# Patient Record
Sex: FEMALE | Race: WHITE | NOT HISPANIC OR LATINO | Employment: FULL TIME | URBAN - METROPOLITAN AREA
[De-identification: names, ages, dates, MRNs, and addresses within clinical notes are randomized per-mention and may not be internally consistent; named-entity substitution may affect disease eponyms.]

---

## 2017-02-15 DIAGNOSIS — K21.9 GASTROESOPHAGEAL REFLUX DISEASE WITHOUT ESOPHAGITIS: ICD-10-CM

## 2017-02-15 RX ORDER — PANTOPRAZOLE SODIUM 20 MG/1
20 TABLET, DELAYED RELEASE ORAL DAILY
Qty: 30 TABLET | Refills: 0 | Status: SHIPPED | OUTPATIENT
Start: 2017-02-15 | End: 2017-03-08 | Stop reason: SDUPTHER

## 2017-03-08 ENCOUNTER — OFFICE VISIT (OUTPATIENT)
Dept: FAMILY MEDICINE CLINIC | Facility: CLINIC | Age: 37
End: 2017-03-08

## 2017-03-08 VITALS
RESPIRATION RATE: 16 BRPM | SYSTOLIC BLOOD PRESSURE: 102 MMHG | HEIGHT: 66 IN | HEART RATE: 84 BPM | BODY MASS INDEX: 28.28 KG/M2 | TEMPERATURE: 98.1 F | DIASTOLIC BLOOD PRESSURE: 66 MMHG | WEIGHT: 176 LBS | OXYGEN SATURATION: 98 %

## 2017-03-08 DIAGNOSIS — F98.8 ADD (ATTENTION DEFICIT DISORDER) WITHOUT HYPERACTIVITY: ICD-10-CM

## 2017-03-08 DIAGNOSIS — K21.9 GASTROESOPHAGEAL REFLUX DISEASE WITHOUT ESOPHAGITIS: ICD-10-CM

## 2017-03-08 DIAGNOSIS — F41.9 ANXIETY: ICD-10-CM

## 2017-03-08 DIAGNOSIS — F51.01 PRIMARY INSOMNIA: Primary | ICD-10-CM

## 2017-03-08 PROCEDURE — 99213 OFFICE O/P EST LOW 20 MIN: CPT | Performed by: FAMILY MEDICINE

## 2017-03-08 RX ORDER — FLUTICASONE PROPIONATE 50 MCG
SPRAY, SUSPENSION (ML) NASAL
Refills: 0 | COMMUNITY
Start: 2017-02-07 | End: 2021-03-29 | Stop reason: SDUPTHER

## 2017-03-08 RX ORDER — PANTOPRAZOLE SODIUM 20 MG/1
20 TABLET, DELAYED RELEASE ORAL DAILY
Qty: 30 TABLET | Refills: 0 | Status: SHIPPED | OUTPATIENT
Start: 2017-03-08 | End: 2017-04-13 | Stop reason: SDUPTHER

## 2017-03-08 RX ORDER — ALPRAZOLAM 2 MG/1
2 TABLET ORAL 2 TIMES DAILY PRN
Qty: 60 TABLET | Refills: 5 | Status: SHIPPED | OUTPATIENT
Start: 2017-03-08 | End: 2017-08-07 | Stop reason: SDUPTHER

## 2017-03-08 RX ORDER — ZOLPIDEM TARTRATE 10 MG/1
10 TABLET ORAL NIGHTLY PRN
Qty: 30 TABLET | Refills: 5 | Status: SHIPPED | OUTPATIENT
Start: 2017-03-08 | End: 2017-08-07 | Stop reason: SDUPTHER

## 2017-03-08 RX ORDER — ETHYNODIOL DIACETATE AND ETHINYL ESTRADIOL 1 MG-35MCG
KIT ORAL
Refills: 11 | COMMUNITY
Start: 2017-02-25 | End: 2020-01-16

## 2017-03-08 NOTE — PATIENT INSTRUCTIONS
This is a very nice 37-year-old who is here for follow-up.  She has always had a focus problem and so I will make a request for consultation for testing for ADD.

## 2017-03-08 NOTE — PROGRESS NOTES
Subjective   Dianna Clay is a 37 y.o. female presenting with   Chief Complaint   Patient presents with   • Medciation Check Up   • Med Refill     all meds (except her birth control pills)  send to [facility]     • Testing     for ADD   • Earache     right ear pain  x 1-2 wks    • Labs Only     drug screen         HPI Comments: 37-year-old  white female nonsmoker comes in today for routine follow-up for insomnia and anxiety.  She tells me she has always had problems with focus and her boss has recently asked her if she's ever been tested for ADD, since her boss's son has it.  We have talked about this in the past a couple years ago and a year before that her therapist suggested she get tested as well.  However she has always had coping mechanisms that allowed her to function without medication.  However now that her boss is bringing is up, she is ready to get tested.    He also mentions that her right earlobe is a little sensitive and she wanted me to look at that.  However she thinks she might be a little too rough when she puts her ear rings in her pierced ear    Earache           The following portions of the patient's history were reviewed and updated as appropriate: current medications, past family history, past medical history, past social history, past surgical history and problem list.    Review of Systems   HENT: Positive for ear pain.    Psychiatric/Behavioral: Positive for decreased concentration and sleep disturbance. The patient is nervous/anxious.    All other systems reviewed and are negative.      Objective   Physical Exam   Constitutional: She is oriented to person, place, and time. She appears well-developed and well-nourished. No distress.   HENT:   Head: Normocephalic and atraumatic.   Right Ear: External ear normal.   Left Ear: External ear normal.   Mouth/Throat: Oropharynx is clear and moist. No oropharyngeal exudate.   Eyes: EOM are normal. Pupils are equal, round, and reactive to  light.   Neck: Normal range of motion. Neck supple. No thyromegaly present.   Cardiovascular: Normal rate and regular rhythm.    Pulmonary/Chest: Effort normal and breath sounds normal.   Musculoskeletal: Normal range of motion. She exhibits no edema or tenderness.   Lymphadenopathy:     She has no cervical adenopathy.   Neurological: She is alert and oriented to person, place, and time. No cranial nerve deficit. Coordination normal.   Skin: Skin is warm. She is not diaphoretic.   Psychiatric: She has a normal mood and affect. Her speech is normal and behavior is normal. Judgment and thought content normal. She is not actively hallucinating. Cognition and memory are normal. She is attentive.   Nursing note and vitals reviewed.      Assessment/Plan   Dianna was seen today for medciation check up, med refill, testing, earache and labs only.    Diagnoses and all orders for this visit:    Primary insomnia  -     zolpidem (AMBIEN) 10 MG tablet; Take 1 tablet by mouth At Night As Needed for sleep.  -     201438 9+Oxycodone+Crt-Unbund    Anxiety  -     ALPRAZolam (XANAX) 2 MG tablet; Take 1 tablet by mouth 2 (Two) Times a Day As Needed for Anxiety.  -     148208 9+Oxycodone+Crt-Unbund    Gastroesophageal reflux disease without esophagitis  -     pantoprazole (PROTONIX) 20 MG EC tablet; Take 1 tablet by mouth Daily.    ADD (attention deficit disorder) without hyperactivity  -     Ambulatory Referral to Psychology    Other orders  -     topiramate (TOPAMAX) 200 MG tablet; Take 1 tablet by mouth Daily.                   I would like him to return for another visit in 6 month(s)

## 2017-03-22 LAB
ALPRAZ UR QL: NEGATIVE
AMPHETAMINES UR QL SCN: NEGATIVE NG/ML
BARBITURATES UR QL SCN: NEGATIVE NG/ML
BENZODIAZ UR QL SCN: NORMAL NG/ML
BENZODIAZ UR QL: NEGATIVE NG/ML
BZE UR QL SCN: NEGATIVE NG/ML
CANNABINOIDS UR QL SCN: NEGATIVE NG/ML
CLONAZEPAM UR QL: NEGATIVE
CREAT UR-MCNC: 33 MG/DL (ref 20–300)
FLURAZEPAM UR QL: NEGATIVE
LORAZEPAM UR QL: NEGATIVE
Lab: NORMAL
METHADONE UR QL SCN: NEGATIVE NG/ML
MIDAZOLAM UR QL CFM: NEGATIVE
NORDIAZEPAM UR QL: NEGATIVE
OPIATES UR QL SCN: NEGATIVE NG/ML
OXAZEPAM UR QL: NEGATIVE
OXYCODONE+OXYMORPHONE UR QL SCN: NEGATIVE NG/ML
PCP UR QL: NEGATIVE NG/ML
PH UR: 6 [PH] (ref 4.5–8.9)
PROPOXYPH UR QL SCN: NEGATIVE NG/ML
TEMAZEPAM UR QL CFM: NEGATIVE
TRIAZOLAM UR QL: NEGATIVE

## 2017-04-13 DIAGNOSIS — K21.9 GASTROESOPHAGEAL REFLUX DISEASE WITHOUT ESOPHAGITIS: ICD-10-CM

## 2017-04-14 RX ORDER — PANTOPRAZOLE SODIUM 20 MG/1
TABLET, DELAYED RELEASE ORAL
Qty: 30 TABLET | Refills: 0 | Status: SHIPPED | OUTPATIENT
Start: 2017-04-14 | End: 2017-06-13 | Stop reason: SDUPTHER

## 2017-06-13 DIAGNOSIS — K21.9 GASTROESOPHAGEAL REFLUX DISEASE WITHOUT ESOPHAGITIS: ICD-10-CM

## 2017-06-14 RX ORDER — PANTOPRAZOLE SODIUM 20 MG/1
TABLET, DELAYED RELEASE ORAL
Qty: 30 TABLET | Refills: 2 | Status: SHIPPED | OUTPATIENT
Start: 2017-06-14 | End: 2017-08-25 | Stop reason: SDUPTHER

## 2017-08-07 DIAGNOSIS — F51.01 PRIMARY INSOMNIA: ICD-10-CM

## 2017-08-07 DIAGNOSIS — F41.9 ANXIETY: ICD-10-CM

## 2017-08-07 RX ORDER — ALPRAZOLAM 2 MG/1
TABLET ORAL
Qty: 60 TABLET | Refills: 0 | OUTPATIENT
Start: 2017-08-07 | End: 2017-10-25 | Stop reason: SDUPTHER

## 2017-08-07 RX ORDER — ZOLPIDEM TARTRATE 10 MG/1
TABLET ORAL
Qty: 30 TABLET | Refills: 0 | OUTPATIENT
Start: 2017-08-07 | End: 2017-10-25 | Stop reason: SDUPTHER

## 2017-08-25 DIAGNOSIS — K21.9 GASTROESOPHAGEAL REFLUX DISEASE WITHOUT ESOPHAGITIS: ICD-10-CM

## 2017-08-25 RX ORDER — PANTOPRAZOLE SODIUM 20 MG/1
TABLET, DELAYED RELEASE ORAL
Qty: 30 TABLET | Refills: 1 | Status: SHIPPED | OUTPATIENT
Start: 2017-08-25 | End: 2017-11-27 | Stop reason: SDUPTHER

## 2017-10-25 ENCOUNTER — TELEPHONE (OUTPATIENT)
Dept: FAMILY MEDICINE CLINIC | Facility: CLINIC | Age: 37
End: 2017-10-25

## 2017-10-25 ENCOUNTER — OFFICE VISIT (OUTPATIENT)
Dept: FAMILY MEDICINE CLINIC | Facility: CLINIC | Age: 37
End: 2017-10-25

## 2017-10-25 VITALS
SYSTOLIC BLOOD PRESSURE: 100 MMHG | OXYGEN SATURATION: 95 % | BODY MASS INDEX: 27.32 KG/M2 | WEIGHT: 170 LBS | TEMPERATURE: 98.3 F | HEIGHT: 66 IN | HEART RATE: 90 BPM | DIASTOLIC BLOOD PRESSURE: 70 MMHG

## 2017-10-25 DIAGNOSIS — F41.9 ANXIETY: ICD-10-CM

## 2017-10-25 DIAGNOSIS — G43.009 MIGRAINE WITHOUT AURA AND WITHOUT STATUS MIGRAINOSUS, NOT INTRACTABLE: ICD-10-CM

## 2017-10-25 DIAGNOSIS — F41.8 MIXED ANXIETY DEPRESSIVE DISORDER: Primary | ICD-10-CM

## 2017-10-25 DIAGNOSIS — F51.01 PRIMARY INSOMNIA: ICD-10-CM

## 2017-10-25 PROCEDURE — 99213 OFFICE O/P EST LOW 20 MIN: CPT | Performed by: FAMILY MEDICINE

## 2017-10-25 RX ORDER — BUPROPION HYDROCHLORIDE 150 MG/1
150 TABLET ORAL DAILY
Qty: 30 TABLET | Refills: 5 | Status: SHIPPED | OUTPATIENT
Start: 2017-10-25 | End: 2018-03-28 | Stop reason: SDUPTHER

## 2017-10-25 RX ORDER — ZOLPIDEM TARTRATE 10 MG/1
10 TABLET ORAL NIGHTLY PRN
Qty: 30 TABLET | Refills: 5 | OUTPATIENT
Start: 2017-10-25 | End: 2018-03-28 | Stop reason: SDUPTHER

## 2017-10-25 RX ORDER — ALPRAZOLAM 2 MG/1
2 TABLET ORAL 2 TIMES DAILY PRN
Qty: 60 TABLET | Refills: 5 | OUTPATIENT
Start: 2017-10-25 | End: 2018-03-28 | Stop reason: SDUPTHER

## 2017-10-25 NOTE — PATIENT INSTRUCTIONS
This is a very nice 37-year-old who is here for follow-up for migraine headaches and anxiety.  I will will try Wellbutrin and see if that helps.  I would like her to call if there is any problem.

## 2017-10-25 NOTE — PROGRESS NOTES
Subjective   Dianna Clay is a 37 y.o. female presenting with   Chief Complaint   Patient presents with   • Anxiety        HPI Comments: This is a 37-year-old  white female nonsmoker who is  to a 45-year-old gentleman and has a 15-year-old and 11-year-old and 10-year-old at home.  She just started a job 2 months ago which she says she really likes but is very high stress.  She says she is seeing a counselor at least once monthly who suggested to her that she has anxiety and OCD and really does need to be on some kind of medication.  She tells me since she was 17 years old she has always been on something and had good results with Wellbutrin and fair results with Prozac but did feel it did inhibit her sexually.  She does not want to be on anything that is addictive.    She also says that she is on 200 mg Topamax daily but occasionally feels like she is about to have another migraine.  She asks if we should up the dose of her Topamax, but I told her that 200 mg was about the highest I was comfortable having her own without consultation with a neurologist.  She said she would just continue the 200 and see if reducing her anxiety level with the Wellbutrin would help.    Anxiety   Symptoms include nervous/anxious behavior.            The following portions of the patient's history were reviewed and updated as appropriate: current medications, past family history, past medical history, past social history, past surgical history and problem list.    Review of Systems   Neurological: Positive for headaches.   Psychiatric/Behavioral: The patient is nervous/anxious.    All other systems reviewed and are negative.      Objective   Physical Exam   Constitutional: She is oriented to person, place, and time. She appears well-developed and well-nourished. No distress.   HENT:   Head: Normocephalic and atraumatic.   Eyes: EOM are normal. Pupils are equal, round, and reactive to light.   Neck: Normal range of  motion. Neck supple. No thyromegaly present.   Cardiovascular: Normal rate, regular rhythm, normal heart sounds and intact distal pulses.  Exam reveals no friction rub.    No murmur heard.  Pulmonary/Chest: Effort normal and breath sounds normal.   Musculoskeletal: Normal range of motion. She exhibits no edema.   Lymphadenopathy:     She has no cervical adenopathy.   Neurological: She is alert and oriented to person, place, and time.   Skin: Skin is warm and dry. She is not diaphoretic.   Psychiatric: She has a normal mood and affect. Her speech is normal and behavior is normal. Judgment and thought content normal. She is not actively hallucinating. Cognition and memory are normal.   Well-dressed and good eye contact.  Linear thought 10 normal recent and remote memory.  No sign of any suicidal or homicidal ideation or of any delusions. She is attentive.   Nursing note and vitals reviewed.      Assessment/Plan   Dianna was seen today for anxiety.    Diagnoses and all orders for this visit:    Mixed anxiety depressive disorder    Anxiety    Migraine without aura and without status migrainosus, not intractable    Other orders  -     buPROPion XL (WELLBUTRIN XL) 150 MG 24 hr tablet; Take 1 tablet by mouth Daily.                   I would like him to return for another visit in 6 month(s)

## 2017-11-27 DIAGNOSIS — K21.9 GASTROESOPHAGEAL REFLUX DISEASE WITHOUT ESOPHAGITIS: ICD-10-CM

## 2017-11-27 RX ORDER — PANTOPRAZOLE SODIUM 20 MG/1
TABLET, DELAYED RELEASE ORAL
Qty: 30 TABLET | Refills: 2 | Status: SHIPPED | OUTPATIENT
Start: 2017-11-27 | End: 2018-02-28 | Stop reason: SDUPTHER

## 2018-02-28 DIAGNOSIS — K21.9 GASTROESOPHAGEAL REFLUX DISEASE WITHOUT ESOPHAGITIS: ICD-10-CM

## 2018-02-28 RX ORDER — PANTOPRAZOLE SODIUM 20 MG/1
TABLET, DELAYED RELEASE ORAL
Qty: 30 TABLET | Refills: 2 | Status: SHIPPED | OUTPATIENT
Start: 2018-02-28 | End: 2018-06-18 | Stop reason: SDUPTHER

## 2018-03-06 ENCOUNTER — OFFICE VISIT (OUTPATIENT)
Dept: FAMILY MEDICINE CLINIC | Facility: CLINIC | Age: 38
End: 2018-03-06

## 2018-03-06 VITALS
BODY MASS INDEX: 26.97 KG/M2 | HEART RATE: 78 BPM | OXYGEN SATURATION: 98 % | DIASTOLIC BLOOD PRESSURE: 60 MMHG | SYSTOLIC BLOOD PRESSURE: 105 MMHG | HEIGHT: 66 IN | TEMPERATURE: 98.4 F | WEIGHT: 167.8 LBS

## 2018-03-06 DIAGNOSIS — J01.00 ACUTE NON-RECURRENT MAXILLARY SINUSITIS: Primary | ICD-10-CM

## 2018-03-06 DIAGNOSIS — J02.9 SORE THROAT: ICD-10-CM

## 2018-03-06 LAB
EXPIRATION DATE: NORMAL
INTERNAL CONTROL: NORMAL
Lab: NORMAL
S PYO AG THROAT QL: NEGATIVE

## 2018-03-06 PROCEDURE — 87880 STREP A ASSAY W/OPTIC: CPT | Performed by: NURSE PRACTITIONER

## 2018-03-06 PROCEDURE — 99213 OFFICE O/P EST LOW 20 MIN: CPT | Performed by: NURSE PRACTITIONER

## 2018-03-06 RX ORDER — AMOXICILLIN AND CLAVULANATE POTASSIUM 875; 125 MG/1; MG/1
1 TABLET, FILM COATED ORAL EVERY 12 HOURS SCHEDULED
Qty: 20 TABLET | Refills: 0 | Status: SHIPPED | OUTPATIENT
Start: 2018-03-06 | End: 2018-04-30

## 2018-03-06 RX ORDER — FLUCONAZOLE 150 MG/1
TABLET ORAL
Qty: 3 TABLET | Refills: 0 | Status: SHIPPED | OUTPATIENT
Start: 2018-03-06 | End: 2019-05-24

## 2018-03-06 NOTE — PROGRESS NOTES
Subjective   Dianna Clay is a 38 y.o. female increased ear popping, swollen lymph nodes, sore throat, painful to swallow, increased post nasal drainage. Denies sinus pain or pressure. Possible fever 2 nights ago.    Sore Throat    This is a new problem. The current episode started in the past 7 days. The problem has been unchanged. Maximum temperature: low grade. The pain is moderate. Associated symptoms include congestion, coughing (mild, dry ), a plugged ear sensation and swollen glands. Pertinent negatives include no abdominal pain, diarrhea, drooling, ear discharge, ear pain (ear popping), headaches, hoarse voice, neck pain, shortness of breath, stridor, trouble swallowing or vomiting. She has had no exposure to strep or mono. She has tried NSAIDs for the symptoms. The treatment provided mild relief.   URI    This is a new problem. The current episode started in the past 7 days. The problem has been unchanged. There has been no fever. Associated symptoms include congestion, coughing (mild, dry ), a plugged ear sensation, rhinorrhea, a sore throat and swollen glands. Pertinent negatives include no abdominal pain, chest pain, diarrhea, dysuria, ear pain (ear popping), headaches, joint pain, joint swelling, nausea, neck pain, rash, sinus pain, sneezing, vomiting or wheezing. She has tried NSAIDs for the symptoms. The treatment provided no relief.        The following portions of the patient's history were reviewed and updated as appropriate: allergies, current medications, past family history, past medical history, past social history, past surgical history and problem list.    Review of Systems   Constitutional: Positive for fatigue.   HENT: Positive for congestion, postnasal drip, rhinorrhea and sore throat. Negative for drooling, ear discharge, ear pain (ear popping), hoarse voice, sinus pain, sinus pressure, sneezing and trouble swallowing.    Respiratory: Positive for cough (mild, dry ). Negative for  shortness of breath, wheezing and stridor.    Cardiovascular: Negative for chest pain.   Gastrointestinal: Negative for abdominal pain, diarrhea, nausea and vomiting.   Genitourinary: Negative for dysuria.   Musculoskeletal: Negative for joint pain and neck pain.   Skin: Negative for rash.   Neurological: Negative for headaches.       Objective   Physical Exam   Constitutional: She is oriented to person, place, and time. She appears well-developed and well-nourished.   HENT:   Head: Normocephalic and atraumatic.   Right Ear: External ear and ear canal normal. A middle ear effusion is present.   Left Ear: External ear and ear canal normal. A middle ear effusion is present.   Nose: Mucosal edema present. No rhinorrhea. Right sinus exhibits maxillary sinus tenderness. Right sinus exhibits no frontal sinus tenderness. Left sinus exhibits maxillary sinus tenderness. Left sinus exhibits no frontal sinus tenderness.   Mouth/Throat: Uvula is midline, oropharynx is clear and moist and mucous membranes are normal. No tonsillar exudate.   Eyes: Pupils are equal, round, and reactive to light.   Neck: Neck supple.   Cardiovascular: Normal rate, regular rhythm and normal heart sounds.  Exam reveals no gallop and no friction rub.    No murmur heard.  Pulmonary/Chest: Effort normal and breath sounds normal. No respiratory distress. She has no wheezes. She has no rales.   Lymphadenopathy:     She has cervical adenopathy.   Neurological: She is alert and oriented to person, place, and time.   Skin: Skin is warm and dry.   Psychiatric: She has a normal mood and affect.   Vitals reviewed.      Assessment/Plan   Dianna was seen today for sore throat.    Diagnoses and all orders for this visit:    Sore throat  -     POCT rapid strep A    Other orders  -     amoxicillin-clavulanate (AUGMENTIN) 875-125 MG per tablet; Take 1 tablet by mouth Every 12 (Twelve) Hours.  -     fluconazole (DIFLUCAN) 150 MG tablet; Take 1 at onset of symptoms,  repeat in 3 days x 2 as needed

## 2018-03-28 DIAGNOSIS — F51.01 PRIMARY INSOMNIA: ICD-10-CM

## 2018-03-28 DIAGNOSIS — F41.9 ANXIETY: ICD-10-CM

## 2018-03-28 RX ORDER — ALPRAZOLAM 2 MG/1
TABLET ORAL
Qty: 60 TABLET | Refills: 1 | OUTPATIENT
Start: 2018-03-28 | End: 2018-04-30 | Stop reason: SINTOL

## 2018-03-28 RX ORDER — ZOLPIDEM TARTRATE 10 MG/1
TABLET ORAL
Qty: 30 TABLET | Refills: 1 | OUTPATIENT
Start: 2018-03-28 | End: 2018-07-19 | Stop reason: SDUPTHER

## 2018-03-28 RX ORDER — BUPROPION HYDROCHLORIDE 150 MG/1
TABLET ORAL
Qty: 30 TABLET | Refills: 1 | Status: SHIPPED | OUTPATIENT
Start: 2018-03-28 | End: 2018-06-18 | Stop reason: SDUPTHER

## 2018-04-27 ENCOUNTER — TELEPHONE (OUTPATIENT)
Dept: FAMILY MEDICINE CLINIC | Facility: CLINIC | Age: 38
End: 2018-04-27

## 2018-04-27 ENCOUNTER — CLINICAL SUPPORT (OUTPATIENT)
Dept: FAMILY MEDICINE CLINIC | Facility: CLINIC | Age: 38
End: 2018-04-27

## 2018-04-27 DIAGNOSIS — Z23 IMMUNIZATION DUE: Primary | ICD-10-CM

## 2018-04-27 PROCEDURE — 90632 HEPA VACCINE ADULT IM: CPT | Performed by: FAMILY MEDICINE

## 2018-04-27 PROCEDURE — 90471 IMMUNIZATION ADMIN: CPT | Performed by: FAMILY MEDICINE

## 2018-04-27 NOTE — TELEPHONE ENCOUNTER
Pt had questions about her welbutrin and it's side effects, was wondering if she should still feel really emotional after being on it for 6 mo.    Pt needs refills on xanax and ambien      Pt did not cancel appt someone up front did. She came in at 4pm and you had just left.

## 2018-04-30 DIAGNOSIS — F41.8 ANXIETY ASSOCIATED WITH DEPRESSION: Primary | ICD-10-CM

## 2018-04-30 RX ORDER — HYDROXYZINE HYDROCHLORIDE 25 MG/1
25 TABLET, FILM COATED ORAL 3 TIMES DAILY PRN
Qty: 30 TABLET | Refills: 2 | Status: SHIPPED | OUTPATIENT
Start: 2018-04-30 | End: 2018-11-29

## 2018-04-30 NOTE — TELEPHONE ENCOUNTER
Called pt and told her about her choices and she said she would give hydroxyzine a try and if you could call in a script

## 2018-04-30 NOTE — TELEPHONE ENCOUNTER
Called pt back and she was wondering if you were taking her off of xanax all together and not weaning her off with a smaller dose? Also she was asking if she would be hearing from a psychiatric office to set up an appt?

## 2018-04-30 NOTE — TELEPHONE ENCOUNTER
Our group of doctors has determined that xanax is a high risk medication for long term use.  Since she continues to have a lot of symptoms despite the welbutrin, I will request a psychiatry consult to see what is the best next step.

## 2018-04-30 NOTE — TELEPHONE ENCOUNTER
She should be hearing from [facility] about the referral, and she is on a low enough dose that she shouldn't have to be weaned.

## 2018-06-18 DIAGNOSIS — K21.9 GASTROESOPHAGEAL REFLUX DISEASE WITHOUT ESOPHAGITIS: ICD-10-CM

## 2018-06-18 DIAGNOSIS — F51.01 PRIMARY INSOMNIA: ICD-10-CM

## 2018-06-18 RX ORDER — BUPROPION HYDROCHLORIDE 150 MG/1
TABLET ORAL
Qty: 30 TABLET | Status: CANCELLED | OUTPATIENT
Start: 2018-06-18

## 2018-06-18 RX ORDER — PANTOPRAZOLE SODIUM 20 MG/1
20 TABLET, DELAYED RELEASE ORAL DAILY
Qty: 30 TABLET | Refills: 0 | Status: SHIPPED | OUTPATIENT
Start: 2018-06-18 | End: 2018-12-18 | Stop reason: SDUPTHER

## 2018-06-18 RX ORDER — BUPROPION HYDROCHLORIDE 150 MG/1
150 TABLET ORAL DAILY
Qty: 30 TABLET | Refills: 0 | Status: SHIPPED | OUTPATIENT
Start: 2018-06-18 | End: 2018-07-20 | Stop reason: SDUPTHER

## 2018-06-18 RX ORDER — ALPRAZOLAM 2 MG/1
TABLET ORAL
Qty: 60 TABLET | OUTPATIENT
Start: 2018-06-18

## 2018-06-18 RX ORDER — ZOLPIDEM TARTRATE 10 MG/1
TABLET ORAL
Qty: 30 TABLET | OUTPATIENT
Start: 2018-06-18

## 2018-06-18 RX ORDER — PANTOPRAZOLE SODIUM 20 MG/1
TABLET, DELAYED RELEASE ORAL
Qty: 30 TABLET | Status: CANCELLED | OUTPATIENT
Start: 2018-06-18

## 2018-06-18 NOTE — TELEPHONE ENCOUNTER
Pt said we canceled her appt on her 4/27/18 and she has been busy and can't get in to see the doctor and wants a refill in these meds. Also said she is leaving today at 3pm.

## 2018-07-19 DIAGNOSIS — F51.01 PRIMARY INSOMNIA: ICD-10-CM

## 2018-07-20 RX ORDER — ALPRAZOLAM 2 MG/1
TABLET ORAL
Qty: 60 TABLET | Refills: 0 | OUTPATIENT
Start: 2018-07-20 | End: 2018-09-08 | Stop reason: SDUPTHER

## 2018-07-20 RX ORDER — BUPROPION HYDROCHLORIDE 150 MG/1
TABLET ORAL
Qty: 30 TABLET | Refills: 2 | Status: SHIPPED | OUTPATIENT
Start: 2018-07-20 | End: 2018-11-29 | Stop reason: SDUPTHER

## 2018-07-20 RX ORDER — ZOLPIDEM TARTRATE 10 MG/1
TABLET ORAL
Qty: 30 TABLET | Refills: 0 | OUTPATIENT
Start: 2018-07-20 | End: 2018-09-08 | Stop reason: SDUPTHER

## 2018-09-08 DIAGNOSIS — F51.01 PRIMARY INSOMNIA: ICD-10-CM

## 2018-09-10 RX ORDER — ZOLPIDEM TARTRATE 10 MG/1
TABLET ORAL
Qty: 30 TABLET | Refills: 0 | OUTPATIENT
Start: 2018-09-10 | End: 2018-11-29 | Stop reason: SDUPTHER

## 2018-09-10 RX ORDER — ALPRAZOLAM 2 MG/1
TABLET ORAL
Qty: 60 TABLET | Refills: 0 | OUTPATIENT
Start: 2018-09-10 | End: 2018-11-29 | Stop reason: SDUPTHER

## 2018-11-29 ENCOUNTER — OFFICE VISIT (OUTPATIENT)
Dept: FAMILY MEDICINE CLINIC | Facility: CLINIC | Age: 38
End: 2018-11-29

## 2018-11-29 VITALS
TEMPERATURE: 98.1 F | DIASTOLIC BLOOD PRESSURE: 70 MMHG | HEART RATE: 96 BPM | WEIGHT: 183 LBS | OXYGEN SATURATION: 99 % | SYSTOLIC BLOOD PRESSURE: 98 MMHG | BODY MASS INDEX: 29.55 KG/M2

## 2018-11-29 DIAGNOSIS — F51.01 PRIMARY INSOMNIA: Primary | ICD-10-CM

## 2018-11-29 DIAGNOSIS — Z13.29 SCREENING FOR THYROID DISORDER: ICD-10-CM

## 2018-11-29 DIAGNOSIS — F33.1 MODERATE EPISODE OF RECURRENT MAJOR DEPRESSIVE DISORDER (HCC): ICD-10-CM

## 2018-11-29 DIAGNOSIS — R53.83 FATIGUE, UNSPECIFIED TYPE: ICD-10-CM

## 2018-11-29 PROCEDURE — 99214 OFFICE O/P EST MOD 30 MIN: CPT | Performed by: NURSE PRACTITIONER

## 2018-11-29 RX ORDER — ALPRAZOLAM 2 MG/1
2 TABLET ORAL 2 TIMES DAILY PRN
Qty: 60 TABLET | Refills: 0 | Status: SHIPPED | OUTPATIENT
Start: 2018-11-29 | End: 2019-01-28 | Stop reason: SDUPTHER

## 2018-11-29 RX ORDER — BUPROPION HYDROCHLORIDE 150 MG/1
150 TABLET ORAL DAILY
Qty: 30 TABLET | Refills: 5 | Status: SHIPPED | OUTPATIENT
Start: 2018-11-29 | End: 2019-05-24 | Stop reason: SDUPTHER

## 2018-11-29 RX ORDER — ALPRAZOLAM 2 MG/1
2 TABLET ORAL 2 TIMES DAILY PRN
Qty: 60 TABLET | Refills: 0 | OUTPATIENT
Start: 2018-11-29 | End: 2018-11-29 | Stop reason: SDUPTHER

## 2018-11-29 RX ORDER — ZOLPIDEM TARTRATE 10 MG/1
10 TABLET ORAL NIGHTLY PRN
Qty: 30 TABLET | Refills: 0 | OUTPATIENT
Start: 2018-11-29 | End: 2018-11-29 | Stop reason: SDUPTHER

## 2018-11-29 RX ORDER — ZOLPIDEM TARTRATE 10 MG/1
10 TABLET ORAL NIGHTLY PRN
Qty: 30 TABLET | Refills: 0 | Status: SHIPPED | OUTPATIENT
Start: 2018-11-29 | End: 2019-01-28 | Stop reason: SDUPTHER

## 2018-11-29 NOTE — PROGRESS NOTES
Subjective   Dianna Clay is a 38 y.o. female presents with increased fatigue and insomnia, feeling depressed, not wanting to leave her house. States has not left her house for two weeks and she has been working from home. She usually works out 4-5 x weekly and she has not worked out. She is falling asleep at her computer. Typically she will sleep 7-8 hours but her sleep has been interrupted and she has been out of ambien. She has  recently and bought a new house and sharing custody with her ex-. She has a history of depression and feels that she is going back into that cycle. She takes wellbutrin but doesn't remember to take it everyday. She also reports increased alcohol intake, 1-2 glasses of wine here and there which is unusual for her.     Fatigue   Associated symptoms include fatigue. Pertinent negatives include no abdominal pain, anorexia, arthralgias, change in bowel habit, chest pain, chills, congestion, coughing, diaphoresis, fever, headaches, joint swelling, myalgias, nausea, neck pain, numbness, rash, sore throat, swollen glands, urinary symptoms, vertigo, visual change, vomiting or weakness. Nothing aggravates the symptoms. She has tried nothing for the symptoms. The treatment provided no relief.   Depression   Visit Type: follow-up  Patient presents with the following symptoms: depressed mood, excessive worry, fatigue, feelings of hopelessness, insomnia and malaise.  Patient is not experiencing: suicidal ideas, suicidal planning, thoughts of death, weight gain and weight loss.  Frequency of symptoms: most days   Severity: interfering with daily activities   Sleep quality: fair  Nighttime awakenings: occasional  Compliance with medications:  51-75%             The following portions of the patient's history were reviewed and updated as appropriate: allergies, current medications, past family history, past medical history, past social history, past surgical history and problem  list.    Review of Systems   Constitutional: Positive for fatigue. Negative for chills, diaphoresis, fever, weight gain and weight loss.   HENT: Negative for congestion and sore throat.    Respiratory: Negative for cough.    Cardiovascular: Negative for chest pain.   Gastrointestinal: Negative for abdominal pain, anorexia, change in bowel habit, nausea and vomiting.   Musculoskeletal: Negative for arthralgias, joint swelling, myalgias and neck pain.   Skin: Negative for rash.   Neurological: Negative for vertigo, weakness, numbness and headaches.   Psychiatric/Behavioral: Negative for suicidal ideas. The patient has insomnia.        Objective   Physical Exam   Constitutional: She is oriented to person, place, and time. She appears well-developed and well-nourished.   Eyes: Conjunctivae are normal. Pupils are equal, round, and reactive to light.   Neck: Normal range of motion. Neck supple.   Cardiovascular: Normal rate, regular rhythm and normal heart sounds. Exam reveals no gallop and no friction rub.   No murmur heard.  Pulmonary/Chest: Effort normal and breath sounds normal. No stridor. No respiratory distress. She has no wheezes.   Abdominal: Soft. Bowel sounds are normal. She exhibits no distension. There is no tenderness.   Neurological: She is alert and oriented to person, place, and time.   Skin: Skin is warm and dry.   Psychiatric: She has a normal mood and affect.   Vitals reviewed.      Assessment/Plan   Dianna was seen today for follow-up, depression and med refill.    Diagnoses and all orders for this visit:    Primary insomnia  -     Discontinue: zolpidem (AMBIEN) 10 MG tablet; Take 1 tablet by mouth At Night As Needed for Sleep. for sleep  -     zolpidem (AMBIEN) 10 MG tablet; Take 1 tablet by mouth At Night As Needed for Sleep. for sleep    Screening for thyroid disorder  -     TSH Rfx On Abnormal To Free T4    Fatigue, unspecified type  -     Comprehensive metabolic panel  -     Vitamin D 25  hydroxy    Moderate episode of recurrent major depressive disorder (CMS/HCC)    Other orders  -     buPROPion XL (WELLBUTRIN XL) 150 MG 24 hr tablet; Take 1 tablet by mouth Daily.  -     Discontinue: ALPRAZolam (XANAX) 2 MG tablet; Take 1 tablet by mouth 2 (Two) Times a Day As Needed for Anxiety. for anxiety  -     ALPRAZolam (XANAX) 2 MG tablet; Take 1 tablet by mouth 2 (Two) Times a Day As Needed for Anxiety. for anxiety  -     topiramate (TOPAMAX) 200 MG tablet; Take 1 tablet by mouth Daily.      Will refill xanax and ambien for Dr. Monet until he returns  Discussed taking wellbutrin on daily basis  Continue topamax  Resume physical activity  Believe symptoms are likely situational with multiple life events recently and insomnia  Check labs to rule out underlying concern  Follow up in 3 months

## 2018-11-30 LAB
25(OH)D3+25(OH)D2 SERPL-MCNC: 66 NG/ML (ref 30–100)
ALBUMIN SERPL-MCNC: 4.6 G/DL (ref 3.5–5.2)
ALBUMIN/GLOB SERPL: 1.8 G/DL
ALP SERPL-CCNC: 45 U/L (ref 39–117)
ALT SERPL-CCNC: 22 U/L (ref 1–33)
AST SERPL-CCNC: 19 U/L (ref 1–32)
BILIRUB SERPL-MCNC: 0.4 MG/DL (ref 0.1–1.2)
BUN SERPL-MCNC: 16 MG/DL (ref 6–20)
BUN/CREAT SERPL: 19.5 (ref 7–25)
CALCIUM SERPL-MCNC: 9.2 MG/DL (ref 8.6–10.5)
CHLORIDE SERPL-SCNC: 99 MMOL/L (ref 98–107)
CO2 SERPL-SCNC: 25.4 MMOL/L (ref 22–29)
CREAT SERPL-MCNC: 0.82 MG/DL (ref 0.57–1)
GLOBULIN SER CALC-MCNC: 2.5 GM/DL
GLUCOSE SERPL-MCNC: 85 MG/DL (ref 65–99)
POTASSIUM SERPL-SCNC: 4.5 MMOL/L (ref 3.5–5.2)
PROT SERPL-MCNC: 7.1 G/DL (ref 6–8.5)
SODIUM SERPL-SCNC: 136 MMOL/L (ref 136–145)
TSH SERPL DL<=0.005 MIU/L-ACNC: 1.3 MIU/ML (ref 0.27–4.2)

## 2018-12-18 DIAGNOSIS — K21.9 GASTROESOPHAGEAL REFLUX DISEASE WITHOUT ESOPHAGITIS: ICD-10-CM

## 2018-12-18 RX ORDER — PANTOPRAZOLE SODIUM 20 MG/1
20 TABLET, DELAYED RELEASE ORAL DAILY
Qty: 30 TABLET | Refills: 4 | Status: SHIPPED | OUTPATIENT
Start: 2018-12-18 | End: 2019-05-01 | Stop reason: SDUPTHER

## 2019-01-09 ENCOUNTER — TELEPHONE (OUTPATIENT)
Dept: FAMILY MEDICINE CLINIC | Facility: CLINIC | Age: 39
End: 2019-01-09

## 2019-01-09 NOTE — TELEPHONE ENCOUNTER
This patient seen an [facility] doctor on Monday this week and was diagnosed with strep and given antibiotics. Her work will not accept the doctors note from the [facility] doctor since it isn't her PCP. She is calling to see if we would be willing to write a note for her for work or if she would need to come in and have another appointment with someone in our office. She is still off work as off today, Wednesday Jan. 9th.

## 2019-01-10 ENCOUNTER — OFFICE VISIT (OUTPATIENT)
Dept: FAMILY MEDICINE CLINIC | Facility: CLINIC | Age: 39
End: 2019-01-10

## 2019-01-10 VITALS
TEMPERATURE: 99 F | HEART RATE: 84 BPM | BODY MASS INDEX: 27.8 KG/M2 | SYSTOLIC BLOOD PRESSURE: 110 MMHG | HEIGHT: 66 IN | OXYGEN SATURATION: 97 % | DIASTOLIC BLOOD PRESSURE: 70 MMHG | WEIGHT: 173 LBS

## 2019-01-10 DIAGNOSIS — J02.9 SORE THROAT: ICD-10-CM

## 2019-01-10 DIAGNOSIS — J11.1 FLU SYNDROME: Primary | ICD-10-CM

## 2019-01-10 DIAGNOSIS — R50.9 FEVER, UNSPECIFIED FEVER CAUSE: ICD-10-CM

## 2019-01-10 LAB
EXPIRATION DATE: NORMAL
EXPIRATION DATE: NORMAL
FLUAV AG NPH QL: NEGATIVE
FLUBV AG NPH QL: NEGATIVE
INTERNAL CONTROL: NORMAL
INTERNAL CONTROL: NORMAL
Lab: NORMAL
Lab: NORMAL
S PYO AG THROAT QL: NEGATIVE

## 2019-01-10 PROCEDURE — 99214 OFFICE O/P EST MOD 30 MIN: CPT | Performed by: NURSE PRACTITIONER

## 2019-01-10 PROCEDURE — 87804 INFLUENZA ASSAY W/OPTIC: CPT | Performed by: NURSE PRACTITIONER

## 2019-01-10 PROCEDURE — 87880 STREP A ASSAY W/OPTIC: CPT | Performed by: NURSE PRACTITIONER

## 2019-01-10 RX ORDER — AMOXICILLIN AND CLAVULANATE POTASSIUM 875; 125 MG/1; MG/1
1 TABLET, FILM COATED ORAL 2 TIMES DAILY WITH MEALS
Refills: 0 | COMMUNITY
Start: 2019-01-08 | End: 2019-05-24

## 2019-01-10 RX ORDER — ONDANSETRON 4 MG/1
4 TABLET, FILM COATED ORAL EVERY 6 HOURS PRN
Qty: 15 TABLET | Refills: 0 | Status: SHIPPED | OUTPATIENT
Start: 2019-01-10 | End: 2022-08-17 | Stop reason: SDUPTHER

## 2019-01-10 NOTE — PATIENT INSTRUCTIONS
Push fluids  Frequent sips electrolytes  600 or 800 mg ibuprofen 3 times a day as needed  Generic Zofran as needed  If chest pain shortness of breath high fever persistent vomiting lethargy  Urgent care ER

## 2019-01-14 NOTE — PROGRESS NOTES
Subjective   Dianna Clay is a 39 y.o. female.     Fever bodyaches  Myalgias  nvd  Worse lastt night  Headaches  Postnasal drip  Mild cough  Started several days ago approximate 3 or 4 symptoms greater than 48 hours  No chest pain shortness of breath  Cough nonproductive  Mostly upper respiratory symptoms and body aches        Sore Throat    Associated symptoms include coughing and headaches. Pertinent negatives include no shortness of breath.   Headache    Associated symptoms include coughing and a sore throat.        The following portions of the patient's history were reviewed and updated as appropriate: allergies, current medications, past family history, past medical history, past social history, past surgical history and problem list.    Review of Systems   Constitutional: Positive for chills. Negative for diaphoresis and fatigue.   HENT: Positive for sore throat.    Respiratory: Positive for cough. Negative for shortness of breath.    Neurological: Positive for headache.        No severe headache   All other systems reviewed and are negative.      Objective   Physical Exam   Constitutional: She is oriented to person, place, and time. She appears well-developed and well-nourished.   Nontoxic   HENT:   Head: Normocephalic.   Mouth/Throat: Oropharynx is clear and moist.   Eyes: Conjunctivae are normal. Pupils are equal, round, and reactive to light.   Neck: Normal range of motion. Neck supple.   Cardiovascular: Normal rate, regular rhythm and normal heart sounds. Exam reveals no friction rub.   No murmur heard.  Pulmonary/Chest: Effort normal and breath sounds normal. No respiratory distress. She has no wheezes.   Abdominal: Soft. She exhibits no distension. There is no tenderness.   Musculoskeletal: She exhibits no edema.   Neck supple no rigidity   Lymphadenopathy:     She has no cervical adenopathy.   Neurological: She is alert and oriented to person, place, and time.   Skin: Skin is warm and dry.    Psychiatric: She has a normal mood and affect. Her behavior is normal. Judgment and thought content normal.   Vitals reviewed.        Assessment/Plan   Dianna was seen today for sore throat and headache.    Diagnoses and all orders for this visit:    Flu syndrome    Sore throat  -     POCT rapid strep A    Fever, unspecified fever cause  -     POCT Influenza A/B    Other orders  -     ondansetron (ZOFRAN) 4 MG tablet; Take 1 tablet by mouth Every 6 (Six) Hours As Needed for Nausea or Vomiting.                Strep and flu test negative  Symptoms are suggestive of flu or flulike syndrome  Greater than 48 hour benefit  Without pneumonia symptoms  Symptomatic treatment  Push fluids  Ibuprofen 800  3 times a day for body aches or headache    Push fluids  Frequent sips electrolytes  600 or 800 mg ibuprofen 3 times a day as needed  Generic Zofran as needed  If chest pain shortness of breath high fever persistent vomiting lethargy  Urgent care ER

## 2019-01-28 DIAGNOSIS — F51.01 PRIMARY INSOMNIA: ICD-10-CM

## 2019-01-29 RX ORDER — ALPRAZOLAM 2 MG/1
TABLET ORAL
Qty: 60 TABLET | Refills: 2 | OUTPATIENT
Start: 2019-01-29 | End: 2019-05-24

## 2019-01-29 RX ORDER — ZOLPIDEM TARTRATE 10 MG/1
TABLET ORAL
Qty: 30 TABLET | Refills: 2 | OUTPATIENT
Start: 2019-01-29 | End: 2019-06-17 | Stop reason: SDUPTHER

## 2019-01-30 DIAGNOSIS — F51.01 PRIMARY INSOMNIA: ICD-10-CM

## 2019-01-31 RX ORDER — ZOLPIDEM TARTRATE 10 MG/1
TABLET ORAL
Qty: 30 TABLET | Refills: 1 | OUTPATIENT
Start: 2019-01-31 | End: 2019-05-24 | Stop reason: SDUPTHER

## 2019-03-21 RX ORDER — ZOLPIDEM TARTRATE 10 MG/1
TABLET ORAL
Qty: 30 TABLET | Refills: 2 | OUTPATIENT
Start: 2019-03-21 | End: 2019-05-24 | Stop reason: SDUPTHER

## 2019-04-30 RX ORDER — ZOLPIDEM TARTRATE 10 MG/1
TABLET ORAL
Qty: 30 TABLET | OUTPATIENT
Start: 2019-04-30

## 2019-05-01 DIAGNOSIS — K21.9 GASTROESOPHAGEAL REFLUX DISEASE WITHOUT ESOPHAGITIS: ICD-10-CM

## 2019-05-02 DIAGNOSIS — K21.9 GASTROESOPHAGEAL REFLUX DISEASE WITHOUT ESOPHAGITIS: ICD-10-CM

## 2019-05-02 RX ORDER — PANTOPRAZOLE SODIUM 20 MG/1
TABLET, DELAYED RELEASE ORAL
Qty: 90 TABLET | Refills: 1 | Status: SHIPPED | OUTPATIENT
Start: 2019-05-02 | End: 2019-10-03 | Stop reason: SDUPTHER

## 2019-05-02 RX ORDER — PANTOPRAZOLE SODIUM 20 MG/1
TABLET, DELAYED RELEASE ORAL
Qty: 30 TABLET | Refills: 4 | OUTPATIENT
Start: 2019-05-02

## 2019-05-24 ENCOUNTER — OFFICE VISIT (OUTPATIENT)
Dept: FAMILY MEDICINE CLINIC | Facility: CLINIC | Age: 39
End: 2019-05-24

## 2019-05-24 VITALS
HEIGHT: 66 IN | WEIGHT: 183 LBS | SYSTOLIC BLOOD PRESSURE: 100 MMHG | BODY MASS INDEX: 29.41 KG/M2 | DIASTOLIC BLOOD PRESSURE: 76 MMHG | HEART RATE: 83 BPM | OXYGEN SATURATION: 98 %

## 2019-05-24 DIAGNOSIS — G47.00 INSOMNIA, UNSPECIFIED TYPE: Primary | ICD-10-CM

## 2019-05-24 DIAGNOSIS — G43.009 MIGRAINE WITHOUT AURA AND WITHOUT STATUS MIGRAINOSUS, NOT INTRACTABLE: ICD-10-CM

## 2019-05-24 PROCEDURE — 99214 OFFICE O/P EST MOD 30 MIN: CPT | Performed by: NURSE PRACTITIONER

## 2019-05-24 RX ORDER — BUPROPION HYDROCHLORIDE 150 MG/1
150 TABLET ORAL DAILY
Qty: 30 TABLET | Refills: 5 | Status: SHIPPED | OUTPATIENT
Start: 2019-05-24 | End: 2020-01-16

## 2019-05-24 RX ORDER — ZOLPIDEM TARTRATE 5 MG/1
5 TABLET ORAL NIGHTLY PRN
Qty: 30 TABLET | Refills: 1 | Status: SHIPPED | OUTPATIENT
Start: 2019-05-24 | End: 2019-08-23 | Stop reason: SDUPTHER

## 2019-06-01 NOTE — PROGRESS NOTES
Subjective   Dianna Clay is a 39 y.o. female.     Needs new PCP Dr. Monet retired  Chronic GERD  Acid reflux  Takes omeprazole chronically discussed risk and benefit risk of long-term use may include osteoporosis kidney failure  Mineral deficiency  Strategy to wean off she has no history of Gann's  Does not abuse and    R/b  egd colonoscopy  ? Ulcer    ibs  Diet controlled    Chronic anxiety stable  Insomnia  Takes Ambien  Bupropion depression helps  Denies history drug or alcohol abuse  No strong family history  Discussed risk of long-term Ambien short-term Ambien I do not prescribe this for long-term insomnia discussed alternatives including being sleep specialist sleep counselor  Psychologist, weaning alternatives  Agreed to help wean patient    Topamax to control migraine helpful discussed risk benefit may decrease appetite  Low risk but potentially serious chronic paresthesias nervous disorders cognitive disorders may be permanent  Risk of uncontrolled migraine quite severe as well quality of life stroke  Discussed appropriate treatment length of treatment  Answer questions regarding phentermine I do not recommend I do not prescribe  Healthy diet  Healthy food choices decreasing processed food      Migraines controlled      Divorce  Last kid  12  13  Son   Daughter         Wt gain after divorce after divorce         The following portions of the patient's history were reviewed and updated as appropriate: allergies, current medications, past family history, past medical history, past social history, past surgical history and problem list.    Review of Systems   Psychiatric/Behavioral: Positive for sleep disturbance. The patient is nervous/anxious.    All other systems reviewed and are negative.      Objective   Physical Exam   Constitutional: She is oriented to person, place, and time. She appears well-developed and well-nourished. No distress.   HENT:   Head: Normocephalic.   Eyes: Conjunctivae are  normal. Pupils are equal, round, and reactive to light.   Neck: Neck supple.   Cardiovascular: Exam reveals no friction rub.   No murmur heard.  Pulmonary/Chest: Effort normal. No respiratory distress. She has no wheezes.   Musculoskeletal: She exhibits no edema.   Neurological: She is alert and oriented to person, place, and time.   Skin: Skin is warm and dry.   Psychiatric: She has a normal mood and affect. Her behavior is normal. Judgment and thought content normal.   Vitals reviewed.        Assessment/Plan   Dianna was seen today for establish care.    Diagnoses and all orders for this visit:    Insomnia, unspecified type    Migraine without aura and without status migrainosus, not intractable    Other orders  -     zolpidem (AMBIEN) 5 MG tablet; Take 1 tablet by mouth At Night As Needed for Sleep.  -     topiramate (TOPAMAX) 200 MG tablet; Take 1 tablet by mouth Daily.  -     buPROPion XL (WELLBUTRIN XL) 150 MG 24 hr tablet; Take 1 tablet by mouth Daily.      Reviewed [facility] controlled substance agreement urine drug screen  As part of this patient's treatment plan I am prescribing controlled substances. The patient has been made aware of appropriate use of such medications, including potential risk of somnolence, limited ability to drive and/or work safely, and potential for dependence or overdose. It has also been made clear that these medications are for use by this patient only, without concomitant use of alcohol or other substances unless prescribed.    Patient has completed prescribing agreement detailing terms of continued prescribing of controlled substances, including monitoring AWILDA reports, urine drug screening, and pill counts if necessary. The patient is aware that inappropriate use will result in cessation of prescribing such medications.            Patient Instructions   Discharge instructions    Generic Ambien 5 mg at bedtime  X30 days  Then decrease to 1/2 tablet  X30 days  Then  discontinue    Sleep hygiene    Follow-up in the office 6 months    1400 marixa a day  No less than 1200  Lots of vegetable  s decrease processed foods breads pastas  Etc.  Office visit 25 minutes greater than 50% counseling

## 2019-06-17 ENCOUNTER — OFFICE VISIT (OUTPATIENT)
Dept: FAMILY MEDICINE CLINIC | Facility: CLINIC | Age: 39
End: 2019-06-17

## 2019-06-17 VITALS
RESPIRATION RATE: 18 BRPM | SYSTOLIC BLOOD PRESSURE: 124 MMHG | BODY MASS INDEX: 29.41 KG/M2 | HEIGHT: 66 IN | OXYGEN SATURATION: 99 % | WEIGHT: 183 LBS | HEART RATE: 96 BPM | TEMPERATURE: 98.4 F | DIASTOLIC BLOOD PRESSURE: 68 MMHG

## 2019-06-17 DIAGNOSIS — L23.7 CONTACT DERMATITIS DUE TO POISON IVY: Primary | ICD-10-CM

## 2019-06-17 PROCEDURE — 99213 OFFICE O/P EST LOW 20 MIN: CPT | Performed by: NURSE PRACTITIONER

## 2019-06-17 PROCEDURE — 96372 THER/PROPH/DIAG INJ SC/IM: CPT | Performed by: NURSE PRACTITIONER

## 2019-06-17 RX ORDER — MINOCYCLINE HYDROCHLORIDE 100 MG/1
100 CAPSULE ORAL 2 TIMES DAILY
Qty: 60 CAPSULE | Refills: 0 | Status: SHIPPED | OUTPATIENT
Start: 2019-06-17 | End: 2019-10-31 | Stop reason: SDUPTHER

## 2019-06-17 RX ORDER — TRIAMCINOLONE ACETONIDE 40 MG/ML
40 INJECTION, SUSPENSION INTRA-ARTICULAR; INTRAMUSCULAR ONCE
Status: COMPLETED | OUTPATIENT
Start: 2019-06-17 | End: 2019-06-17

## 2019-06-17 RX ORDER — PREDNISONE 20 MG/1
40 TABLET ORAL DAILY
Qty: 10 TABLET | Refills: 0 | Status: SHIPPED | OUTPATIENT
Start: 2019-06-17 | End: 2019-06-22

## 2019-06-17 RX ADMIN — TRIAMCINOLONE ACETONIDE 40 MG: 40 INJECTION, SUSPENSION INTRA-ARTICULAR; INTRAMUSCULAR at 11:42

## 2019-06-17 NOTE — PROGRESS NOTES
Subjective   Dianna Clay is a 39 y.o. female.     Chief Complaint   Patient presents with   • Rash     poison ivy   • Rash     different rash on face/ seen derm prev. need refill on med      HPI patient is new to me.  She is here for poison ivy that began approximately a week ago and is getting worse.  She did notice what appears to be poison ivy on trash can where she has been taking out the trash.  She has a new house and was not familiar with all of the poison ivy around it.  Her daughter developed poison ivy about 2 weeks ago.  Patient reports that she herself is very allergic to poison ivy and tends to get very rashy and miserable when it begins.  She is going to [place] at the end of this week and wants to make sure she has adequate treatment prior to going.    She also has a history of some sort of bacterial infection that pops up on her face about twice a year.  It was diagnosed and has been treated by dermatology with metronidazole gel and minocycline.  She is on the minocycline and is asking for refill since she could not get into dermatology.    Social History     Tobacco Use   • Smoking status: Never Smoker   • Smokeless tobacco: Never Used   Substance Use Topics   • Alcohol use: Yes   • Drug use: Not on file       The following portions of the patient's history were reviewed and updated as appropriate: allergies, current medications, past family history, past medical history, past social history, past surgical history and problem list.    Review of Systems   Constitutional: Negative for chills and fever.   Eyes: Negative for itching.   Respiratory: Negative for cough and shortness of breath.    Cardiovascular: Negative for chest pain and palpitations.   Gastrointestinal: Negative for abdominal pain, diarrhea, nausea and vomiting.   Skin: Positive for rash.             Allergic/Immunologic: Positive for environmental allergies.       Objective   Blood pressure 124/68, pulse 96, temperature 98.4 °F  "(36.9 °C), resp. rate 18, height 167.6 cm (65.98\"), weight 83 kg (183 lb), SpO2 99 %, not currently breastfeeding.    Physical Exam   Constitutional: She is oriented to person, place, and time. She appears well-developed and well-nourished. No distress.   HENT:   Head: Normocephalic and atraumatic.   Mouth/Throat: Oropharynx is clear and moist.   Eyes: Conjunctivae are normal. Right eye exhibits no discharge. Left eye exhibits no discharge.   Cardiovascular: Normal rate, regular rhythm and normal heart sounds.   Pulmonary/Chest: Effort normal and breath sounds normal.   Abdominal: Soft. Bowel sounds are normal. There is no tenderness.   Musculoskeletal: She exhibits no deformity.   Gait smooth and steady   Neurological: She is alert and oriented to person, place, and time.   Skin: Skin is warm and dry. She is not diaphoretic.   Contact appearing dermatitis to left hand between 3rd and 4th fingers which is scabbed and excoriated    Scattered lesions noted along b/l UE and LE    No s/s secondary infection    Left face near nose with few erythematous papules with mild erythema   Psychiatric: She has a normal mood and affect.   Nursing note and vitals reviewed.      Assessment   Problem List Items Addressed This Visit     None      Visit Diagnoses     Contact dermatitis due to poison ivy    -  Primary    Relevant Medications    triamcinolone acetonide (KENALOG-40) injection 40 mg (Completed)    predniSONE (DELTASONE) 20 MG tablet           Procedures           Impression and Plan:  Poison ivy:  Kenalog today,  recommend ivyrest topical.  Have given short course of steroids due to limited dermatitis noted.  I have asked her to let me know if she needs an extension on duration.      Abx for facial skin infection:  Will refill her minocycline.  I cautioned her on avoiding sun jass in [facility] due to sun sensitivity on minocycline.       There are no preventive care reminders to display for this patient.           EMR " Dragon/Transcription disclaimer:   Much of this encounter note is an electronic transcription/translation of spoken language to printed text. The electronic translation of spoken language may permit erroneous, or at times, nonsensical words or phrases to be inadvertently transcribed; Although I have reviewed the note for such errors, some may still exist.      Poison Ivy Dermatitis  Poison ivy dermatitis is inflammation of the skin that is caused by the allergens on the leaves of the poison ivy plant. The skin reaction often involves redness, swelling, blisters, and extreme itching.  What are the causes?  This condition is caused by a specific chemical (urushiol) found in the sap of the poison ivy plant. This chemical is sticky and can be easily spread to people, animals, and objects. You can get poison ivy dermatitis by:  · Having direct contact with a poison ivy plant.  · Touching animals, other people, or objects that have come in contact with poison ivy and have the chemical on them.    What increases the risk?  This condition is more likely to develop in:  · People who are outdoors often.  · People who go outdoors without wearing protective clothing, such as closed shoes, long pants, and a long-sleeved shirt.    What are the signs or symptoms?  Symptoms of this condition include:  · Redness and itching.  · A rash that often includes bumps and blisters. The rash usually appears 48 hours after exposure.  · Swelling. This may occur if the reaction is more severe.    Symptoms usually last for 1-2 weeks. However, the first time you develop this condition, symptoms may last 3-4 weeks.  How is this diagnosed?  This condition may be diagnosed based on your symptoms and a physical exam. Your health care provider may also ask you about any recent outdoor activity.  How is this treated?  Treatment for this condition will vary depending on how severe it is. Treatment may include:  · Hydrocortisone creams or calamine  lotions to relieve itching.  · Oatmeal baths to soothe the skin.  · Over-the-counter antihistamine tablets.  · Oral steroid medicine for more severe outbreaks.    Follow these instructions at home:  · Take or apply over-the-counter and prescription medicines only as told by your health care provider.  · Wash exposed skin as soon as possible with soap and cold water.  · Use hydrocortisone creams or calamine lotion as needed to soothe the skin and relieve itching.  · Take oatmeal baths as needed. Use colloidal oatmeal. You can get this at your local pharmacy or grocery store. Follow the instructions on the packaging.  · Do not scratch or rub your skin.  · While you have the rash, wash clothes right after you wear them.  How is this prevented?  · Learn to identify the poison ivy plant and avoid contact with the plant. This plant can be recognized by the number of leaves. Generally, poison ivy has three leaves with flowering branches on a single stem. The leaves are typically glossy, and they have jagged edges that come to a point at the front.  · If you have been exposed to poison ivy, thoroughly wash with soap and water right away. You have about 30 minutes to remove the plant resin before it will cause the rash. Be sure to wash under your fingernails because any plant resin there will continue to spread the rash.  · When hiking or camping, wear clothes that will help you to avoid exposure on the skin. This includes long pants, a long-sleeved shirt, tall socks, and hiking boots. You can also apply preventive lotion to your skin to help limit exposure.  · If you suspect that your clothes or outdoor gear came in contact with poison ivy, rinse them off outside with a garden hose before you bring them inside your house.  Contact a health care provider if:  · You have open sores in the rash area.  · You have more redness, swelling, or pain in the affected area.  · You have redness that spreads beyond the rash area.  · You  have fluid, blood, or pus coming from the affected area.  · You have a fever.  · You have a rash over a large area of your body.  · You have a rash on your eyes, mouth, or genitals.  · Your rash does not improve after a few days.  Get help right away if:  · Your face swells or your eyes swell shut.  · You have trouble breathing.  · You have trouble swallowing.  This information is not intended to replace advice given to you by your health care provider. Make sure you discuss any questions you have with your health care provider.  Document Released: 12/15/2001 Document Revised: 05/31/2018 Document Reviewed: 05/25/2016  [employer] Interactive Patient Education © 2019 [employer]

## 2019-08-21 RX ORDER — ALPRAZOLAM 2 MG/1
TABLET ORAL
Qty: 60 TABLET | OUTPATIENT
Start: 2019-08-21

## 2019-08-21 NOTE — TELEPHONE ENCOUNTER
Please call patient she is calling for  Xanax 2 mg    I know we are weaning her off Ambien I do not recall Xanax  I reviewed her med list I do not see it on her med list   After reviewing her [place] I do see that previously that showed Dr. Monet was prescribing this for her      We do not prescribe benzodiazepines Xanax at this office generally  And Dr. Monet  was weaning much of his patients before leaving for the same reason    That said clarify this if she takes this daily she should not abruptly stop  As well as she may want to discuss other options    She should not abruptly stop this if she takes this medicine frequently and she should return to office promptly so we can discuss and have a plan to wean please advise

## 2019-08-23 RX ORDER — ZOLPIDEM TARTRATE 5 MG/1
TABLET ORAL
Qty: 15 TABLET | Refills: 0 | Status: SHIPPED | OUTPATIENT
Start: 2019-08-23 | End: 2019-10-31

## 2019-10-03 DIAGNOSIS — K21.9 GASTROESOPHAGEAL REFLUX DISEASE WITHOUT ESOPHAGITIS: ICD-10-CM

## 2019-10-03 RX ORDER — PANTOPRAZOLE SODIUM 20 MG/1
TABLET, DELAYED RELEASE ORAL
Qty: 90 TABLET | Refills: 1 | Status: SHIPPED | OUTPATIENT
Start: 2019-10-03 | End: 2020-01-16 | Stop reason: SDUPTHER

## 2019-10-31 RX ORDER — MINOCYCLINE HYDROCHLORIDE 100 MG/1
CAPSULE ORAL
Qty: 60 CAPSULE | Refills: 0 | Status: SHIPPED | OUTPATIENT
Start: 2019-10-31 | End: 2020-01-16 | Stop reason: SDUPTHER

## 2019-11-01 RX ORDER — ALPRAZOLAM 2 MG/1
TABLET ORAL
Qty: 60 TABLET | OUTPATIENT
Start: 2019-11-01

## 2019-11-25 ENCOUNTER — OFFICE VISIT (OUTPATIENT)
Dept: OBSTETRICS AND GYNECOLOGY | Facility: CLINIC | Age: 39
End: 2019-11-25

## 2019-11-25 VITALS
HEIGHT: 66 IN | BODY MASS INDEX: 31.21 KG/M2 | WEIGHT: 194.2 LBS | SYSTOLIC BLOOD PRESSURE: 122 MMHG | DIASTOLIC BLOOD PRESSURE: 80 MMHG

## 2019-11-25 DIAGNOSIS — R63.8 INCREASED BMI: Primary | ICD-10-CM

## 2019-11-25 PROBLEM — Z97.5 INTRAUTERINE CONTRACEPTIVE DEVICE: Status: ACTIVE | Noted: 2019-11-25

## 2019-11-25 PROCEDURE — WTLOSSINITIAL: Performed by: OBSTETRICS & GYNECOLOGY

## 2019-11-25 RX ORDER — PHENTERMINE HYDROCHLORIDE 37.5 MG/1
37.5 TABLET ORAL
Qty: 30 TABLET | Refills: 0 | Status: SHIPPED | OUTPATIENT
Start: 2019-11-25 | End: 2019-12-23 | Stop reason: SDUPTHER

## 2019-11-25 NOTE — PROGRESS NOTES
Patient Care Team:  Epley, James, APRN as PCP - General (Family Medicine)  Marshall Monet MD as PCP - Family Medicine    Patient new to practice? Yes  Patient new to me? Yes    Chief Complaint:   Chief Complaint   Patient presents with   • Weight Check       HPI: History taken from: patient            No LMP recorded (exact date). Patient has had an implant.           Pt denies any problems or side effects.    History reviewed. No pertinent past medical history.    Review of Systems   Constitutional: Negative.    HENT: Negative.    Eyes: Negative.    Respiratory: Negative.    Cardiovascular: Negative.    Gastrointestinal: Negative.    Endocrine: Negative.    Musculoskeletal: Negative.    Skin: Negative.    Allergic/Immunologic: Negative.    Neurological: Negative.    Hematological: Negative.    Psychiatric/Behavioral: Negative.          Current Outpatient Medications:   •  buPROPion XL (WELLBUTRIN XL) 150 MG 24 hr tablet, Take 1 tablet by mouth Daily., Disp: 30 tablet, Rfl: 5  •  fluticasone (FLONASE) 50 MCG/ACT nasal spray, INSTILL TWO SPRAYS INTO EACH NOSTRIL DAILY, Disp: , Rfl: 0  •  KELNOR 1/35 1-35 MG-MCG per tablet, TAKE ONE TABLET BY MOUTH DAILY, Disp: , Rfl: 11  •  levonorgestrel (MIRENA, 52 MG,) 20 MCG/24HR IUD, 1 each by Intrauterine route., Disp: , Rfl:   •  minocycline (MINOCIN,DYNACIN) 100 MG capsule, TAKE ONE CAPSULE BY MOUTH TWICE DAILY, Disp: 60 capsule, Rfl: 0  •  ondansetron (ZOFRAN) 4 MG tablet, Take 1 tablet by mouth Every 6 (Six) Hours As Needed for Nausea or Vomiting., Disp: 15 tablet, Rfl: 0  •  pantoprazole (PROTONIX) 20 MG EC tablet, TAKE ONE TABLET BY MOUTH EVERY DAY, Disp: 90 tablet, Rfl: 1  •  phentermine (ADIPEX-P) 37.5 MG tablet, Take 1 tablet by mouth Every Morning Before Breakfast., Disp: 30 tablet, Rfl: 0  •  topiramate (TOPAMAX) 200 MG tablet, Take 1 tablet by mouth Daily., Disp: 30 tablet, Rfl: 5    Social History:  Smoker: No                                Alcohol: Yes           "                     Recreational drugs: No    Objective    Vital Signs  BP: (122)/(80) 122/80    Flowsheet Rows      First Filed Value   Admission Height  167.6 cm (65.98\") Documented at 11/25/2019 1428   Admission Weight  88.1 kg (194 lb 3.2 oz) Documented at 11/25/2019 1428          Physical Exam: NOT DONE    Physical Exam   Constitutional: She is oriented to person, place, and time. She appears well-developed and well-nourished.   HENT:   Head: Normocephalic and atraumatic.   Eyes: EOM are normal.   Neck: Normal range of motion.   Pulmonary/Chest: Effort normal.   Abdominal: Soft.   Musculoskeletal: Normal range of motion.   Neurological: She is alert and oriented to person, place, and time.   Skin: Skin is warm and dry.   Psychiatric: She has a normal mood and affect. Her behavior is normal. Judgment and thought content normal.   Nursing note and vitals reviewed.          Lab Results (last 24 hours)     ** No results found for the last 24 hours. **          Imaging Results (Last 24 Hours)     ** No results found for the last 24 hours. **            ECG/EMG Results (most recent)     None          Medication Review:   I have reviewed the patient's current medication list    Current Outpatient Medications:   •  buPROPion XL (WELLBUTRIN XL) 150 MG 24 hr tablet, Take 1 tablet by mouth Daily., Disp: 30 tablet, Rfl: 5  •  fluticasone (FLONASE) 50 MCG/ACT nasal spray, INSTILL TWO SPRAYS INTO EACH NOSTRIL DAILY, Disp: , Rfl: 0  •  KELNOR 1/35 1-35 MG-MCG per tablet, TAKE ONE TABLET BY MOUTH DAILY, Disp: , Rfl: 11  •  levonorgestrel (MIRENA, 52 MG,) 20 MCG/24HR IUD, 1 each by Intrauterine route., Disp: , Rfl:   •  minocycline (MINOCIN,DYNACIN) 100 MG capsule, TAKE ONE CAPSULE BY MOUTH TWICE DAILY, Disp: 60 capsule, Rfl: 0  •  ondansetron (ZOFRAN) 4 MG tablet, Take 1 tablet by mouth Every 6 (Six) Hours As Needed for Nausea or Vomiting., Disp: 15 tablet, Rfl: 0  •  pantoprazole (PROTONIX) 20 MG EC tablet, TAKE ONE TABLET " BY MOUTH EVERY DAY, Disp: 90 tablet, Rfl: 1  •  phentermine (ADIPEX-P) 37.5 MG tablet, Take 1 tablet by mouth Every Morning Before Breakfast., Disp: 30 tablet, Rfl: 0  •  topiramate (TOPAMAX) 200 MG tablet, Take 1 tablet by mouth Daily., Disp: 30 tablet, Rfl: 5    IMPRESSION: INCREASED BMI ON WT LOSS PROGRAM INCLUDING PHENTERMINE    Plan for disposition:  REFILL PHENTERMINE, RTO 1 MONTH.  INSTRUCTIONS, PRECAUTIONS AND WARNINGS REVIEWED.    Possible interactions with other meds, especially wellbutrin reviewed with pt who verbalized her understanding.     Napoleon Oconnell MD    11/25/19  2:58 PM

## 2019-12-23 ENCOUNTER — OFFICE VISIT (OUTPATIENT)
Dept: OBSTETRICS AND GYNECOLOGY | Facility: CLINIC | Age: 39
End: 2019-12-23

## 2019-12-23 VITALS
HEIGHT: 66 IN | SYSTOLIC BLOOD PRESSURE: 120 MMHG | WEIGHT: 190 LBS | DIASTOLIC BLOOD PRESSURE: 80 MMHG | BODY MASS INDEX: 30.53 KG/M2

## 2019-12-23 DIAGNOSIS — R63.8 INCREASED BMI: Primary | ICD-10-CM

## 2019-12-23 PROCEDURE — WTLOSSFOLLUP: Performed by: OBSTETRICS & GYNECOLOGY

## 2019-12-23 RX ORDER — PHENTERMINE HYDROCHLORIDE 37.5 MG/1
37.5 TABLET ORAL
Qty: 30 TABLET | Refills: 0 | Status: SHIPPED | OUTPATIENT
Start: 2019-12-23 | End: 2020-01-20 | Stop reason: SDUPTHER

## 2019-12-23 NOTE — PROGRESS NOTES
Patient Care Team:  Epley, James, APRN as PCP - General (Family Medicine)  Marshall Monet MD as PCP - Family Medicine    Patient new to practice? No  Patient new to me? No    Chief Complaint:   Chief Complaint   Patient presents with   • Follow-up     weight check        HPI: History taken from: patient            No LMP recorded. Patient has had an implant.           Pt denies any problems or side effects.    History reviewed. No pertinent past medical history.    Review of Systems   Constitutional: Negative.    HENT: Negative.    Eyes: Negative.    Respiratory: Negative.    Cardiovascular: Negative.    Gastrointestinal: Negative.    Endocrine: Negative.    Musculoskeletal: Negative.    Skin: Negative.    Allergic/Immunologic: Negative.    Neurological: Negative.    Hematological: Negative.    Psychiatric/Behavioral: Negative.          Current Outpatient Medications:   •  buPROPion XL (WELLBUTRIN XL) 150 MG 24 hr tablet, Take 1 tablet by mouth Daily., Disp: 30 tablet, Rfl: 5  •  fluticasone (FLONASE) 50 MCG/ACT nasal spray, INSTILL TWO SPRAYS INTO EACH NOSTRIL DAILY, Disp: , Rfl: 0  •  KELNOR 1/35 1-35 MG-MCG per tablet, TAKE ONE TABLET BY MOUTH DAILY, Disp: , Rfl: 11  •  levonorgestrel (MIRENA, 52 MG,) 20 MCG/24HR IUD, 1 each by Intrauterine route., Disp: , Rfl:   •  minocycline (MINOCIN,DYNACIN) 100 MG capsule, TAKE ONE CAPSULE BY MOUTH TWICE DAILY, Disp: 60 capsule, Rfl: 0  •  ondansetron (ZOFRAN) 4 MG tablet, Take 1 tablet by mouth Every 6 (Six) Hours As Needed for Nausea or Vomiting., Disp: 15 tablet, Rfl: 0  •  pantoprazole (PROTONIX) 20 MG EC tablet, TAKE ONE TABLET BY MOUTH EVERY DAY, Disp: 90 tablet, Rfl: 1  •  phentermine (ADIPEX-P) 37.5 MG tablet, Take 1 tablet by mouth Every Morning Before Breakfast., Disp: 30 tablet, Rfl: 0  •  topiramate (TOPAMAX) 200 MG tablet, Take 1 tablet by mouth Daily., Disp: 30 tablet, Rfl: 5    Social History:  Smoker: No                                Alcohol: No            "                    Recreational drugs: No    Objective    Vital Signs  BP: (120)/(80) 120/80    Flowsheet Rows      First Filed Value   Admission Height  167.6 cm (65.98\") Documented at 12/23/2019 1527   Admission Weight  86.2 kg (190 lb) Documented at 12/23/2019 1527          Physical Exam: NOT DONE    Physical Exam   Constitutional: She is oriented to person, place, and time. She appears well-developed and well-nourished.   HENT:   Head: Normocephalic and atraumatic.   Eyes: EOM are normal.   Neck: Normal range of motion.   Pulmonary/Chest: Effort normal.   Abdominal: Soft.   Musculoskeletal: Normal range of motion.   Neurological: She is alert and oriented to person, place, and time.   Skin: Skin is warm and dry.   Psychiatric: She has a normal mood and affect. Her behavior is normal. Judgment and thought content normal.   Nursing note and vitals reviewed.          Lab Results (last 24 hours)     ** No results found for the last 24 hours. **          Imaging Results (Last 24 Hours)     ** No results found for the last 24 hours. **            ECG/EMG Results (most recent)     None          Medication Review:   I have reviewed the patient's current medication list    Current Outpatient Medications:   •  buPROPion XL (WELLBUTRIN XL) 150 MG 24 hr tablet, Take 1 tablet by mouth Daily., Disp: 30 tablet, Rfl: 5  •  fluticasone (FLONASE) 50 MCG/ACT nasal spray, INSTILL TWO SPRAYS INTO EACH NOSTRIL DAILY, Disp: , Rfl: 0  •  KELNOR 1/35 1-35 MG-MCG per tablet, TAKE ONE TABLET BY MOUTH DAILY, Disp: , Rfl: 11  •  levonorgestrel (MIRENA, 52 MG,) 20 MCG/24HR IUD, 1 each by Intrauterine route., Disp: , Rfl:   •  minocycline (MINOCIN,DYNACIN) 100 MG capsule, TAKE ONE CAPSULE BY MOUTH TWICE DAILY, Disp: 60 capsule, Rfl: 0  •  ondansetron (ZOFRAN) 4 MG tablet, Take 1 tablet by mouth Every 6 (Six) Hours As Needed for Nausea or Vomiting., Disp: 15 tablet, Rfl: 0  •  pantoprazole (PROTONIX) 20 MG EC tablet, TAKE ONE TABLET BY MOUTH " EVERY DAY, Disp: 90 tablet, Rfl: 1  •  phentermine (ADIPEX-P) 37.5 MG tablet, Take 1 tablet by mouth Every Morning Before Breakfast., Disp: 30 tablet, Rfl: 0  •  topiramate (TOPAMAX) 200 MG tablet, Take 1 tablet by mouth Daily., Disp: 30 tablet, Rfl: 5    IMPRESSION: INCREASED BMI ON WT LOSS PROGRAM INCLUDING PHENTERMINE    Plan for disposition:  REFILL PHENTERMINE, RTO 1 MONTH.  INSTRUCTIONS, PRECAUTIONS AND WARNINGS REVIEWED.        Napoleon Oconnell MD    12/23/19  3:42 PM

## 2020-01-16 ENCOUNTER — OFFICE VISIT (OUTPATIENT)
Dept: FAMILY MEDICINE CLINIC | Facility: CLINIC | Age: 40
End: 2020-01-16

## 2020-01-16 VITALS
BODY MASS INDEX: 30.05 KG/M2 | HEART RATE: 79 BPM | OXYGEN SATURATION: 96 % | HEIGHT: 66 IN | WEIGHT: 187 LBS | SYSTOLIC BLOOD PRESSURE: 129 MMHG | DIASTOLIC BLOOD PRESSURE: 78 MMHG

## 2020-01-16 DIAGNOSIS — F41.8 MIXED ANXIETY DEPRESSIVE DISORDER: Primary | ICD-10-CM

## 2020-01-16 DIAGNOSIS — L70.0 ACNE VULGARIS: ICD-10-CM

## 2020-01-16 DIAGNOSIS — K21.9 GASTROESOPHAGEAL REFLUX DISEASE WITHOUT ESOPHAGITIS: ICD-10-CM

## 2020-01-16 DIAGNOSIS — F51.01 PRIMARY INSOMNIA: ICD-10-CM

## 2020-01-16 PROCEDURE — 99214 OFFICE O/P EST MOD 30 MIN: CPT | Performed by: NURSE PRACTITIONER

## 2020-01-16 RX ORDER — MINOCYCLINE HYDROCHLORIDE 100 MG/1
100 CAPSULE ORAL DAILY
Qty: 60 CAPSULE | Refills: 0 | Status: SHIPPED | OUTPATIENT
Start: 2020-01-16 | End: 2020-07-23 | Stop reason: SDUPTHER

## 2020-01-16 RX ORDER — PANTOPRAZOLE SODIUM 20 MG/1
20 TABLET, DELAYED RELEASE ORAL DAILY
Qty: 90 TABLET | Refills: 1 | Status: SHIPPED | OUTPATIENT
Start: 2020-01-16 | End: 2020-07-02

## 2020-01-16 RX ORDER — FLUOXETINE HYDROCHLORIDE 20 MG/1
20 CAPSULE ORAL DAILY
Qty: 30 CAPSULE | Refills: 1 | Status: SHIPPED | OUTPATIENT
Start: 2020-01-16 | End: 2020-02-24

## 2020-01-16 RX ORDER — TRAZODONE HYDROCHLORIDE 50 MG/1
50 TABLET ORAL NIGHTLY
Qty: 30 TABLET | Refills: 3 | Status: SHIPPED | OUTPATIENT
Start: 2020-01-16 | End: 2020-04-15

## 2020-01-16 NOTE — PATIENT INSTRUCTIONS
Discharge instructions      Trazodone 50 mg at bedtime as needed for insomnia caution sedation falls  Okay to increase to 2 if needed    Starting tomorrow fluoxetine 20 mg every other day the first week then daily  In 4 to 6 weeks follow-up call or email    Severe depression severe anxiety suicidal ideation emergency room  Relaxation deep breathing    Decrease caffeinated beverages decrease sugar in the evenings    Continue healthy diet  Exercise  Girlfriends

## 2020-01-19 PROBLEM — L70.0 ACNE VULGARIS: Status: ACTIVE | Noted: 2020-01-19

## 2020-01-19 NOTE — PROGRESS NOTES
"Subjective   Dianna Clay is a 40 y.o. female.     Chronic depression anxiety   2 Years Ago Recent court case  Ex-husbands talking bad towards her to her daughter  Increased anxiety would like to start Prozac did well previously I believe no problems she has no history chasity no severe depression or suicide ideation or agitation  Insomnia previously took Ambien requesting another medication  Acne  Flare-ups at times has been taken Minocycline previously hundred milligram twice daily  We discussed risk and benefit Minocycline need to avoid excessive sunlight risk of serious skin reactions as well as  Blood dyscrasic is need for monitoring cmp cbc  Lowest effective dose shortest time possible  We discussed alternatives including logos benzal peroxide 2.5%  Cream or Clindamycin  She has a small patch around her left cheek chin presently no pain    History of chronic acid reflux we discussed risk and benefit PPIs  Alternative strategy avoid chronic use  Dietary changes    Topamax to control migraines presently controlledfor the occasional migraine once or twice a month  Risk-benefit Topamax discussed other alternatives and newer break the medications  She wishes to continue    Recently given phentermine for weight gain associated with anxiety  Dr. Fernandez  She's have a no increased anxiety or side effects phentermine    Social history denies alcohol drug abuse  ,  Dating someone presently  Careful to be sensitive to her kids        /78   Pulse 79   Ht 167.6 cm (65.98\")   Wt 84.8 kg (187 lb)   SpO2 96%   BMI 30.20 kg/m²       The following portions of the patient's history were reviewed and updated as appropriate: allergies, current medications, past family history, past medical history, past social history, past surgical history and problem list.    Review of Systems   Constitutional: Negative for chills, fatigue, fever and unexpected weight loss.   HENT: Negative.  Negative for trouble " swallowing.    Eyes: Negative.    Respiratory: Negative for cough and shortness of breath.    Cardiovascular: Negative for chest pain, palpitations and leg swelling.   Gastrointestinal: Negative for abdominal pain and blood in stool.   Genitourinary: Negative.  Negative for pelvic pain.   Musculoskeletal: Negative.    Skin: Negative.         acne   Neurological: Negative.  Negative for dizziness, speech difficulty, weakness and confusion.   Psychiatric/Behavioral: Negative.        Objective   Physical Exam   Constitutional: She is oriented to person, place, and time. She appears well-developed and well-nourished.   HENT:   Head: Normocephalic.   Mouth/Throat: Oropharynx is clear and moist. No oropharyngeal exudate.   Eyes: Pupils are equal, round, and reactive to light. Conjunctivae are normal.   Neck: Neck supple. No JVD present.   Cardiovascular: Normal rate, regular rhythm and normal heart sounds. Exam reveals no friction rub.   No murmur heard.  Pulmonary/Chest: Effort normal and breath sounds normal. No respiratory distress. She has no wheezes.   Abdominal: Soft. Bowel sounds are normal. She exhibits no distension and no mass. There is no tenderness. There is no guarding. No hernia.   Musculoskeletal: She exhibits no edema or tenderness.   Lymphadenopathy:     She has no cervical adenopathy.   Neurological: She is alert and oriented to person, place, and time.   Skin: Skin is warm and dry.   Six or seven small resolving condoms  Small resolving pustules   Psychiatric: She has a normal mood and affect. Her behavior is normal. Judgment and thought content normal.   Vitals reviewed.        Assessment/Plan   Dianna was seen today for discuss medication.    Diagnoses and all orders for this visit:    Primary insomnia    Gastroesophageal reflux disease without esophagitis  -     pantoprazole (PROTONIX) 20 MG EC tablet; Take 1 tablet by mouth Daily.    Mixed anxiety depressive disorder    Other orders  -      minocycline (MINOCIN,DYNACIN) 100 MG capsule; Take 1 capsule by mouth Daily. 10 to 14 days as needed for acne flare  -     topiramate (TOPAMAX) 200 MG tablet; Take 1 tablet by mouth Daily.  -     FLUoxetine (PROZAC) 20 MG capsule; Take 1 capsule by mouth Daily.  -     traZODone (DESYREL) 50 MG tablet; Take 1 tablet by mouth Every Night. As needed for insomnia    Anxiety depression  Follow instructions below  Healthy social activities continue  Discuss acting treatment  Decrease Minocycline and shorter intervals  One daily should suffice avoid direct sunlight  Gerd dietary changes  Insomnia sleep hygiene Trazodone risk-benefit  Avoid chronic use  Follow-up in six weeks  If she's doing well she can follow up by email or phone  Discuss expectations and appropriate use    Risk-benefit alternatives          Patient Instructions   Discharge instructions      Trazodone 50 mg at bedtime as needed for insomnia caution sedation falls  Okay to increase to 2 if needed    Starting tomorrow fluoxetine 20 mg every other day the first week then daily  In 4 to 6 weeks follow-up call or email    Severe depression severe anxiety suicidal ideation emergency room  Relaxation deep breathing    Decrease caffeinated beverages decrease sugar in the evenings    Continue healthy diet  Exercise  Girlfriends

## 2020-01-20 ENCOUNTER — OFFICE VISIT (OUTPATIENT)
Dept: OBSTETRICS AND GYNECOLOGY | Facility: CLINIC | Age: 40
End: 2020-01-20

## 2020-01-20 VITALS
HEIGHT: 66 IN | DIASTOLIC BLOOD PRESSURE: 80 MMHG | BODY MASS INDEX: 30.05 KG/M2 | SYSTOLIC BLOOD PRESSURE: 120 MMHG | WEIGHT: 187 LBS

## 2020-01-20 DIAGNOSIS — R63.8 INCREASED BMI: Primary | ICD-10-CM

## 2020-01-20 PROCEDURE — WTLOSSFOLLUP: Performed by: OBSTETRICS & GYNECOLOGY

## 2020-01-20 RX ORDER — PHENTERMINE HYDROCHLORIDE 37.5 MG/1
37.5 TABLET ORAL
Qty: 30 TABLET | Refills: 0 | Status: SHIPPED | OUTPATIENT
Start: 2020-01-20 | End: 2020-02-10 | Stop reason: SDUPTHER

## 2020-01-20 NOTE — PROGRESS NOTES
Patient Care Team:  Epley, James, APRN as PCP - General (Family Medicine)  Marshall Monet MD as PCP - Family Medicine    Patient new to practice? No  Patient new to me? No    Chief Complaint:   Chief Complaint   Patient presents with   • Follow-up     weight check        HPI: History taken from: patient            No LMP recorded. Patient has had an implant.           Pt denies any problems or side effects.    History reviewed. No pertinent past medical history.    Review of Systems   Constitutional: Negative.    HENT: Negative.    Eyes: Negative.    Respiratory: Negative.    Cardiovascular: Negative.    Gastrointestinal: Negative.    Endocrine: Negative.    Genitourinary: Negative.    Musculoskeletal: Negative.    Skin: Negative.    Allergic/Immunologic: Negative.    Neurological: Negative.    Hematological: Negative.    Psychiatric/Behavioral: Negative.          Current Outpatient Medications:   •  FLUoxetine (PROZAC) 20 MG capsule, Take 1 capsule by mouth Daily., Disp: 30 capsule, Rfl: 1  •  fluticasone (FLONASE) 50 MCG/ACT nasal spray, INSTILL TWO SPRAYS INTO EACH NOSTRIL DAILY, Disp: , Rfl: 0  •  levonorgestrel (MIRENA, 52 MG,) 20 MCG/24HR IUD, 1 each by Intrauterine route., Disp: , Rfl:   •  minocycline (MINOCIN,DYNACIN) 100 MG capsule, Take 1 capsule by mouth Daily. 10 to 14 days as needed for acne flare, Disp: 60 capsule, Rfl: 0  •  ondansetron (ZOFRAN) 4 MG tablet, Take 1 tablet by mouth Every 6 (Six) Hours As Needed for Nausea or Vomiting., Disp: 15 tablet, Rfl: 0  •  pantoprazole (PROTONIX) 20 MG EC tablet, Take 1 tablet by mouth Daily., Disp: 90 tablet, Rfl: 1  •  phentermine (ADIPEX-P) 37.5 MG tablet, Take 1 tablet by mouth Every Morning Before Breakfast., Disp: 30 tablet, Rfl: 0  •  topiramate (TOPAMAX) 200 MG tablet, Take 1 tablet by mouth Daily., Disp: 90 tablet, Rfl: 1  •  traZODone (DESYREL) 50 MG tablet, Take 1 tablet by mouth Every Night. As needed for insomnia, Disp: 30 tablet, Rfl:  3    Social History:  Smoker: No                                Alcohol: No                               Recreational drugs: No    Objective    Vital Signs  BP: (120)/(80) 120/80        Physical Exam: NOT DONE    Physical Exam   Constitutional: She is oriented to person, place, and time. She appears well-developed and well-nourished.   HENT:   Head: Normocephalic and atraumatic.   Eyes: EOM are normal.   Neck: Normal range of motion.   Pulmonary/Chest: Effort normal.   Abdominal: Soft.   Musculoskeletal: Normal range of motion.   Neurological: She is alert and oriented to person, place, and time.   Skin: Skin is warm and dry.   Psychiatric: She has a normal mood and affect. Her behavior is normal. Judgment and thought content normal.   Nursing note and vitals reviewed.          Lab Results (last 24 hours)     ** No results found for the last 24 hours. **          Imaging Results (Last 24 Hours)     ** No results found for the last 24 hours. **            ECG/EMG Results (most recent)     None          Medication Review:   I have reviewed the patient's current medication list    Current Outpatient Medications:   •  FLUoxetine (PROZAC) 20 MG capsule, Take 1 capsule by mouth Daily., Disp: 30 capsule, Rfl: 1  •  fluticasone (FLONASE) 50 MCG/ACT nasal spray, INSTILL TWO SPRAYS INTO EACH NOSTRIL DAILY, Disp: , Rfl: 0  •  levonorgestrel (MIRENA, 52 MG,) 20 MCG/24HR IUD, 1 each by Intrauterine route., Disp: , Rfl:   •  minocycline (MINOCIN,DYNACIN) 100 MG capsule, Take 1 capsule by mouth Daily. 10 to 14 days as needed for acne flare, Disp: 60 capsule, Rfl: 0  •  ondansetron (ZOFRAN) 4 MG tablet, Take 1 tablet by mouth Every 6 (Six) Hours As Needed for Nausea or Vomiting., Disp: 15 tablet, Rfl: 0  •  pantoprazole (PROTONIX) 20 MG EC tablet, Take 1 tablet by mouth Daily., Disp: 90 tablet, Rfl: 1  •  phentermine (ADIPEX-P) 37.5 MG tablet, Take 1 tablet by mouth Every Morning Before Breakfast., Disp: 30 tablet, Rfl: 0  •   topiramate (TOPAMAX) 200 MG tablet, Take 1 tablet by mouth Daily., Disp: 90 tablet, Rfl: 1  •  traZODone (DESYREL) 50 MG tablet, Take 1 tablet by mouth Every Night. As needed for insomnia, Disp: 30 tablet, Rfl: 3    IMPRESSION: INCREASED BMI ON WT LOSS PROGRAM INCLUDING PHENTERMINE    Plan for disposition:  REFILL PHENTERMINE, RTO 1 MONTH.  INSTRUCTIONS, PRECAUTIONS AND WARNINGS REVIEWED.        Napoleon Oconnell MD    01/20/20  9:55 AM

## 2020-01-28 ENCOUNTER — OFFICE VISIT (OUTPATIENT)
Dept: FAMILY MEDICINE CLINIC | Facility: CLINIC | Age: 40
End: 2020-01-28

## 2020-01-28 VITALS
BODY MASS INDEX: 29.09 KG/M2 | HEIGHT: 66 IN | HEART RATE: 77 BPM | DIASTOLIC BLOOD PRESSURE: 70 MMHG | OXYGEN SATURATION: 98 % | TEMPERATURE: 97.9 F | WEIGHT: 181 LBS | SYSTOLIC BLOOD PRESSURE: 110 MMHG

## 2020-01-28 DIAGNOSIS — J11.1 INFLUENZA: Primary | ICD-10-CM

## 2020-01-28 PROCEDURE — 99214 OFFICE O/P EST MOD 30 MIN: CPT | Performed by: NURSE PRACTITIONER

## 2020-01-28 RX ORDER — OSELTAMIVIR PHOSPHATE 75 MG/1
75 CAPSULE ORAL 2 TIMES DAILY
Qty: 10 CAPSULE | Refills: 0 | Status: SHIPPED | OUTPATIENT
Start: 2020-01-28 | End: 2020-02-02

## 2020-01-28 NOTE — PATIENT INSTRUCTIONS
Discharge instructions    Push fluids  64 ounces a day  Ibuprofen 800  3 times a day for body aches fever  If starting Tamiflu start within the next couple hours  Chest pain high fever lethargy weakness emergency room

## 2020-01-28 NOTE — PROGRESS NOTES
"Subjective   Dianna Clay is a 40 y.o. female.     Patient complains scratchy throat cough today fatigue myalgias  Cough and scratchy throat started yesterday  Started having some fatigue mostly last Thursday night Friday and a few body aches but respiratory symptoms started yesterday  Has felt feverish the last several days  No chest pain no shortness of breath no recent travel [place]  1 of her kids were diagnosed with influenza B last week  Taken some ibuprofen helps some  Severe symptoms yesterday fatigue could not get out of bed feel little better today    Past medical history no pulmonary disease    She did not get a flu shot this year but plans to do so next year    Influenza   Associated symptoms include coughing, fatigue and a sore throat. Pertinent negatives include no abdominal pain, chest pain, chills, fever or weakness.        /70   Pulse 77   Temp 97.9 °F (36.6 °C) (Oral)   Ht 167.6 cm (65.98\")   Wt 82.1 kg (181 lb)   SpO2 98%   BMI 29.23 kg/m²       The following portions of the patient's history were reviewed and updated as appropriate: allergies, current medications, past family history, past medical history, past social history, past surgical history and problem list.    Review of Systems   Constitutional: Positive for fatigue. Negative for chills, fever and unexpected weight loss.   HENT: Positive for postnasal drip and sore throat. Negative for trouble swallowing.    Eyes: Negative.    Respiratory: Positive for cough. Negative for shortness of breath.    Cardiovascular: Negative for chest pain, palpitations and leg swelling.   Gastrointestinal: Negative for abdominal pain and blood in stool.   Genitourinary: Negative.  Negative for pelvic pain.   Musculoskeletal: Negative.    Skin: Negative.    Neurological: Negative.  Negative for dizziness, speech difficulty, weakness and confusion.   Psychiatric/Behavioral: Negative.        Objective   Physical Exam   Constitutional: She is " oriented to person, place, and time. She appears well-developed and well-nourished. No distress.   Pleasant nontoxic   HENT:   Head: Normocephalic.   Mouth/Throat: Oropharynx is clear and moist.   Turbinates congested   Eyes: Pupils are equal, round, and reactive to light. Conjunctivae are normal.   Neck: Normal range of motion. Neck supple. No thyromegaly present.   Cardiovascular: Exam reveals no friction rub.   No murmur heard.  Pulmonary/Chest: Effort normal. No respiratory distress. She has no wheezes.   Chest is clear unlabored respirations less than 20  Able to speak full sentences without dyspnea   Musculoskeletal: She exhibits no edema.   Lymphadenopathy:     She has no cervical adenopathy.   Neurological: She is alert and oriented to person, place, and time.   Skin: Skin is warm and dry.   Psychiatric: She has a normal mood and affect. Her behavior is normal. Judgment and thought content normal.   Vitals reviewed.        Assessment/Plan   Dianna was seen today for influenza.    Diagnoses and all orders for this visit:    Influenza    Other orders  -     oseltamivir (TAMIFLU) 75 MG capsule; Take 1 capsule by mouth 2 (Two) Times a Day for 5 days.    A little grey  area where her symptoms actually start respiratory symptoms yesterday  She is feeling a little better today  We probably can skip the Tamiflu however starts feeling worse over the next several hours go ahead and start the Tamiflu  Push fluids hydration treat fever and body aches any worsening chest pain shortness of breath urgent recheck               Patient Instructions   Discharge instructions    Push fluids  64 ounces a day  Ibuprofen 800  3 times a day for body aches fever  If starting Tamiflu start within the next couple hours  Chest pain high fever lethargy weakness emergency room

## 2020-02-10 ENCOUNTER — OFFICE VISIT (OUTPATIENT)
Dept: OBSTETRICS AND GYNECOLOGY | Facility: CLINIC | Age: 40
End: 2020-02-10

## 2020-02-10 VITALS
WEIGHT: 181 LBS | BODY MASS INDEX: 29.09 KG/M2 | HEIGHT: 66 IN | DIASTOLIC BLOOD PRESSURE: 70 MMHG | SYSTOLIC BLOOD PRESSURE: 102 MMHG

## 2020-02-10 DIAGNOSIS — R63.8 INCREASED BMI: Primary | ICD-10-CM

## 2020-02-10 PROCEDURE — WTLOSSFOLLUP: Performed by: OBSTETRICS & GYNECOLOGY

## 2020-02-10 RX ORDER — PHENTERMINE HYDROCHLORIDE 37.5 MG/1
37.5 TABLET ORAL
Qty: 30 TABLET | Refills: 0 | Status: SHIPPED | OUTPATIENT
Start: 2020-02-18 | End: 2020-03-16 | Stop reason: SDUPTHER

## 2020-02-10 NOTE — PROGRESS NOTES
Patient Care Team:  Epley, James, APRN as PCP - General (Family Medicine)  Marshall Monet MD as PCP - Family Medicine    Patient new to practice? No  Patient new to me? No    Chief Complaint:   Chief Complaint   Patient presents with   • Follow-up     weight loss       HPI: History taken from: patient            No LMP recorded. Patient has had an implant.           Pt denies any problems or side effects.    History reviewed. No pertinent past medical history.    Review of Systems   Constitutional: Negative.    HENT: Negative.    Eyes: Negative.    Respiratory: Negative.    Cardiovascular: Negative.    Gastrointestinal: Negative.    Endocrine: Negative.    Musculoskeletal: Negative.    Skin: Negative.    Allergic/Immunologic: Negative.    Neurological: Negative.    Hematological: Negative.    Psychiatric/Behavioral: Negative.          Current Outpatient Medications:   •  FLUoxetine (PROZAC) 20 MG capsule, Take 1 capsule by mouth Daily., Disp: 30 capsule, Rfl: 1  •  fluticasone (FLONASE) 50 MCG/ACT nasal spray, INSTILL TWO SPRAYS INTO EACH NOSTRIL DAILY, Disp: , Rfl: 0  •  levonorgestrel (MIRENA, 52 MG,) 20 MCG/24HR IUD, 1 each by Intrauterine route., Disp: , Rfl:   •  minocycline (MINOCIN,DYNACIN) 100 MG capsule, Take 1 capsule by mouth Daily. 10 to 14 days as needed for acne flare, Disp: 60 capsule, Rfl: 0  •  ondansetron (ZOFRAN) 4 MG tablet, Take 1 tablet by mouth Every 6 (Six) Hours As Needed for Nausea or Vomiting., Disp: 15 tablet, Rfl: 0  •  pantoprazole (PROTONIX) 20 MG EC tablet, Take 1 tablet by mouth Daily., Disp: 90 tablet, Rfl: 1  •  [START ON 2/18/2020] phentermine (ADIPEX-P) 37.5 MG tablet, Take 1 tablet by mouth Every Morning Before Breakfast for 28 days., Disp: 30 tablet, Rfl: 0  •  topiramate (TOPAMAX) 200 MG tablet, Take 1 tablet by mouth Daily., Disp: 90 tablet, Rfl: 1  •  traZODone (DESYREL) 50 MG tablet, Take 1 tablet by mouth Every Night. As needed for insomnia, Disp: 30 tablet, Rfl:  "3    Social History:  Smoker: No                                Alcohol: No                               Recreational drugs: No    Objective    Vital Signs  BP: (102)/(70) 102/70    Flowsheet Rows      First Filed Value   Admission Height  167.6 cm (65.98\") Documented at 02/10/2020 1208   Admission Weight  82.1 kg (181 lb) Documented at 02/10/2020 1208          Physical Exam: NOT DONE    Physical Exam   Constitutional: She is oriented to person, place, and time. She appears well-developed and well-nourished.   HENT:   Head: Normocephalic and atraumatic.   Eyes: EOM are normal.   Neck: Normal range of motion.   Pulmonary/Chest: Effort normal.   Abdominal: Soft.   Musculoskeletal: Normal range of motion.   Neurological: She is alert and oriented to person, place, and time.   Skin: Skin is warm and dry.   Psychiatric: She has a normal mood and affect. Her behavior is normal. Judgment and thought content normal.   Nursing note and vitals reviewed.          Lab Results (last 24 hours)     ** No results found for the last 24 hours. **          Imaging Results (Last 24 Hours)     ** No results found for the last 24 hours. **            ECG/EMG Results (most recent)     None          Medication Review:   I have reviewed the patient's current medication list    Current Outpatient Medications:   •  FLUoxetine (PROZAC) 20 MG capsule, Take 1 capsule by mouth Daily., Disp: 30 capsule, Rfl: 1  •  fluticasone (FLONASE) 50 MCG/ACT nasal spray, INSTILL TWO SPRAYS INTO EACH NOSTRIL DAILY, Disp: , Rfl: 0  •  levonorgestrel (MIRENA, 52 MG,) 20 MCG/24HR IUD, 1 each by Intrauterine route., Disp: , Rfl:   •  minocycline (MINOCIN,DYNACIN) 100 MG capsule, Take 1 capsule by mouth Daily. 10 to 14 days as needed for acne flare, Disp: 60 capsule, Rfl: 0  •  ondansetron (ZOFRAN) 4 MG tablet, Take 1 tablet by mouth Every 6 (Six) Hours As Needed for Nausea or Vomiting., Disp: 15 tablet, Rfl: 0  •  pantoprazole (PROTONIX) 20 MG EC tablet, Take 1 " tablet by mouth Daily., Disp: 90 tablet, Rfl: 1  •  [START ON 2/18/2020] phentermine (ADIPEX-P) 37.5 MG tablet, Take 1 tablet by mouth Every Morning Before Breakfast for 28 days., Disp: 30 tablet, Rfl: 0  •  topiramate (TOPAMAX) 200 MG tablet, Take 1 tablet by mouth Daily., Disp: 90 tablet, Rfl: 1  •  traZODone (DESYREL) 50 MG tablet, Take 1 tablet by mouth Every Night. As needed for insomnia, Disp: 30 tablet, Rfl: 3    IMPRESSION: INCREASED BMI ON WT LOSS PROGRAM INCLUDING PHENTERMINE    Plan for disposition:  REFILL PHENTERMINE, RTO 1 MONTH.  INSTRUCTIONS, PRECAUTIONS AND WARNINGS REVIEWED.        Napoleon Oconnell MD    02/10/20  1:03 PM

## 2020-02-24 RX ORDER — FLUOXETINE HYDROCHLORIDE 20 MG/1
CAPSULE ORAL
Qty: 30 CAPSULE | Refills: 1 | Status: SHIPPED | OUTPATIENT
Start: 2020-02-24 | End: 2020-04-23

## 2020-03-16 ENCOUNTER — OFFICE VISIT (OUTPATIENT)
Dept: OBSTETRICS AND GYNECOLOGY | Facility: CLINIC | Age: 40
End: 2020-03-16

## 2020-03-16 VITALS
DIASTOLIC BLOOD PRESSURE: 80 MMHG | HEIGHT: 66 IN | SYSTOLIC BLOOD PRESSURE: 112 MMHG | BODY MASS INDEX: 28.45 KG/M2 | WEIGHT: 177 LBS

## 2020-03-16 DIAGNOSIS — R63.8 INCREASED BMI: Primary | ICD-10-CM

## 2020-03-16 PROCEDURE — WTLOSSFOLLUP: Performed by: OBSTETRICS & GYNECOLOGY

## 2020-03-16 RX ORDER — PHENTERMINE HYDROCHLORIDE 37.5 MG/1
37.5 TABLET ORAL
Qty: 30 TABLET | Refills: 0 | Status: SHIPPED | OUTPATIENT
Start: 2020-03-16 | End: 2020-04-03

## 2020-03-16 RX ORDER — FLUCONAZOLE 200 MG/1
200 TABLET ORAL ONCE
Qty: 1 TABLET | Refills: 2 | Status: SHIPPED | OUTPATIENT
Start: 2020-03-16 | End: 2020-05-18

## 2020-03-16 NOTE — PROGRESS NOTES
Patient Care Team:  Epley, James, APRN as PCP - General (Family Medicine)  Marshall Monet MD as PCP - Family Medicine    Patient new to practice? No  Patient new to me? No    Chief Complaint:   Chief Complaint   Patient presents with   • Follow-up     Weight Mgmt       HPI: History taken from: patient            No LMP recorded. Patient has had an implant.           Pt denies any problems or side effects.    History reviewed. No pertinent past medical history.    Review of Systems   Constitutional: Negative.    HENT: Negative.    Eyes: Negative.    Respiratory: Negative.    Cardiovascular: Negative.    Gastrointestinal: Negative.    Endocrine: Negative.    Musculoskeletal: Negative.    Skin: Negative.    Allergic/Immunologic: Negative.    Neurological: Negative.    Hematological: Negative.    Psychiatric/Behavioral: Negative.          Current Outpatient Medications:   •  FLUoxetine (PROzac) 20 MG capsule, TAKE ONE CAPSULE BY MOUTH DAILY, Disp: 30 capsule, Rfl: 1  •  fluticasone (FLONASE) 50 MCG/ACT nasal spray, INSTILL TWO SPRAYS INTO EACH NOSTRIL DAILY, Disp: , Rfl: 0  •  levonorgestrel (MIRENA, 52 MG,) 20 MCG/24HR IUD, 1 each by Intrauterine route., Disp: , Rfl:   •  minocycline (MINOCIN,DYNACIN) 100 MG capsule, Take 1 capsule by mouth Daily. 10 to 14 days as needed for acne flare, Disp: 60 capsule, Rfl: 0  •  ondansetron (ZOFRAN) 4 MG tablet, Take 1 tablet by mouth Every 6 (Six) Hours As Needed for Nausea or Vomiting., Disp: 15 tablet, Rfl: 0  •  pantoprazole (PROTONIX) 20 MG EC tablet, Take 1 tablet by mouth Daily., Disp: 90 tablet, Rfl: 1  •  phentermine (ADIPEX-P) 37.5 MG tablet, Take 1 tablet by mouth Every Morning Before Breakfast for 28 days., Disp: 30 tablet, Rfl: 0  •  topiramate (TOPAMAX) 200 MG tablet, Take 1 tablet by mouth Daily., Disp: 90 tablet, Rfl: 1  •  traZODone (DESYREL) 50 MG tablet, Take 1 tablet by mouth Every Night. As needed for insomnia, Disp: 30 tablet, Rfl: 3    Social History:   "Smoker: No                                Alcohol: Yes                               Recreational drugs: No    Objective    Vital Signs  BP: (112)/(80) 112/80    Flowsheet Rows      First Filed Value   Admission Height  167.6 cm (65.98\") Documented at 03/16/2020 1400   Admission Weight  80.3 kg (177 lb) Documented at 03/16/2020 1400          Physical Exam: NOT DONE    Physical Exam   Constitutional: She is oriented to person, place, and time. She appears well-developed and well-nourished.   HENT:   Head: Normocephalic and atraumatic.   Eyes: EOM are normal.   Neck: Normal range of motion.   Pulmonary/Chest: Effort normal.   Abdominal: Soft.   Musculoskeletal: Normal range of motion.   Neurological: She is alert and oriented to person, place, and time.   Skin: Skin is warm and dry.   Psychiatric: She has a normal mood and affect. Her behavior is normal. Judgment and thought content normal.   Nursing note and vitals reviewed.          Lab Results (last 24 hours)     ** No results found for the last 24 hours. **          Imaging Results (Last 24 Hours)     ** No results found for the last 24 hours. **            ECG/EMG Results (most recent)     None          Medication Review:   I have reviewed the patient's current medication list    Current Outpatient Medications:   •  FLUoxetine (PROzac) 20 MG capsule, TAKE ONE CAPSULE BY MOUTH DAILY, Disp: 30 capsule, Rfl: 1  •  fluticasone (FLONASE) 50 MCG/ACT nasal spray, INSTILL TWO SPRAYS INTO EACH NOSTRIL DAILY, Disp: , Rfl: 0  •  levonorgestrel (MIRENA, 52 MG,) 20 MCG/24HR IUD, 1 each by Intrauterine route., Disp: , Rfl:   •  minocycline (MINOCIN,DYNACIN) 100 MG capsule, Take 1 capsule by mouth Daily. 10 to 14 days as needed for acne flare, Disp: 60 capsule, Rfl: 0  •  ondansetron (ZOFRAN) 4 MG tablet, Take 1 tablet by mouth Every 6 (Six) Hours As Needed for Nausea or Vomiting., Disp: 15 tablet, Rfl: 0  •  pantoprazole (PROTONIX) 20 MG EC tablet, Take 1 tablet by mouth " Daily., Disp: 90 tablet, Rfl: 1  •  phentermine (ADIPEX-P) 37.5 MG tablet, Take 1 tablet by mouth Every Morning Before Breakfast for 28 days., Disp: 30 tablet, Rfl: 0  •  topiramate (TOPAMAX) 200 MG tablet, Take 1 tablet by mouth Daily., Disp: 90 tablet, Rfl: 1  •  traZODone (DESYREL) 50 MG tablet, Take 1 tablet by mouth Every Night. As needed for insomnia, Disp: 30 tablet, Rfl: 3    IMPRESSION: INCREASED BMI ON WT LOSS PROGRAM INCLUDING PHENTERMINE    Plan for disposition:  REFILL PHENTERMINE, RTO 1 MONTH.  INSTRUCTIONS, PRECAUTIONS AND WARNINGS REVIEWED.        Napoleon Oconnell MD    03/16/20  14:05

## 2020-03-20 ENCOUNTER — TELEPHONE (OUTPATIENT)
Dept: FAMILY MEDICINE CLINIC | Facility: CLINIC | Age: 40
End: 2020-03-20

## 2020-03-20 ENCOUNTER — NURSE TRIAGE (OUTPATIENT)
Dept: CALL CENTER | Facility: HOSPITAL | Age: 40
End: 2020-03-20

## 2020-03-20 NOTE — TELEPHONE ENCOUNTER
Patient has no known direct contact anyone diagnosed with covid she was around a ball several days ago for a meeting  He had no direct contact with anyone he is not sick  Couple weeks ago  Somebody at his [employer] was positive but he had no direct contact but was in the same large room    She has cough congestion low-grade temperature less than 100 no chest pain no shortness of breath symptoms approximately 3 days ago  With cough a little bit of pain in her upper back no severe symptoms no increased myalgias severe headache weakness lethargy    Taken some Tylenol helps some    She should push fluids plenty of rest  Stay on top of fever use Tylenol first and only ibuprofen if fever uncontrolled  She can take up to 1000 g of Tylenol every 4 hours up to 3 g and ibuprofen 800 3 times daily if needed    Any chest pain increased shortness of breath high uncontrolled fever lethargy mask up appropriately and seek immediate treatment urgent care or ER  She will call the [facility] 800-number to see if she is eligible for testing  She will call now    Daughter has mild fever as well without chest pain shortness of breath or severe symptoms both should treat fever and push fluids  She will call her daughter's pediatrician for advice on her daughter's illness    Quarantine 14 days minimum  If found to contact SARS 2  Likely would be much longer    Quarantine includes other family members  Including her son who is staying with the father

## 2020-03-20 NOTE — TELEPHONE ENCOUNTER
----- Message from Montserrat Jennings MA sent at 3/20/2020 11:04 AM EDT -----  Regarding: FW: Visit Follow-Up Question  Contact: [phone]  Would you like me to consult the patient to do EVisit if she is on mychart?    ----- Message -----  From: Dianna Clay  Sent: 3/20/2020  10:14 AM EDT  To: [facility]  Subject: Visit Follow-Up Question                         I spoke with nurse on the phone today concerning my symptoms because I was in contact with my boss who was in contact with confirmed Covid-19 case on 3/8. My last contact with my boss was on 3/13. Myself and my daughter have not been feeling well both having dry cough and myself having some chest pain and back pain when breathing but nothing incredibly uncomfortable. She wanted me to reach out to you for further instructions.

## 2020-03-20 NOTE — TELEPHONE ENCOUNTER
"    Reason for Disposition  • [1] Cough occurs AND [2] within 14 days of COVID-19 EXPOSURE    Additional Information  • Negative: Severe difficulty breathing (e.g., struggling for each breath, speak in single words, bluish lips)  • Negative: Sounds like a life-threatening emergency to the triager  • Negative: [1] Difficulty breathing (shortness of breath) occurs AND [2] onset > 14 days after COVID-19 EXPOSURE (Close Contact)  • Negative: [1] Dry cough occurs AND [2] onset > 14 days after COVID-19 EXPOSURE  • Negative: [1] Wet cough (i.e., white-yellow, yellow, green, or stefan colored sputum) AND [2] onset > 14 days after COVID-19 EXPOSURE  • Negative: [1] Common cold symptoms AND [2] onset > 14 days after COVID-19 EXPOSURE  • Negative: [1] Difficulty breathing occurs AND [2] within 14 days of COVID-19 EXPOSURE  • Negative: Patient sounds very sick or weak to the triager  • Negative: [1] Fever or feeling feverish AND [2] within 14 Days of COVID-19 EXPOSURE    Answer Assessment - Initial Assessment Questions  1. PLACE of CONTACT: \"Where were you when you were exposed to COVID-19  (coronavirus disease 2019)?\" (e.g., city, state, country)     Pt is employed at [employer] in [place], [place].She goes out and visits with customers plus she works with about 30 people in the office. Pt's boss informed her he was in the same building as a person with a confirmed case of COVID-19 on 3/8/20. Boss returned to work on 3/9/20. Pt had last known contact with her boss on 3/13/20.    2. TYPE of CONTACT: \"How much contact was there?\" (e.g., live in same house, work in same office, same school)      She works in an office and goes into visit customers    3. DATE of CONTACT: \"When did you have contact with a coronavirus patient?\" (e.g., days)      She has been in close contact with the public    4. DURATION of CONTACT: \"How long were you in contact with the COVID-19 (coronavirus disease) patient?\" (e.g., a few seconds, passed by " "person, a few minutes, live with the patient)      She works about 8 hours per day M-F. The last day she was at work was 3/13/20.    5. SYMPTOMS: \"Do you have any symptoms?\" (e.g., fever, cough, breathing difficulty)      Pt is afebrile at time of call. She denies feeling feverish. Pt has a dry cough that started about 2 days ago. She denies breathing difficulties. Other symptoms include pain in the upper part of her back and chest. The chest pain is located in the middle of her chest. Nothing makes the chest pain better or worse.     6. PREGNANCY OR POSTPARTUM: \"Is there any chance you are pregnant?\" \"When was your last menstrual period?\" \"Did you deliver in the last 2 weeks?\"      Pt is not pregnant or postpartum. Last menstrual period was about a couple of weeks ago. Pt has an IUD.    7. HIGH RISK: \"Do you have any heart or lung problems? Do you have a weakened immune system?\" (e.g., CHF, COPD, asthma, HIV positive, chemotherapy, renal failure, diabetes mellitus, sickle cell anemia)      Pt denies problems with her heart and lungs. She doesn't have a weakened immune system.    Protocols used: CORONAVIRUS (COVID-19) EXPOSURE-ADULT-AH      "

## 2020-04-03 DIAGNOSIS — R63.8 INCREASED BMI: ICD-10-CM

## 2020-04-06 RX ORDER — PHENTERMINE HYDROCHLORIDE 37.5 MG/1
37.5 TABLET ORAL
Qty: 30 TABLET | Refills: 0 | Status: SHIPPED | OUTPATIENT
Start: 2020-04-06 | End: 2020-05-04

## 2020-04-15 RX ORDER — TRAZODONE HYDROCHLORIDE 50 MG/1
TABLET ORAL
Qty: 30 TABLET | Refills: 3 | Status: SHIPPED | OUTPATIENT
Start: 2020-04-15 | End: 2020-12-16

## 2020-04-23 RX ORDER — FLUOXETINE HYDROCHLORIDE 20 MG/1
CAPSULE ORAL
Qty: 30 CAPSULE | Refills: 2 | Status: SHIPPED | OUTPATIENT
Start: 2020-04-23 | End: 2020-07-23

## 2020-05-13 RX ORDER — FLUCONAZOLE 100 MG/1
100 TABLET ORAL DAILY
Qty: 1 TABLET | Refills: 0 | Status: SHIPPED | OUTPATIENT
Start: 2020-05-13 | End: 2021-04-27

## 2020-05-18 RX ORDER — FLUCONAZOLE 200 MG/1
TABLET ORAL
Qty: 1 TABLET | Refills: 2 | Status: SHIPPED | OUTPATIENT
Start: 2020-05-18 | End: 2021-04-27

## 2020-07-02 DIAGNOSIS — K21.9 GASTROESOPHAGEAL REFLUX DISEASE WITHOUT ESOPHAGITIS: ICD-10-CM

## 2020-07-02 RX ORDER — PANTOPRAZOLE SODIUM 20 MG/1
TABLET, DELAYED RELEASE ORAL
Qty: 90 TABLET | Refills: 1 | Status: SHIPPED | OUTPATIENT
Start: 2020-07-02 | End: 2022-02-24

## 2020-07-23 ENCOUNTER — TELEMEDICINE (OUTPATIENT)
Dept: FAMILY MEDICINE CLINIC | Facility: CLINIC | Age: 40
End: 2020-07-23

## 2020-07-23 DIAGNOSIS — F41.9 ANXIETY: Primary | ICD-10-CM

## 2020-07-23 PROCEDURE — 99213 OFFICE O/P EST LOW 20 MIN: CPT | Performed by: NURSE PRACTITIONER

## 2020-07-23 RX ORDER — MINOCYCLINE HYDROCHLORIDE 100 MG/1
100 CAPSULE ORAL DAILY
Qty: 60 CAPSULE | Refills: 0 | Status: SHIPPED | OUTPATIENT
Start: 2020-07-23 | End: 2021-05-26 | Stop reason: SDUPTHER

## 2020-07-23 RX ORDER — BUPROPION HYDROCHLORIDE 150 MG/1
150 TABLET ORAL DAILY
Qty: 30 TABLET | Refills: 2 | Status: SHIPPED | OUTPATIENT
Start: 2020-07-23 | End: 2020-10-28

## 2020-07-23 RX ORDER — FLUOXETINE HYDROCHLORIDE 40 MG/1
40 CAPSULE ORAL DAILY
Qty: 30 CAPSULE | Refills: 2 | Status: SHIPPED | OUTPATIENT
Start: 2020-07-23 | End: 2020-10-28

## 2020-07-23 NOTE — PROGRESS NOTES
Subjective   Dianna Clay is a 40 y.o. female.     Follow-up anxiety depression patient has been taking fluoxetine 20 mg also bupropion 150 mg she sees a weight loss physician and takes a moderate dose of Senterra name she has had no increased anxiety with phentermine  She is had no problems previously with this no change in the medication she is try and improve her diet    She is  presently several years  Since her ex- is quite difficult, kids are having difficulty in the relationship with him,  Both are having issues and is causing mother stress  As well as with the increased stress of the pandemic quarantine  Increased anxiety some sleeping issues restlessness she would like to increase her medication  No suicidal ideation no agitation  No severe history of any psychiatric illness or strong family history    No increased alcohol abuse or drugs    Needs a refill of minocycline she uses as needed for acne presently controlled mild to moderate acne         There were no vitals taken for this visit.      The following portions of the patient's history were reviewed and updated as appropriate: allergies, current medications, past family history, past medical history, past social history, past surgical history and problem list.    Review of Systems   Constitutional: Negative for chills, fatigue, fever and unexpected weight loss.   HENT: Negative.  Negative for trouble swallowing.    Eyes: Negative.    Respiratory: Negative for cough and shortness of breath.    Cardiovascular: Negative for chest pain, palpitations and leg swelling.   Gastrointestinal: Negative for abdominal pain and blood in stool.   Genitourinary: Negative.  Negative for pelvic pain.   Musculoskeletal: Negative.    Skin: Negative.    Neurological: Negative.  Negative for dizziness, speech difficulty, weakness and confusion.   Psychiatric/Behavioral: Positive for decreased concentration. The patient is nervous/anxious.         Objective   Physical Exam   Constitutional: She is oriented to person, place, and time. She appears well-developed and well-nourished. No distress.   HENT:   Head: Normocephalic.   Eyes: Pupils are equal, round, and reactive to light. Conjunctivae are normal.   Neck: Neck supple.   Cardiovascular: Exam reveals no friction rub.   No murmur heard.  Pulmonary/Chest: Effort normal. No respiratory distress. She has no wheezes.   Musculoskeletal: She exhibits no edema.   Neurological: She is alert and oriented to person, place, and time.   Skin: Skin is warm and dry.   Psychiatric: She has a normal mood and affect. Her behavior is normal. Judgment and thought content normal.   Pleasant appropriate   Vitals reviewed.        Assessment/Plan   Diagnoses and all orders for this visit:    Anxiety    Other orders  -     minocycline (MINOCIN,DYNACIN) 100 MG capsule; Take 1 capsule by mouth Daily. 10 to 14 days as needed for acne flare  -     buPROPion XL (Wellbutrin XL) 150 MG 24 hr tablet; Take 1 tablet by mouth Daily.  -     FLUoxetine (PROzac) 40 MG capsule; Take 1 capsule by mouth Daily.        Increase fluoxetine to 40 mg  Relaxation meditation  Good communication with family recommend counseling  Exercise daily  Recheck ASAP if increased anxiety if severe anxiety suicidal ideation emergency room  Otherwise follow-up by email  6 weeks  As long she is doing well she will come back for full visit in 6 months    Minocycline risk-benefit risk of agranulocytosis as well as sun reactions and liver potential problems always lowest effective dose shortest time possible    Office visit 20-minute grade 50% counseling  Telehealth video visit patient consent          There are no Patient Instructions on file for this visit.

## 2020-07-27 ENCOUNTER — OFFICE VISIT (OUTPATIENT)
Dept: OBSTETRICS AND GYNECOLOGY | Facility: CLINIC | Age: 40
End: 2020-07-27

## 2020-07-27 VITALS
WEIGHT: 179 LBS | HEIGHT: 66 IN | BODY MASS INDEX: 28.77 KG/M2 | DIASTOLIC BLOOD PRESSURE: 76 MMHG | SYSTOLIC BLOOD PRESSURE: 108 MMHG

## 2020-07-27 DIAGNOSIS — R63.8 INCREASED BMI: ICD-10-CM

## 2020-07-27 DIAGNOSIS — Z13.9 SCREENING FOR UNSPECIFIED CONDITION: Primary | ICD-10-CM

## 2020-07-27 PROCEDURE — WTLOSSFOLLUP: Performed by: OBSTETRICS & GYNECOLOGY

## 2020-07-27 RX ORDER — PHENTERMINE HYDROCHLORIDE 37.5 MG/1
37.5 TABLET ORAL
Qty: 30 TABLET | Refills: 0 | Status: SHIPPED | OUTPATIENT
Start: 2020-07-27 | End: 2021-04-27

## 2020-07-27 NOTE — PROGRESS NOTES
Patient Care Team:  Epley, James, APRN as PCP - General (Family Medicine)  Marshall Monet MD as PCP - Family Medicine    Patient new to practice? No  Patient new to me? No    Chief Complaint:   Chief Complaint   Patient presents with   • Other     weight loss       HPI: History taken from: patient            No LMP recorded. Patient has had an implant.           Pt denies any problems or side effects.    History reviewed. No pertinent past medical history.    Review of Systems   Constitutional: Positive for unexpected weight change.   HENT: Negative.    Eyes: Negative.    Respiratory: Negative.    Cardiovascular: Negative.    Gastrointestinal: Negative.    Endocrine: Negative.    Musculoskeletal: Negative.    Skin: Negative.    Allergic/Immunologic: Negative.    Neurological: Negative.    Hematological: Negative.    Psychiatric/Behavioral: Negative.          Current Outpatient Medications:   •  buPROPion XL (Wellbutrin XL) 150 MG 24 hr tablet, Take 1 tablet by mouth Daily., Disp: 30 tablet, Rfl: 2  •  fluconazole (Diflucan) 100 MG tablet, Take 1 tablet by mouth Daily., Disp: 1 tablet, Rfl: 0  •  fluconazole (DIFLUCAN) 200 MG tablet, TAKE ONE TABLET BY MOUTH FOR ONE TIME DOSE, MAY REPEAT IN 3 DAYS, Disp: 1 tablet, Rfl: 2  •  FLUoxetine (PROzac) 40 MG capsule, Take 1 capsule by mouth Daily., Disp: 30 capsule, Rfl: 2  •  fluticasone (FLONASE) 50 MCG/ACT nasal spray, INSTILL TWO SPRAYS INTO EACH NOSTRIL DAILY, Disp: , Rfl: 0  •  levonorgestrel (MIRENA, 52 MG,) 20 MCG/24HR IUD, 1 each by Intrauterine route., Disp: , Rfl:   •  minocycline (MINOCIN,DYNACIN) 100 MG capsule, Take 1 capsule by mouth Daily. 10 to 14 days as needed for acne flare, Disp: 60 capsule, Rfl: 0  •  ondansetron (ZOFRAN) 4 MG tablet, Take 1 tablet by mouth Every 6 (Six) Hours As Needed for Nausea or Vomiting., Disp: 15 tablet, Rfl: 0  •  pantoprazole (PROTONIX) 20 MG EC tablet, TAKE ONE TABLET BY MOUTH DAILY, Disp: 90 tablet, Rfl: 1  •  phentermine  "(ADIPEX-P) 37.5 MG tablet, Take 1 tablet by mouth Every Morning Before Breakfast., Disp: 30 tablet, Rfl: 0  •  topiramate (TOPAMAX) 200 MG tablet, TAKE ONE TABLET BY MOUTH DAILY, Disp: 90 tablet, Rfl: 1  •  traZODone (DESYREL) 50 MG tablet, TAKE ONE TABLET BY MOUTH NIGHTLY AS NEEDED FOR INSOMNIA, Disp: 30 tablet, Rfl: 3    Social History:  Smoker: No                                Alcohol: No                               Recreational drugs: No    Objective    Vital Signs  BP: (108)/(76) 108/76    Flowsheet Rows      First Filed Value   Admission Height  167.6 cm (66\") Documented at 07/27/2020 1611   Admission Weight  --          Physical Exam: /76   Ht 167.6 cm (66\")   Wt 81.2 kg (179 lb)   BMI 28.89 kg/m²     Physical Exam   Constitutional: She is oriented to person, place, and time. She appears well-developed and well-nourished.   HENT:   Head: Normocephalic and atraumatic.   Eyes: EOM are normal.   Neck: Normal range of motion.   Pulmonary/Chest: Effort normal.   Abdominal: Soft.   Musculoskeletal: Normal range of motion.   Neurological: She is alert and oriented to person, place, and time.   Skin: Skin is warm and dry.   Psychiatric: She has a normal mood and affect. Her behavior is normal. Judgment and thought content normal.   Nursing note and vitals reviewed.          Lab Results (last 24 hours)     ** No results found for the last 24 hours. **          Imaging Results (Last 24 Hours)     ** No results found for the last 24 hours. **            ECG/EMG Results (most recent)     None          Medication Review:   I have reviewed the patient's current medication list    Current Outpatient Medications:   •  buPROPion XL (Wellbutrin XL) 150 MG 24 hr tablet, Take 1 tablet by mouth Daily., Disp: 30 tablet, Rfl: 2  •  fluconazole (Diflucan) 100 MG tablet, Take 1 tablet by mouth Daily., Disp: 1 tablet, Rfl: 0  •  fluconazole (DIFLUCAN) 200 MG tablet, TAKE ONE TABLET BY MOUTH FOR ONE TIME DOSE, MAY REPEAT IN " 3 DAYS, Disp: 1 tablet, Rfl: 2  •  FLUoxetine (PROzac) 40 MG capsule, Take 1 capsule by mouth Daily., Disp: 30 capsule, Rfl: 2  •  fluticasone (FLONASE) 50 MCG/ACT nasal spray, INSTILL TWO SPRAYS INTO EACH NOSTRIL DAILY, Disp: , Rfl: 0  •  levonorgestrel (MIRENA, 52 MG,) 20 MCG/24HR IUD, 1 each by Intrauterine route., Disp: , Rfl:   •  minocycline (MINOCIN,DYNACIN) 100 MG capsule, Take 1 capsule by mouth Daily. 10 to 14 days as needed for acne flare, Disp: 60 capsule, Rfl: 0  •  ondansetron (ZOFRAN) 4 MG tablet, Take 1 tablet by mouth Every 6 (Six) Hours As Needed for Nausea or Vomiting., Disp: 15 tablet, Rfl: 0  •  pantoprazole (PROTONIX) 20 MG EC tablet, TAKE ONE TABLET BY MOUTH DAILY, Disp: 90 tablet, Rfl: 1  •  phentermine (ADIPEX-P) 37.5 MG tablet, Take 1 tablet by mouth Every Morning Before Breakfast., Disp: 30 tablet, Rfl: 0  •  topiramate (TOPAMAX) 200 MG tablet, TAKE ONE TABLET BY MOUTH DAILY, Disp: 90 tablet, Rfl: 1  •  traZODone (DESYREL) 50 MG tablet, TAKE ONE TABLET BY MOUTH NIGHTLY AS NEEDED FOR INSOMNIA, Disp: 30 tablet, Rfl: 3    IMPRESSION: INCREASED BMI ON WT LOSS PROGRAM INCLUDING PHENTERMINE    Plan for disposition:  REFILL PHENTERMINE, RTO 1 MONTH.  INSTRUCTIONS, PRECAUTIONS AND WARNINGS REVIEWED.        Napoleon Oconnell MD    07/27/20  19:12

## 2020-08-03 RX ORDER — TRAZODONE HYDROCHLORIDE 50 MG/1
TABLET ORAL
Qty: 30 TABLET | Refills: 0 | OUTPATIENT
Start: 2020-08-03

## 2020-10-28 RX ORDER — FLUOXETINE HYDROCHLORIDE 40 MG/1
40 CAPSULE ORAL DAILY
Qty: 90 CAPSULE | Refills: 0 | Status: SHIPPED | OUTPATIENT
Start: 2020-10-28 | End: 2021-04-27

## 2020-10-28 RX ORDER — BUPROPION HYDROCHLORIDE 150 MG/1
150 TABLET ORAL DAILY
Qty: 90 TABLET | Refills: 0 | Status: SHIPPED | OUTPATIENT
Start: 2020-10-28 | End: 2021-02-12

## 2020-12-16 RX ORDER — TRAZODONE HYDROCHLORIDE 50 MG/1
TABLET ORAL
Qty: 30 TABLET | Refills: 3 | Status: SHIPPED | OUTPATIENT
Start: 2020-12-16 | End: 2021-02-12

## 2021-02-12 ENCOUNTER — TELEMEDICINE (OUTPATIENT)
Dept: FAMILY MEDICINE CLINIC | Facility: CLINIC | Age: 41
End: 2021-02-12

## 2021-02-12 DIAGNOSIS — F41.8 MIXED ANXIETY DEPRESSIVE DISORDER: Primary | ICD-10-CM

## 2021-02-12 DIAGNOSIS — G43.009 MIGRAINE WITHOUT AURA AND WITHOUT STATUS MIGRAINOSUS, NOT INTRACTABLE: ICD-10-CM

## 2021-02-12 DIAGNOSIS — Z00.00 HEALTH MAINTENANCE EXAMINATION: ICD-10-CM

## 2021-02-12 DIAGNOSIS — Z79.899 HIGH RISK MEDICATION USE: ICD-10-CM

## 2021-02-12 PROCEDURE — 99213 OFFICE O/P EST LOW 20 MIN: CPT | Performed by: NURSE PRACTITIONER

## 2021-02-12 RX ORDER — BUPROPION HYDROCHLORIDE 150 MG/1
150 TABLET ORAL DAILY
Qty: 90 TABLET | Refills: 1 | Status: SHIPPED | OUTPATIENT
Start: 2021-02-12 | End: 2021-03-03

## 2021-02-12 RX ORDER — TRAZODONE HYDROCHLORIDE 50 MG/1
TABLET ORAL
Qty: 60 TABLET | Refills: 3 | Status: SHIPPED | OUTPATIENT
Start: 2021-02-12 | End: 2021-04-09

## 2021-02-12 RX ORDER — FLUOXETINE HYDROCHLORIDE 20 MG/1
60 CAPSULE ORAL DAILY
Qty: 90 CAPSULE | Refills: 5 | Status: SHIPPED | OUTPATIENT
Start: 2021-02-12 | End: 2021-04-27

## 2021-02-12 NOTE — PROGRESS NOTES
Chief Complaint  No chief complaint on file.    Subjective          Dianna Clay presents to [facility]  Patient complains of increased anxiety depression over the last several months  Attributes it to the increased stress and worry much related to her daughter she has quite a bit of depression        Her daughter has been in and out of psychiatry since September  History of cutting,  She has had a total of 3 hospitalizations  Patient is upset because, her daughters dad keeps taking her out early from the hospital  And she does not think she is ready  Court last week 14 yo  [employer]   Engaged sept  New job    No motivation  Wt gain   Stop working out    No suicide ideation  We discussed treatment options         Objective   Vital Signs:   There were no vitals taken for this visit.    Physical Exam   Result Review :                 Assessment and Plan    Diagnoses and all orders for this visit:    1. Mixed anxiety depressive disorder (Primary)  -     CBC & Differential; Future  -     TSH Rfx On Abnormal To Free T4; Future  -     Urinalysis With Microscopic If Indicated (No Culture) - Urine, Clean Catch; Future  -     Lipid Panel With LDL / HDL Ratio; Future  -     Comprehensive Metabolic Panel; Future    2. Migraine without aura and without status migrainosus, not intractable  -     CBC & Differential; Future  -     TSH Rfx On Abnormal To Free T4; Future  -     Urinalysis With Microscopic If Indicated (No Culture) - Urine, Clean Catch; Future  -     Lipid Panel With LDL / HDL Ratio; Future  -     Comprehensive Metabolic Panel; Future    3. High risk medication use  -     CBC & Differential; Future  -     TSH Rfx On Abnormal To Free T4; Future  -     Urinalysis With Microscopic If Indicated (No Culture) - Urine, Clean Catch; Future  -     Lipid Panel With LDL / HDL Ratio; Future  -     Comprehensive Metabolic Panel; Future    4. Health maintenance examination  -     CBC  & Differential; Future  -     TSH Rfx On Abnormal To Free T4; Future  -     Urinalysis With Microscopic If Indicated (No Culture) - Urine, Clean Catch; Future  -     Lipid Panel With LDL / HDL Ratio; Future  -     Comprehensive Metabolic Panel; Future    Other orders  -     FLUoxetine (PROzac) 20 MG capsule; Take 3 capsules by mouth Daily.  Dispense: 90 capsule; Refill: 5  -     traZODone (DESYREL) 50 MG tablet; 1 to 2 tablets by mouth at bedtime as needed for insomnia  Dispense: 60 tablet; Refill: 3  -     Discontinue: buPROPion XL (WELLBUTRIN XL) 150 MG 24 hr tablet; Take 1 tablet by mouth Daily.  Dispense: 90 tablet; Refill: 1        Follow Up   No follow-ups on file.  Patient was given instructions and counseling regarding her condition or for health maintenance advice. Please see specific information pulled into the AVS if appropriate.     Patient has situational increased depression and anxiety  Relaxation deep breathing good sleep  Good communication with her daughter but also  As best as she can keep things simple as she can and keep communication with her daughters father as well    Increase fluoxetine to 60 mg daily appropriate on risk-benefit start she has no contraindications no seizure disorder  Consider counseling recommend for herself as well  Any severe symptoms abnormal thinking suicide ideation emergency room otherwise follow-up in 6 weeks  Video visit [employer] 20-minute with patient permission greater than 50% counseling

## 2021-03-03 ENCOUNTER — OFFICE VISIT (OUTPATIENT)
Dept: FAMILY MEDICINE CLINIC | Facility: CLINIC | Age: 41
End: 2021-03-03

## 2021-03-03 VITALS
HEART RATE: 80 BPM | TEMPERATURE: 98 F | BODY MASS INDEX: 31.98 KG/M2 | SYSTOLIC BLOOD PRESSURE: 110 MMHG | WEIGHT: 199 LBS | HEIGHT: 66 IN | DIASTOLIC BLOOD PRESSURE: 70 MMHG | OXYGEN SATURATION: 98 %

## 2021-03-03 DIAGNOSIS — G44.209 TENSION HEADACHE: Primary | ICD-10-CM

## 2021-03-03 DIAGNOSIS — F32.1 MODERATE MAJOR DEPRESSION (HCC): ICD-10-CM

## 2021-03-03 PROCEDURE — 99214 OFFICE O/P EST MOD 30 MIN: CPT | Performed by: NURSE PRACTITIONER

## 2021-03-03 RX ORDER — BUPROPION HYDROCHLORIDE 300 MG/1
300 TABLET ORAL DAILY
Qty: 30 TABLET | Refills: 2 | Status: SHIPPED | OUTPATIENT
Start: 2021-03-03 | End: 2021-04-27 | Stop reason: SDUPTHER

## 2021-03-03 RX ORDER — BUTALBITAL, ACETAMINOPHEN AND CAFFEINE 300; 40; 50 MG/1; MG/1; MG/1
1 CAPSULE ORAL EVERY 4 HOURS PRN
Qty: 12 CAPSULE | Refills: 0 | Status: SHIPPED | OUTPATIENT
Start: 2021-03-03 | End: 2021-04-27

## 2021-03-03 NOTE — PATIENT INSTRUCTIONS
Discharge instructions    Relaxation meditation at least 5 minutes twice a day  To break this chain of tension headache    As soon as your tension headache comes on, stop your present activity if possible go lay down relax and meditate take Fioricet if needed  Or 800 ibuprofen  You can take occasionally if needed      Worst headache ever severe persistent headache fever emergency room  Gait difficulties confusion paresthesias etc.    If your headaches are not improving over the next 2 to 3 weeks return to office as well as we will evaluate you with an MRI    1200 marixa a day   Weight watchers  64 ounces water daily  Consider temporarily as a transition preprepared meals  Intermittent fasting    If appetite is a problem, consider  A tablespoon for orange flavor name brand Metamucil large glass of water before dinner  Research shows this may decreased appetite and weight loss      Follow-up 3 weeks

## 2021-03-03 NOTE — PROGRESS NOTES
Patient complains of headache over the last month constant pressure frontal bilateral parietal, increases throughout the midday  Fairly frequent she does have periods of relief she has no associated symptoms no nausea vomiting diaphoresis vision loss she has no fever chills  Quite a bit of stress, her daughter is having emotional problems, getting in trouble and ongoing school flunking out of school and she is having difficulties with cooperation with her ex- and daughter's father, she does not feel like he is doing his part.  Increasing the fluoxetine and bupropion is helped quite a bit emotionally she is working from home presently.    No drug or alcohol abuse no tobacco abuse.  She has a history of migraines and headaches feel differently less severe but nonetheless frequent      She is picked up 22 pounds over the last year she has decreased and poor body image does not want to go well feels frustrated and depressed over her gaining weight and her situation.  Phentermine is helped her in the past to get on top of her symptoms she is wondering if we could consider this today.

## 2021-03-14 NOTE — PROGRESS NOTES
"Chief Complaint  Headache x 1month (would like labwork)    Subjective          Dianna Clay presents to [facility]  Patient complains of headache over the last month constant pressure frontal bilateral parietal, increases throughout the midday  Fairly frequent she does have periods of relief she has no associated symptoms no nausea vomiting diaphoresis vision loss she has no fever chills  Quite a bit of stress, her daughter is having emotional problems, getting in trouble and ongoing school flunking out of school and she is having difficulties with cooperation with her ex- and daughter's father, she does not feel like he is doing his part.  Increasing the fluoxetine and bupropion is helped quite a bit emotionally she is working from home presently.    No drug or alcohol abuse no tobacco abuse.  She has a history of migraines and headaches feel differently less severe but nonetheless frequent      She is picked up 22 pounds over the last year she has decreased and poor body image does not want to go well feels frustrated and depressed over her gaining weight and her situation.  Phentermine is helped her in the past to get on top of her symptoms she is wondering if we could consider this today.                    Objective   Vital Signs:   /70   Pulse 80   Temp 98 °F (36.7 °C) (Temporal)   Ht 167.6 cm (66\")   Wt 90.3 kg (199 lb)   SpO2 98%   BMI 32.12 kg/m²     Physical Exam  Vitals reviewed.   Constitutional:       Appearance: She is well-developed.   HENT:      Head: Normocephalic.      Nose: Nose normal.   Eyes:      General: No scleral icterus.     Conjunctiva/sclera: Conjunctivae normal.      Pupils: Pupils are equal, round, and reactive to light.   Neck:      Thyroid: No thyromegaly.      Vascular: No JVD.   Cardiovascular:      Rate and Rhythm: Normal rate and regular rhythm.      Heart sounds: Normal heart sounds. No murmur. No friction rub. No gallop.  "   Pulmonary:      Effort: Pulmonary effort is normal. No respiratory distress.      Breath sounds: Normal breath sounds. No stridor. No wheezing or rales.   Abdominal:      General: Bowel sounds are normal. There is no distension.      Palpations: Abdomen is soft.      Tenderness: There is no abdominal tenderness.      Comments: No hepatosplenomegaly, no ascites,   Musculoskeletal:         General: No tenderness.      Cervical back: Neck supple.   Lymphadenopathy:      Cervical: No cervical adenopathy.   Skin:     General: Skin is warm and dry.      Findings: No erythema or rash.   Neurological:      General: No focal deficit present.      Mental Status: She is alert and oriented to person, place, and time. Mental status is at baseline.      Cranial Nerves: No cranial nerve deficit.      Sensory: No sensory deficit.      Motor: No weakness.      Coordination: Coordination normal.      Gait: Gait normal.      Deep Tendon Reflexes: Reflexes are normal and symmetric. Reflexes normal.   Psychiatric:         Behavior: Behavior normal.         Thought Content: Thought content normal.         Judgment: Judgment normal.        Result Review :                 Assessment and Plan    Diagnoses and all orders for this visit:    1. Tension headache (Primary)    2. Moderate major depression (CMS/HCC)    Other orders  -     buPROPion XL (WELLBUTRIN XL) 300 MG 24 hr tablet; Take 1 tablet by mouth Daily.  Dispense: 30 tablet; Refill: 2  -     butalbital-acetaminophen-caffeine (Fioricet) -40 MG capsule capsule; Take 1 capsule by mouth Every 4 (Four) Hours As Needed (Tension headache).  Dispense: 12 capsule; Refill: 0        Follow Up   No follow-ups on file.  Patient was given instructions and counseling regarding her condition or for health maintenance advice. Please see specific information pulled into the AVS if appropriate.     Relaxation meditation  Stop which she is doing and relax meditate deep breathing to break the  change of tension headaches  Stress management techniques  Temporarily one-time prescription Fioricet risk-benefit  Controlled substance proper storage disposal avoid chronic use may be habit-forming never mix with other sedating medications do not drive with the medication can cause sedation

## 2021-03-29 ENCOUNTER — OFFICE VISIT (OUTPATIENT)
Dept: FAMILY MEDICINE CLINIC | Facility: CLINIC | Age: 41
End: 2021-03-29

## 2021-03-29 VITALS
SYSTOLIC BLOOD PRESSURE: 100 MMHG | HEIGHT: 66 IN | OXYGEN SATURATION: 98 % | BODY MASS INDEX: 31.98 KG/M2 | WEIGHT: 199 LBS | DIASTOLIC BLOOD PRESSURE: 60 MMHG | HEART RATE: 66 BPM

## 2021-03-29 DIAGNOSIS — F41.8 MIXED ANXIETY DEPRESSIVE DISORDER: ICD-10-CM

## 2021-03-29 DIAGNOSIS — G43.009 MIGRAINE WITHOUT AURA AND WITHOUT STATUS MIGRAINOSUS, NOT INTRACTABLE: Primary | ICD-10-CM

## 2021-03-29 DIAGNOSIS — F41.9 ANXIETY: ICD-10-CM

## 2021-03-29 PROCEDURE — 99213 OFFICE O/P EST LOW 20 MIN: CPT | Performed by: NURSE PRACTITIONER

## 2021-03-29 RX ORDER — FLUTICASONE PROPIONATE 50 MCG
2 SPRAY, SUSPENSION (ML) NASAL DAILY
Qty: 9.9 ML | Refills: 11 | Status: SHIPPED | OUTPATIENT
Start: 2021-03-29

## 2021-03-29 RX ORDER — SUMATRIPTAN 100 MG/1
TABLET, FILM COATED ORAL
Qty: 9 TABLET | Refills: 1 | Status: SHIPPED | OUTPATIENT
Start: 2021-03-29 | End: 2021-04-20

## 2021-03-29 NOTE — PATIENT INSTRUCTIONS
Discharge instructions    Sumatriptan for a throbbing vascular headache maximum 2/day take at the earliest onset of any migraine headache    I think you have a combination between a tension headache and migraine    Relaxation deep breathing at least a couple minutes or more twice daily      Continue healthy diet  Mostly vegetables chicken fish caloric restriction less than 1400 daily just study stay the course 64 ounces of water    Consider intermittent fasting    Emergency room for severe persistent headache follow-up after MRI of the brain by phone  Or email for results    Urgent recheck urine increased frequency headache  Emergency room if severe or persistent

## 2021-03-30 ENCOUNTER — TELEPHONE (OUTPATIENT)
Dept: FAMILY MEDICINE CLINIC | Facility: CLINIC | Age: 41
End: 2021-03-30

## 2021-03-30 NOTE — TELEPHONE ENCOUNTER
Caller: Dianna Clay    Relationship: Self    Best call back number:[phone] (H)    Caller requesting test results:PATIENT    What test was performed: LABS    When was the test performed: 3/29/21    Where was the test performed: IN OFFICE    Additional notes:PLEASE CALL PATIENT WITH RESULTS OR UPDATE ON MY CHART. THANK YOU

## 2021-03-30 NOTE — PROGRESS NOTES
"Chief Complaint  Migraine    Subjective          Dianna Clay presents to [facility]  Follow-up headaches stress as well as uncontrolled migraines.  Patient has had a tremendous amount of stress just sold her house , will be moving into another house with her  Boyfriend I believe they are getting .  As well as daughters had severe pression, and communication difficulties, her ex has not been helping out doing his heart.  Some difficulty sleeping and challenging  Symptoms started before bupropion she feels better on the bupropion does not feel like she is having any increased anxiety or jaw clenching due to this she has had migraines quite a few years has not had an MRI she does not have triptan she does have history of a vascular throbbing headaches with some aura some nausea  She has had difficulties gaining weight having self-image difficulties.  No confusion slurred speech vision loss no paresthesias down her extremities or other associated symptoms no headache presently  Social history no drug or alcohol abuse presently    Migraine         Objective   Vital Signs:   /60   Pulse 66   Ht 167.6 cm (66\")   Wt 90.3 kg (199 lb)   SpO2 98%   BMI 32.12 kg/m²     Physical Exam  Vitals reviewed.   Constitutional:       Appearance: She is well-developed.   HENT:      Head: Normocephalic.      Nose: Nose normal.   Eyes:      General: No scleral icterus.     Conjunctiva/sclera: Conjunctivae normal.      Pupils: Pupils are equal, round, and reactive to light.   Neck:      Thyroid: No thyromegaly.      Vascular: No JVD.   Cardiovascular:      Rate and Rhythm: Normal rate and regular rhythm.      Heart sounds: Normal heart sounds. No murmur heard.   No friction rub. No gallop.    Pulmonary:      Effort: Pulmonary effort is normal. No respiratory distress.      Breath sounds: Normal breath sounds. No stridor. No wheezing or rales.   Abdominal:      General: Bowel sounds are " normal. There is no distension.      Palpations: Abdomen is soft.      Tenderness: There is no abdominal tenderness.      Comments: No hepatosplenomegaly, no ascites,   Musculoskeletal:         General: No tenderness.      Cervical back: Neck supple.   Lymphadenopathy:      Cervical: No cervical adenopathy.   Skin:     General: Skin is warm and dry.      Findings: No erythema or rash.   Neurological:      Mental Status: She is alert and oriented to person, place, and time.      Deep Tendon Reflexes: Reflexes are normal and symmetric.   Psychiatric:         Behavior: Behavior normal.         Thought Content: Thought content normal.         Judgment: Judgment normal.        Result Review :                 Assessment and Plan    Diagnoses and all orders for this visit:    1. Migraine without aura and without status migrainosus, not intractable (Primary)  -     MRI brain wo contrast  -     Ambulatory Referral to Neurology    2. Anxiety    3. Mixed anxiety depressive disorder    Other orders  -     SUMAtriptan (Imitrex) 100 MG tablet; Take one tablet at onset of headache. May repeat dose one time in 2 hours if headache not relieved.  Dispense: 9 tablet; Refill: 1  -     fluticasone (FLONASE) 50 MCG/ACT nasal spray; 2 sprays into the nostril(s) as directed by provider Daily.  Dispense: 9.9 mL; Refill: 11        Follow Up   No follow-ups on file.  Patient was given instructions and counseling regarding her condition or for health maintenance advice. Please see specific information pulled into the AVS if appropriate.     Relaxation meditation  Avoid stimulants caffeine etc.  Refer to neurology newer medications as well as further evaluation of her headaches suspect combination tension and migraine sumatriptan risk-benefit no contraindications maximum 2/day abortive therapy earlier with headaches  Consider decreasing bupropion?  She will continue fluoxetine no change recommend counseling family lawrence  If severe persistent  headache confusion blurred vision weakness paresthesias emergency room

## 2021-03-31 NOTE — TELEPHONE ENCOUNTER
Labs look great thyroid normal liver kidney function normal CBC normal  Urinalysis   Probably related to the type of specimen collection there is quite a few white blood cells and some bacteria  Probably collection contamination but if she has burning frequency urgency she may have a UTI let me know    Otherwise everything looks good

## 2021-04-01 ENCOUNTER — TELEPHONE (OUTPATIENT)
Dept: FAMILY MEDICINE CLINIC | Facility: CLINIC | Age: 41
End: 2021-04-01

## 2021-04-01 DIAGNOSIS — R82.81 PYURIA: Primary | ICD-10-CM

## 2021-04-01 DIAGNOSIS — Z11.3 SCREEN FOR STD (SEXUALLY TRANSMITTED DISEASE): ICD-10-CM

## 2021-04-01 NOTE — TELEPHONE ENCOUNTER
----- Message from Raysa Mckeon sent at 4/1/2021 11:11 AM EDT -----  Calling asking about her lab results. Can you please review.

## 2021-04-01 NOTE — TELEPHONE ENCOUNTER
Thyroid looks fine, no need for thyroid medication at this time and will fluctuate and still quite normal range  That said we should check this annually     Please have her come in for a urine culture will get this outpatient to make sure she does not have a UTI  I already entered the orders

## 2021-04-01 NOTE — TELEPHONE ENCOUNTER
Patient is concern about her kidney and thyroid that it has increase to 3.0 in over a year. She states all the women in her family has thyroid issue. She c/o fatigue an increase weight gain. Also she is concern about her kidney. She does have urinary frequency.    Advise.

## 2021-04-02 NOTE — TELEPHONE ENCOUNTER
Spoke with patient verbalized understanding she still have concern advise patient to discuss this with Butch via phone telehealth

## 2021-04-05 ENCOUNTER — TELEPHONE (OUTPATIENT)
Dept: FAMILY MEDICINE CLINIC | Facility: CLINIC | Age: 41
End: 2021-04-05

## 2021-04-05 RX ORDER — SULFAMETHOXAZOLE AND TRIMETHOPRIM 800; 160 MG/1; MG/1
1 TABLET ORAL 2 TIMES DAILY
Qty: 14 TABLET | Refills: 0 | Status: CANCELLED | OUTPATIENT
Start: 2021-04-05 | End: 2021-04-12

## 2021-04-05 NOTE — TELEPHONE ENCOUNTER
PATIENT CALLING IN REGARDS TO SPEAK WITH BETTY DR EPLEY'S ASSISTANT ABOUT CALLING IN SOMETHING FOR A POSSIBLE UTI. PLEASE ADVISE, THANK YOU!    [facility]  PHONE: [phone]    [address] [facility]

## 2021-04-05 NOTE — TELEPHONE ENCOUNTER
PT STATES SHE TALKED WITH APRN JAMES EPLEY ABOUT POSSIBLE UTI OR KIDNEY STONES. PT STATES SHE IS IN PAIN AND UNABLE TO URINATE EVEN THOUGH SHE HAS THE URGE TO GO. PT WOULD LIKE TO KNOW IF AN ANTIBIOTIC CAN BE CALLED IN. PT STATES SHE IS OUT OF TOWN.    PHARMACY [id number] - [place], [place] - [address] [place] [address] AT CORNER OF [facility] [address] - [phone]  - [phone]  [phone]

## 2021-04-05 NOTE — TELEPHONE ENCOUNTER
----- Message from Dennies Garrick, MA sent at 4/5/2021  9:37 AM EDT -----  Regarding: FW: Prescription Question  Contact: [phone]    ----- Message -----  From: Dianna Clay  Sent: 4/5/2021   9:24 AM EDT  To: Meryl Dobbins [facility]  Subject: Prescription Question                            Good morning,  I know we discussed a possible UTI or kidney infection last week and I have been having symptoms and been in pain. My left side of my back where my kidney is, has been hurting and having shooting pains at times. When I urinate it is somewhat painful and I feel like I need to frequently but then I don't. I have also had a little bit of blood when I wipe. I am out of town and am hoping I can get some type of antibiotic. Please call me when you can.  Thank you,   Dianna

## 2021-04-05 NOTE — TELEPHONE ENCOUNTER
Please call patient and give her Bactrim DS 1 p.o. twice daily x7 days push fluids  Any severe pain fever chills nausea vomiting emergency room  Recheck in the office when she gets home if she still symptomatic  She should be better in 2 to 3 days otherwise she should go to urgent care thank you!!    A little sun sensitivity can be caused by excessive sunlight  Okay to get a little sun but she should avoid excessive sun use a hat sunscreen  If fever rash stop taking the medicine go ER thank you

## 2021-04-09 RX ORDER — TRAZODONE HYDROCHLORIDE 50 MG/1
TABLET ORAL
Qty: 30 TABLET | Refills: 3 | Status: SHIPPED | OUTPATIENT
Start: 2021-04-09 | End: 2021-04-27

## 2021-04-13 RX ORDER — ALPRAZOLAM 1 MG/1
1 TABLET ORAL DAILY
Qty: 1 TABLET | Refills: 0 | Status: SHIPPED | OUTPATIENT
Start: 2021-04-13 | End: 2021-04-27

## 2021-04-14 ENCOUNTER — HOSPITAL ENCOUNTER (OUTPATIENT)
Dept: MRI IMAGING | Facility: HOSPITAL | Age: 41
Discharge: HOME | End: 2021-04-14
Admitting: NURSE PRACTITIONER

## 2021-04-14 PROCEDURE — 70551 MRI BRAIN STEM W/O DYE: CPT

## 2021-04-16 ENCOUNTER — BULK ORDERING (OUTPATIENT)
Dept: CASE MANAGEMENT | Facility: OTHER | Age: 41
End: 2021-04-16

## 2021-04-16 ENCOUNTER — TELEPHONE (OUTPATIENT)
Dept: FAMILY MEDICINE CLINIC | Facility: CLINIC | Age: 41
End: 2021-04-16

## 2021-04-16 DIAGNOSIS — G43.009 MIGRAINE WITHOUT AURA AND WITHOUT STATUS MIGRAINOSUS, NOT INTRACTABLE: Primary | ICD-10-CM

## 2021-04-16 DIAGNOSIS — Z23 IMMUNIZATION DUE: ICD-10-CM

## 2021-04-16 DIAGNOSIS — R90.89 MRI OF BRAIN ABNORMAL: ICD-10-CM

## 2021-04-20 ENCOUNTER — OFFICE VISIT (OUTPATIENT)
Dept: NEUROLOGY | Facility: CLINIC | Age: 41
End: 2021-04-20

## 2021-04-20 ENCOUNTER — TELEPHONE (OUTPATIENT)
Dept: NEUROLOGY | Facility: CLINIC | Age: 41
End: 2021-04-20

## 2021-04-20 VITALS
WEIGHT: 200 LBS | HEIGHT: 66 IN | BODY MASS INDEX: 32.14 KG/M2 | SYSTOLIC BLOOD PRESSURE: 100 MMHG | DIASTOLIC BLOOD PRESSURE: 62 MMHG

## 2021-04-20 DIAGNOSIS — G43.009 MIGRAINE WITHOUT AURA AND WITHOUT STATUS MIGRAINOSUS, NOT INTRACTABLE: Primary | ICD-10-CM

## 2021-04-20 PROCEDURE — 99204 OFFICE O/P NEW MOD 45 MIN: CPT | Performed by: PSYCHIATRY & NEUROLOGY

## 2021-04-20 RX ORDER — RIZATRIPTAN BENZOATE 10 MG/1
10 TABLET ORAL ONCE AS NEEDED
Qty: 12 TABLET | Refills: 2 | Status: SHIPPED | OUTPATIENT
Start: 2021-04-20 | End: 2021-06-22 | Stop reason: SDUPTHER

## 2021-04-20 NOTE — TELEPHONE ENCOUNTER
Notified pt we would see if the peer to peer happens tomorrow, if it doesn't, then we will hold off all together just to see if the emgality works. She vu

## 2021-04-20 NOTE — ASSESSMENT & PLAN NOTE
41 year old woman with migraine headaches with occipital neuralgia component on examination.  Normal neurological examination otherwise without any increased reflexes or focality.  I reviewed her brain MRI scan independently on visit today and agree with radiology read of nonspecific white matter changes which can be related to migraines.  She is on topiramate and multiple antidepressants at this time and continues to have daily headaches and 3-4 migraine days per week.  She is also concerned about possible kidney stones with the topiramate so I recommended discontinuing this medicine at this time.  I would not try TCAs due to being on antidepressants.  I will also not try beta-blockers as her BP is low.  I advised her to drink lots of fluids.  I will instead try her on CGRP medication Emgality for migraine prevention and also set her up for ONB as she has tenderness to palpation in the occipital region over the greater occipital nerves bilaterally.  Will discontinue the sumatriptan which was not helpful and instead prescribe rizatriptan for acute treatment.  Discussed potential side effects including risk for serotonin syndrome.  I discussed migraine triggers and lifestyle modifications at length and provided patient education information today.  Will follow up in 2 months.

## 2021-04-20 NOTE — PROGRESS NOTES
Chief Complaint  Migraine (began 15 or so years ago and worsened a few months ago, increased stress- takes imitrex, MRI 4/14)    Subjective          Dianna Clay presents to [facility] for   HISTORY OF PRESENT ILLNESS:    Dianna Clay is a 41 year old right handed woman who presents to neurology clinic for initial evaluation and treatment of migraines.  She reports her migraines starting about 15 years ago and were under control and about 3 months ago they started getting worse.  Her migraines are located in the back of her head and also front of her head with throbbing quality which she rates as 8-10/10 on pain scale 1-10 with associated light and sound sensitivity and nausea and vomiting at times.  Headaches typically last a couple days and she feels like she has had a constant daily headache over the past 3 months.  She has tried topiramate 200mg daily which she is currently taking which initially helped but has not helped more recently.  She is on Prozac and bupropion currently.  She has had increased stress in her life due to not seeing her daughter over the past 8 months.  She also lost her dog.  She has tried Imitrex and Fioricet which she does not think was helpful.  She denies family history of migraines.  She had a brain MRI scan done on 4/14/2021 which I reviewed the images independently on her visit today and her brain images look good with some nonspecific white matter changes which can be seen with migraines.      History reviewed. No pertinent past medical history.     History reviewed. No pertinent family history.     Social History     Socioeconomic History   • Marital status:      Spouse name: Not on file   • Number of children: Not on file   • Years of education: Not on file   • Highest education level: Not on file   Tobacco Use   • Smoking status: Never Smoker   • Smokeless tobacco: Never Used   Substance and Sexual Activity   • Alcohol use: Yes   • Drug  "use: Never   • Sexual activity: Defer        I have personally reviewed the ROS as stated below.     Review of Systems   Constitutional: Positive for activity change, appetite change and fatigue.   HENT: Positive for hearing loss, trouble swallowing and voice change.    Respiratory: Positive for shortness of breath. Negative for cough and wheezing.    Cardiovascular: Positive for chest pain. Negative for palpitations and leg swelling.   Gastrointestinal: Positive for abdominal pain. Negative for nausea and vomiting.   Endocrine: Negative for cold intolerance and heat intolerance.   Genitourinary: Positive for urgency. Negative for decreased urine volume and urinary incontinence.   Musculoskeletal: Positive for back pain and neck pain. Negative for gait problem.   Allergic/Immunologic: Positive for environmental allergies. Negative for food allergies.   Neurological: Positive for dizziness, light-headedness and headache. Negative for tremors, seizures, syncope, facial asymmetry, speech difficulty, weakness, numbness, memory problem and confusion.   Psychiatric/Behavioral: Positive for decreased concentration, positive for hyperactivity and depressed mood. Negative for agitation, behavioral problems, dysphoric mood, hallucinations, self-injury, sleep disturbance, suicidal ideas and stress. The patient is nervous/anxious.         Objective   Vital Signs:   /62 (BP Location: Left arm, Patient Position: Sitting)   Ht 167.6 cm (65.98\")   Wt 90.7 kg (200 lb)   BMI 32.30 kg/m²       PHYSICAL EXAM:    General   Mental Status - Alert. General Appearance - Well developed, Well groomed, Oriented and Cooperative. Orientation - Oriented X3.       Head and Neck  Head - normocephalic, atraumatic with no lesions or palpable masses.  Tenderness in back of her head to palpation over the greater occipital nerves  Neck    Global Assessment - supple.       Eye   Sclera/Conjunctiva - Bilateral - Normal.    ENMT  Mouth and Throat "   Oral Cavity/Oropharynx: Oropharynx - the soft palate,uvula and tongue are normal in appearance.    Chest and Lung Exam   Chest - lung clear to auscultation bilaterally.    Cardiovascular   Cardiovascular examination reveals  - normal heart sounds, regular rate and rhythm.    Neurologic   Mental Status: Speech - Normal. Cognitive function - appropriate fund of knowledge. No impairment of attention, Impairment of concentration, impairment of long term memory or impairment of short term memory.  Cranial Nerves:   II Optic: Visual acuity - Left - Normal. Right - Normal. Visual fields - Normal (to confrontation).  III Oculomotor: Pupillary constriction - Left - Normal. Right - Normal.  VII Facial: - Normal Bilaterally.  VIII Acoustic - Bilateral - Hearing normal and (Hearing tested by finger rub).   IX Glossopharyngeal / X Vagus - Normal.  XI Accessory: Trapezius - Bilateral - Normal. Sternocleidomastoid - Bilateral - Normal.  XII Hypoglossal - Bilateral - Normal.  Eye Movements: - Normal Bilaterally.  Sensory:   Light Touch: Intact - Globally.  Motor:   Bulk and Contour: - Normal.  Tone: - Normal.  Tremor: Not present.  Strength: 5/5 normal muscle strength - All Muscles.   General Assessment of Reflexes: - deep tendon reflexes are normal. Coordination - No Impairment of finger-to-nose or Impairment of rapid alternating movements. Gait - Normal.       Result Review :                 Assessment and Plan    Problem List Items Addressed This Visit        Neuro    Migraine without aura and without status migrainosus, not intractable - Primary    Current Assessment & Plan     41 year old woman with migraine headaches with occipital neuralgia component on examination.  Normal neurological examination otherwise without any increased reflexes or focality.  I reviewed her brain MRI scan independently on visit today and agree with radiology read of nonspecific white matter changes which can be related to migraines.  She is on  topiramate and multiple antidepressants at this time and continues to have daily headaches and 3-4 migraine days per week.  She is also concerned about possible kidney stones with the topiramate so I recommended discontinuing this medicine at this time.  I would not try TCAs due to being on antidepressants.  I will also not try beta-blockers as her BP is low.  I advised her to drink lots of fluids.  I will instead try her on CGRP medication Emgality for migraine prevention and also set her up for ONB as she has tenderness to palpation in the occipital region over the greater occipital nerves bilaterally.  Will discontinue the sumatriptan which was not helpful and instead prescribe rizatriptan for acute treatment.  Discussed potential side effects including risk for serotonin syndrome.  I discussed migraine triggers and lifestyle modifications at length and provided patient education information today.  Will follow up in 2 months.               Relevant Medications    galcanezumab-gnlm (EMGALITY) 120 MG/ML auto-injector pen    galcanezumab-gnlm (EMGALITY) 120 MG/ML auto-injector pen    rizatriptan (Maxalt) 10 MG tablet          I spent time caring for Dianna on this date of service. This time includes time spent by me in the following activities:preparing for the visit, reviewing tests, obtaining and/or reviewing a separately obtained history, performing a medically appropriate examination and/or evaluation , counseling and educating the patient/family/caregiver, ordering medications, tests, or procedures, documenting information in the medical record, independently interpreting results and communicating that information with the patient/family/caregiver and care coordination    Follow Up   Return in about 2 months (around 6/20/2021).  Patient was given instructions and counseling regarding her condition or for health maintenance advice. Please see specific information pulled into the AVS if appropriate.

## 2021-04-20 NOTE — TELEPHONE ENCOUNTER
Kellyd call from Isaiah with pts insurance calling to set up a peer to peer for pts ONB-- scheduled for Wednesday between 12 and 3 gave the back line phone number. Notified Dr. Mackenzie, he stated that's ok, if they call tomorrow great if not we will hold off on the procedure to see how pt does with the emgality. Will notify pt

## 2021-04-20 NOTE — TELEPHONE ENCOUNTER
Pt scheduled for ONB on 4/22 --started PA through pts [employer] insurance-- Reference/notification # [id number]      Currently pending- requested clinical notes be faxed to [phone]--waiting on approval

## 2021-04-21 ENCOUNTER — PATIENT MESSAGE (OUTPATIENT)
Dept: FAMILY MEDICINE CLINIC | Facility: CLINIC | Age: 41
End: 2021-04-21

## 2021-04-21 RX ORDER — TRAZODONE HYDROCHLORIDE 100 MG/1
100 TABLET ORAL NIGHTLY
Qty: 90 TABLET | Refills: 1 | Status: SHIPPED | OUTPATIENT
Start: 2021-04-21 | End: 2021-07-29

## 2021-04-21 NOTE — TELEPHONE ENCOUNTER
From: Dianna Clay  To: James Epley, APRN  Sent: 4/21/2021 2:26 PM EDT  Subject: Prescription Question    Hi,     I have been taking 100 mg (2-50mg)of my trazadone like we discussed previously during an office visit, but the prescription says take 1-50mg tablet. I am almost out and need a refill.     Can you please send that over?  Thank you,  Dianna

## 2021-04-27 ENCOUNTER — OFFICE VISIT (OUTPATIENT)
Dept: INTERNAL MEDICINE | Facility: CLINIC | Age: 41
End: 2021-04-27

## 2021-04-27 VITALS
SYSTOLIC BLOOD PRESSURE: 98 MMHG | HEART RATE: 81 BPM | OXYGEN SATURATION: 98 % | BODY MASS INDEX: 32.5 KG/M2 | TEMPERATURE: 97.7 F | WEIGHT: 202.2 LBS | DIASTOLIC BLOOD PRESSURE: 64 MMHG | HEIGHT: 66 IN

## 2021-04-27 DIAGNOSIS — N39.0 FREQUENT UTI: ICD-10-CM

## 2021-04-27 DIAGNOSIS — F41.8 MIXED ANXIETY DEPRESSIVE DISORDER: ICD-10-CM

## 2021-04-27 DIAGNOSIS — F51.01 PRIMARY INSOMNIA: ICD-10-CM

## 2021-04-27 DIAGNOSIS — M54.50 CHRONIC LEFT-SIDED LOW BACK PAIN WITHOUT SCIATICA: Primary | ICD-10-CM

## 2021-04-27 DIAGNOSIS — G89.29 CHRONIC LEFT-SIDED LOW BACK PAIN WITHOUT SCIATICA: Primary | ICD-10-CM

## 2021-04-27 DIAGNOSIS — F19.10 DRUG ABUSE (HCC): ICD-10-CM

## 2021-04-27 DIAGNOSIS — K59.00 CONSTIPATION, UNSPECIFIED CONSTIPATION TYPE: ICD-10-CM

## 2021-04-27 PROBLEM — K76.0 FATTY LIVER: Status: ACTIVE | Noted: 2021-04-27

## 2021-04-27 PROCEDURE — 99214 OFFICE O/P EST MOD 30 MIN: CPT | Performed by: NURSE PRACTITIONER

## 2021-04-27 RX ORDER — BUPROPION HYDROCHLORIDE 150 MG/1
150 TABLET ORAL DAILY
Qty: 90 TABLET | Refills: 2 | Status: SHIPPED | OUTPATIENT
Start: 2021-04-27 | End: 2022-07-05

## 2021-04-27 RX ORDER — CETIRIZINE HYDROCHLORIDE 10 MG/1
10 TABLET ORAL DAILY
COMMUNITY

## 2021-04-27 NOTE — PROGRESS NOTES
"Subjective   Dianna Clay is a 41 y.o. female.     History of Present Illness   The patient is here today for CPE and lab work F/U. Here today to establish care, previous provider Butch Li.     Under a lot of stress with children. Dtr is 16 and has been in and out of the hospital with psych problems and cutting.   15 yr old son, Father has not seen his son in almost a year.   Dtr has been with Dad since December, she disagrees with this. She is supposed to have full custody.   She is seeing a therapist.  Her dog passed on randee day, he drowned.   She got engaged in December, to get  this year, 9/2021 in [facility].   They have bought a new house, to close in June.     \"My kidneys dont feel right, my back hurts all the time.\" she was treated for UTI with limited benefit. \"I have had some many yeast infections and bacterial infections in the last few years.\"  No numbness or tingling down legs. Denies incontinence. Some radiation down to the legs, left side is worse.     Migraines-  These started in December/january 2021,MRI 3/2021 with no specific white matter changes, now seeing Dr. Mackenzie, recently started on CGRP  She is to get steroid injections in her neck, waiting on insurance approval.   Would like to review labs from March.     She started taking laxatives about a year ago, due to constipation. Takes sennokot. She feels like this helps her to not gain weight. She has taken about 8 a night.     Ct abd/pelvis showed masslike area of liver, needed MRI of abd. MRI abd was neg other than fatty liver. This also showed a stomach ulcers. She then had an EGD and C-scope, put on protonix.    Concerned with wt gain. Hx of max wt 260 lbs, has been able to lose 90 lbs in the past. Recently restarted kick boxing.     Anxiety and depression- wellbutrin and prozac doses increased in February. Stopped medicine in the last few weeks. She has taken other medications, unsure which were the best.   Used to take " xanax and ambien, hates these meds.   The trazodone helps at 100 mg.     Just got a new puppy 6 weeks ago, the sister of her dog.     New job in February. This is overwhelming.     G-4 P-2 spont abort- 2  This past weekend did do cocaine, this is not normal for her.   The following portions of the patient's history were reviewed and updated as appropriate: allergies, current medications, past family history, past medical history, past social history, past surgical history and problem list.    Review of Systems   Constitutional: Negative for chills and fever.   Respiratory: Positive for shortness of breath (having a harder time breathing now, she feels this is due to wt gain).    Cardiovascular: Negative.    Psychiatric/Behavioral: Positive for sleep disturbance and depressed mood. Negative for dysphoric mood and suicidal ideas. The patient is nervous/anxious.        Objective   Physical Exam  Constitutional:       Appearance: Normal appearance. She is well-developed.   Neck:      Thyroid: No thyromegaly.   Cardiovascular:      Rate and Rhythm: Normal rate and regular rhythm.      Heart sounds: Normal heart sounds.   Pulmonary:      Effort: Pulmonary effort is normal.      Breath sounds: Normal breath sounds.   Musculoskeletal:      Cervical back: Normal range of motion and neck supple.      Lumbar back: Spasms, tenderness and bony tenderness present. No swelling. Normal range of motion. Negative left straight leg raise test.        Back:    Lymphadenopathy:      Cervical: No cervical adenopathy.   Skin:     General: Skin is warm and dry.   Neurological:      Mental Status: She is alert.   Psychiatric:         Behavior: Behavior normal.         Thought Content: Thought content normal.         Judgment: Judgment normal.         Vitals:    04/27/21 1135   BP: 98/64   Pulse: 81   Temp: 97.7 °F (36.5 °C)   SpO2: 98%     Body mass index is 32.66 kg/m².      Assessment/Plan   Diagnoses and all orders for this visit:    1.  Chronic left-sided low back pain without sciatica (Primary)  -     XR Spine Lumbar 4+ View  -     Ambulatory Referral to Physical Therapy Evaluate and treat    2. Constipation, unspecified constipation type    3. Mixed anxiety depressive disorder  -     buPROPion XL (WELLBUTRIN XL) 150 MG 24 hr tablet; Take 1 tablet by mouth Daily.  Dispense: 90 tablet; Refill: 2    4. Primary insomnia    5. Drug abuse (CMS/[facility])    6. Frequent UTI  -     Ambulatory Referral to Gynecologic Urology                 1. Back pain- check lumbar spine x-ray, start PT  2. Constipation- continue hydration, ok to use colace stool softener, if this continues suggest colonoscopy   3. Anxiety and depression- try back wellbutrin 150 mg daily   4. Insomnia- continue same for now  5. Drug abuse- she is not going to do this again, discuss with therapist   6. Frequent UTIS- see urogyn    Has had first COVID vaccination.   GYN- UTD  F/U in 4-6 wks for CPE, will not need labs  Will discuss phentermine at f/u   Need report of EGD and C-scope.

## 2021-05-21 ENCOUNTER — TELEPHONE (OUTPATIENT)
Dept: NEUROLOGY | Facility: CLINIC | Age: 41
End: 2021-05-21

## 2021-05-21 NOTE — TELEPHONE ENCOUNTER
PA started through [employer] for patient's Emgality. Case ID #[id number] KEY from [employer] [id number]

## 2021-05-26 ENCOUNTER — OFFICE VISIT (OUTPATIENT)
Dept: INTERNAL MEDICINE | Facility: CLINIC | Age: 41
End: 2021-05-26

## 2021-05-26 VITALS
WEIGHT: 209 LBS | SYSTOLIC BLOOD PRESSURE: 102 MMHG | DIASTOLIC BLOOD PRESSURE: 62 MMHG | TEMPERATURE: 97.8 F | HEART RATE: 73 BPM | HEIGHT: 66 IN | BODY MASS INDEX: 33.59 KG/M2 | OXYGEN SATURATION: 99 %

## 2021-05-26 DIAGNOSIS — M54.50 CHRONIC LEFT-SIDED LOW BACK PAIN, UNSPECIFIED WHETHER SCIATICA PRESENT: ICD-10-CM

## 2021-05-26 DIAGNOSIS — Z00.00 HEALTH CARE MAINTENANCE: Primary | ICD-10-CM

## 2021-05-26 DIAGNOSIS — R63.8 INCREASED BMI: ICD-10-CM

## 2021-05-26 DIAGNOSIS — F41.8 MIXED ANXIETY DEPRESSIVE DISORDER: ICD-10-CM

## 2021-05-26 DIAGNOSIS — Z79.899 HIGH RISK MEDICATION USE: Primary | ICD-10-CM

## 2021-05-26 DIAGNOSIS — L70.9 ACNE, UNSPECIFIED ACNE TYPE: ICD-10-CM

## 2021-05-26 DIAGNOSIS — M85.679 BONE CYST OF FOOT: ICD-10-CM

## 2021-05-26 DIAGNOSIS — G89.29 CHRONIC LEFT-SIDED LOW BACK PAIN, UNSPECIFIED WHETHER SCIATICA PRESENT: ICD-10-CM

## 2021-05-26 DIAGNOSIS — Z79.899 HIGH RISK MEDICATION USE: ICD-10-CM

## 2021-05-26 PROCEDURE — 93000 ELECTROCARDIOGRAM COMPLETE: CPT | Performed by: NURSE PRACTITIONER

## 2021-05-26 PROCEDURE — 99395 PREV VISIT EST AGE 18-39: CPT | Performed by: NURSE PRACTITIONER

## 2021-05-26 RX ORDER — MINOCYCLINE HYDROCHLORIDE 100 MG/1
100 CAPSULE ORAL DAILY
Qty: 60 CAPSULE | Refills: 0 | Status: SHIPPED | OUTPATIENT
Start: 2021-05-26 | End: 2021-07-29 | Stop reason: SDUPTHER

## 2021-05-26 RX ORDER — PHENTERMINE HYDROCHLORIDE 37.5 MG/1
37.5 TABLET ORAL
Qty: 30 TABLET | Refills: 0 | Status: CANCELLED | OUTPATIENT
Start: 2021-05-26

## 2021-05-26 NOTE — PROGRESS NOTES
"Subjective   Dianna Clay is a 41 y.o. female.     History of Present Illness   The patient is here today for CPE.    Anxiety and depression-Pt is doing well with current medication regimen, denies adverse reactions, compliant with medication schedule.   Under a lot of stress with children. Dtr is 16 and has been in and out of the hospital with psych problems and cutting.   15 yr old son, Father has not seen his son in almost a year.   Dtr has been with Dad since December, she disagrees with this. She is supposed to have full custody.   Migraines- per neuro  Back pain- l spine x-ray ordered, did not complete, PT ordered  Constipation- improved, hydrating, she is taking fiber with a cleanse she is doing. She is taking colace. She is not taking sennokot.   Obesity- struggling with motivation, \"I know I eat too much.\" Cutting back on bread.   She is meeting with her  today.     She got engaged in December, to get  this year, 9/2021 in [facility].   He has just moved in to her house with his dogs. His children are not helping things.     [employer]- supplements, protein shakes, fiber for wt loss  The following portions of the patient's history were reviewed and updated as appropriate: allergies, current medications, past family history, past medical history, past social history, past surgical history and problem list.    Review of Systems   Constitutional: Negative for chills, fatigue and fever.   HENT: Negative for ear pain, rhinorrhea and sore throat.    Eyes: Negative.    Respiratory: Negative for cough and shortness of breath.    Cardiovascular: Negative.    Gastrointestinal: Positive for constipation and nausea.   Endocrine: Negative for cold intolerance and heat intolerance.   Genitourinary: Negative for breast discharge, breast lump, breast pain, difficulty urinating, dyspareunia, dysuria, flank pain, frequency, genital sores, hematuria, pelvic pain and urgency.   Musculoskeletal: Positive " for back pain.   Skin: Positive for skin lesions (spot on left foot, came up about 6 months ago, is getting bigger, non tender, also soft lump on left side).   Allergic/Immunologic: Positive for environmental allergies. Negative for food allergies.   Neurological: Positive for headache.   Hematological: Negative.    Psychiatric/Behavioral: Positive for depressed mood and stress. Negative for dysphoric mood and suicidal ideas. The patient is nervous/anxious.        Objective   Physical Exam  Constitutional:       Appearance: Normal appearance. She is well-developed.      Comments: Body mass index is 33.75 kg/m².     HENT:      Right Ear: Hearing, tympanic membrane, ear canal and external ear normal.      Left Ear: Hearing, tympanic membrane, ear canal and external ear normal.   Eyes:      General: Lids are normal.      Conjunctiva/sclera: Conjunctivae normal.      Pupils: Pupils are equal, round, and reactive to light.   Neck:      Thyroid: No thyroid mass or thyromegaly.      Vascular: No carotid bruit.      Trachea: Trachea normal.   Cardiovascular:      Rate and Rhythm: Normal rate and regular rhythm.      Pulses: Normal pulses.      Heart sounds: Normal heart sounds.   Pulmonary:      Effort: Pulmonary effort is normal.      Breath sounds: Normal breath sounds.   Abdominal:      General: Bowel sounds are normal.      Palpations: Abdomen is soft.      Tenderness: There is no abdominal tenderness.   Musculoskeletal:         General: Normal range of motion.      Cervical back: Normal range of motion and neck supple.   Lymphadenopathy:      Cervical: No cervical adenopathy.      Upper Body:      Right upper body: No supraclavicular adenopathy.      Left upper body: No supraclavicular adenopathy.      Lower Body: No right inguinal adenopathy. No left inguinal adenopathy.   Skin:     General: Skin is warm and dry.   Neurological:      Mental Status: She is alert and oriented to person, place, and time.      GCS: GCS  eye subscore is 4. GCS verbal subscore is 5. GCS motor subscore is 6.      Cranial Nerves: No cranial nerve deficit.      Sensory: No sensory deficit.      Deep Tendon Reflexes:      Reflex Scores:       Patellar reflexes are 2+ on the right side and 2+ on the left side.  Psychiatric:         Speech: Speech normal.         Behavior: Behavior normal. Behavior is cooperative.         Thought Content: Thought content normal.         Judgment: Judgment normal.         Vitals:    05/26/21 1344   BP: 102/62   Pulse: 73   Temp: 97.8 °F (36.6 °C)   SpO2: 99%     Body mass index is 33.75 kg/m².      Assessment/Plan   Diagnoses and all orders for this visit:    1. Health care maintenance (Primary)    2. Chronic left-sided low back pain, unspecified whether sciatica present  -     Ambulatory Referral to Physical Therapy Evaluate and treat    3. Mixed anxiety depressive disorder    4. Increased BMI    5. Bone cyst of foot  -     XR Foot 3+ View Left    6. Acne, unspecified acne type  -     minocycline (MINOCIN,DYNACIN) 100 MG capsule; Take 1 capsule by mouth Daily. 10 to 14 days as needed for acne flare  Dispense: 60 capsule; Refill: 0    7. High risk medication use  -     Cancel: 527857 8+Oxycodone+Crt-Unbund - Urine, Clean Catch  -     ECG 12 Lead    Other orders  -     Cancel: phentermine (ADIPEX-P) 37.5 MG tablet; Take 1 tablet by mouth Every Morning Before Breakfast.  Dispense: 30 tablet; Refill: 0        High risk med use/EKG- NSR, no baseline for comparison         1. Preventative counseling- work on nutrition and exercise  2. Back pain- re discussed x-ray, will order PT  3. Anxiety and depression- she prefers to continue same  4. Obesity- ok to retry phentermine, food journaling, increase exercise  Discussed at length proper use and SEs, we will do UDS and send in med once cleared. EKG, CSA and ayse today.  Discussed med is not pregnancy safe   5. Cyst- check x-ray, see podiatry or ortho if this continues or  worsens  6. Acne- ok for refill     Due to see GYN

## 2021-05-27 ENCOUNTER — TELEPHONE (OUTPATIENT)
Dept: NEUROLOGY | Facility: CLINIC | Age: 41
End: 2021-05-27

## 2021-05-27 DIAGNOSIS — R63.8 INCREASED BMI: Primary | ICD-10-CM

## 2021-05-27 LAB
AMPHETAMINES UR QL: NEGATIVE NG/ML
BARBITURATES UR QL SCN: NEGATIVE NG/ML
BENZODIAZ UR QL SCN: NEGATIVE NG/ML
COCAINE UR QL SCN: NEGATIVE NG/ML
CREAT UR-MCNC: 42.8 MG/DL (ref 20–300)
METHADONE UR QL SCN: NEGATIVE NG/ML
OPIATES UR QL SCN: NEGATIVE NG/ML
OXYCODONE+OXYMORPHONE UR QL SCN: NEGATIVE NG/ML
PCP UR QL SCN: NEGATIVE NG/ML
PH UR: 5.8 [PH] (ref 4.5–8.9)
PROPOXYPH UR QL SCN: NEGATIVE NG/ML

## 2021-05-27 RX ORDER — PHENTERMINE HYDROCHLORIDE 37.5 MG/1
37.5 CAPSULE ORAL EVERY MORNING
Qty: 30 CAPSULE | Refills: 0 | Status: SHIPPED | OUTPATIENT
Start: 2021-05-27 | End: 2021-06-24

## 2021-05-27 NOTE — TELEPHONE ENCOUNTER
Pharmacy is aware she is received her loading dose on 4/20/2021 while in our office. They have approved her emgality from 5/26/2021-8/26/2021.     Called and LVM for the patient letting her know her medication has been approved.

## 2021-05-27 NOTE — TELEPHONE ENCOUNTER
Pharmacy Name:   [facility]     Pharmacy representative name: DONNIE     Pharmacy representative phone number: [phone]    What medication are you calling in regards to: IMGALITY     What question does the pharmacy have: PHARM WAS WANTING TO MAKE SURE SHE HAS HAD HER STARTER DOSE.    Who is the provider that prescribed the medication: DR FRANCIS     Additional notes: PLEASE ADVISE.

## 2021-06-22 ENCOUNTER — TELEMEDICINE (OUTPATIENT)
Dept: NEUROLOGY | Facility: CLINIC | Age: 41
End: 2021-06-22

## 2021-06-22 DIAGNOSIS — G43.709 CHRONIC MIGRAINE WITHOUT AURA WITHOUT STATUS MIGRAINOSUS, NOT INTRACTABLE: Primary | ICD-10-CM

## 2021-06-22 PROCEDURE — 99213 OFFICE O/P EST LOW 20 MIN: CPT | Performed by: PSYCHIATRY & NEUROLOGY

## 2021-06-22 RX ORDER — RIZATRIPTAN BENZOATE 10 MG/1
10 TABLET ORAL ONCE AS NEEDED
Qty: 12 TABLET | Refills: 2 | Status: SHIPPED | OUTPATIENT
Start: 2021-06-22 | End: 2021-08-03 | Stop reason: SDUPTHER

## 2021-06-22 NOTE — ASSESSMENT & PLAN NOTE
41 year old woman with chronic migraine headaches with occipital neuralgia component on examination.  Normal neurological examination otherwise without any increased reflexes or focality on prior exam in person and no focality to exam on evaluation today.  I reviewed her brain MRI scan independently on her initial visit and agree with radiology read of nonspecific white matter changes which can be related to migraines.  She has discontinued the topiramate which was not helping and multiple antidepressants have been used which also did not help and continues to have daily headaches and 15+ migraine days per week.  She is also concerned about possible kidney stones with the topiramate so I recommended discontinuing this medicine at this time.  I would not try TCAs due to being on antidepressants.  I will also not try beta-blockers as her BP is low on prior visit.  I advised her to drink lots of fluids.  I started her on CGRP medication Emgality for migraine prevention which she has not noticed much assistance and has had 2 injections thus far, I advised her to take the third injection to see if this helps further following the third injection and also tried to set her up for ONB as she has tenderness to palpation in the occipital region over the greater occipital nerves bilaterally but insurance denied this medicine.  Will continue the Maxalt which seems to be helping better than the sumatriptan for acute treatment.  Discussed potential side effects including risk for serotonin syndrome.  I discussed migraine triggers and lifestyle modifications at length and provided patient education information today.  Will follow up in 1 month and if Emgality is not helping will try Botox injections.

## 2021-06-22 NOTE — PROGRESS NOTES
Chief Complaint:  Migraines    Subjective          Dianna Clay presents to [facility] for   HISTORY OF PRESENT ILLNESS:    Dianna Clay is a 41 year old right handed woman who returns to neurology clinic for initial evaluation and treatment of migraines.  She reports her migraines starting about 15 years ago and were under control and about 5 months ago they started getting worse.  Her migraines are located in the back of her head and also front of her head with throbbing quality which she rates as 8-10/10 on pain scale 1-10 with associated light and sound sensitivity and nausea and vomiting at times.  Headaches typically last a couple days and she feels like she has had a constant daily headache over the past 5 months.  She has tried topiramate 200mg daily which she is currently taking which initially helped but has not helped more recently.  She was previously on Prozac which has been discontinued and she continues to take bupropion currently.  She has had increased stress in her life.  She also lost her dog.  She has tried Imitrex and Fioricet which she does not think was helpful.  Maxalt seems to be helping better.  She denies family history of migraines.  She had a brain MRI scan done on 4/14/2021 which looks good with some nonspecific white matter changes which can be seen with migraines.  I started her on Emgality following initial visit and she has had 2 doses so far and her next dose is in a little over a week.  She continues to get migraines 15+ migraine days per month.      Past Medical History:   Diagnosis Date   • Anxiety    • Arthritis    • Headache         Family History   Problem Relation Age of Onset   • Thyroid disease Mother    • Depression Father    • Anxiety disorder Father    • Alcohol abuse Father    • Thyroid disease Maternal Aunt    • Asthma Maternal Aunt    • Thyroid disease Maternal Grandmother    • Anxiety disorder Paternal Grandmother    • Depression  Paternal Grandmother    • Alcohol abuse Paternal Grandmother    • Dementia Paternal Grandmother    • Anxiety disorder Paternal Grandfather    • Depression Paternal Grandfather    • Alcohol abuse Paternal Grandfather    • No Known Problems Maternal Grandfather    • Alcohol abuse Paternal Uncle    • Anxiety disorder Daughter    • Depression Daughter         Social History     Socioeconomic History   • Marital status: Single     Spouse name: Not on file   • Number of children: 2   • Years of education: Not on file   • Highest education level: Not on file   Tobacco Use   • Smoking status: Former Smoker     Packs/day: 0.75     Years: 14.00     Pack years: 10.50     Quit date: 4/27/2011     Years since quitting: 10.1   • Smokeless tobacco: Never Used   Vaping Use   • Vaping Use: Never used   Substance and Sexual Activity   • Alcohol use: Yes     Comment: 1-2 times a week, 1-2 glasses at a time.    • Drug use: Not Currently     Comment: cocaine in late teens   • Sexual activity: Yes     Partners: Male     Birth control/protection: I.U.D.        I have personally reviewed the ROS as stated below.     Review of Systems     Constitutional: Positive for activity change, appetite change and fatigue.   HENT: Positive for hearing loss, trouble swallowing sometimes and voice change.    Respiratory: Positive for shortness of breath. Negative for cough and wheezing.    Cardiovascular: Positive for little chest pain. Negative for palpitations and leg swelling.   Gastrointestinal: Positive for abdominal pain. Negative for nausea and vomiting.   Endocrine: Negative for cold intolerance and heat intolerance.   Genitourinary: Positive for urgency. Negative for decreased urine volume and urinary incontinence.   Musculoskeletal: Positive for back pain and neck pain. Negative for gait problem.   Allergic/Immunologic: Positive for environmental allergies. Negative for food allergies.   Neurological: Positive for dizziness, light-headedness  and headache. Negative for tremors, seizures, syncope, facial asymmetry, speech difficulty, weakness, numbness, memory problem and confusion.   Psychiatric/Behavioral: Positive for decreased concentration, positive for hyperactivity and depressed mood. Negative for agitation, behavioral problems, dysphoric mood, hallucinations, self-injury, sleep disturbance, suicidal ideas and stress. The patient is nervous/anxious.      Objective   Vital Signs:   There were no vitals taken for this visit. No vitals as this is a video visit.    PHYSICAL EXAM:    General   Mental Status - Alert. General Appearance - Light dimmed due to light sensitivity from migraines.  Well developed, Well groomed, Oriented and Cooperative. Orientation - Oriented X3.       Head and Neck  Head - normocephalic, atraumatic with no lesions or palpable masses.  Neck    Global Assessment - supple.       Eye   Sclera/Conjunctiva - Bilateral - Normal.    ENMT  Mouth and Throat   Oral Cavity/Oropharynx: Oropharynx - the soft palate,uvula and tongue are normal in appearance.    Neurologic   Mental Status: Speech - Normal. Cognitive function - appropriate fund of knowledge. No impairment of attention, Impairment of concentration, impairment of long term memory or impairment of short term memory.  Cranial Nerves:   II Optic: Visual acuity - Left - Normal. Right - Normal. Visual fields - Normal (to confrontation).  III Oculomotor: Pupillary constriction - Left - Normal. Right - Normal.  VII Facial: - Normal Bilaterally.  VIII Acoustic - Bilateral - Hearing normal and (Hearing tested by finger rub).   IX Glossopharyngeal / X Vagus - Normal.  XI Accessory: Trapezius - Bilateral - Normal. Sternocleidomastoid - Bilateral - Normal.  XII Hypoglossal - Bilateral - Normal.  Eye Movements: - Normal Bilaterally.  Sensory:   Light Touch: Intact - Globally.  Motor:   Bulk and Contour: - Normal.  Tremor: Not present.  Strength: 5/5 normal muscle strength - All  Muscles. Coordination - No Impairment of finger-to-nose or Impairment of rapid alternating movements. Gait - Normal.       Result Review :                 Assessment and Plan    Problem List Items Addressed This Visit        Neuro    Chronic migraine without aura without status migrainosus, not intractable - Primary    Current Assessment & Plan     41 year old woman with chronic migraine headaches with occipital neuralgia component on examination.  Normal neurological examination otherwise without any increased reflexes or focality on prior exam in person and no focality to exam on evaluation today.  I reviewed her brain MRI scan independently on her initial visit and agree with radiology read of nonspecific white matter changes which can be related to migraines.  She has discontinued the topiramate which was not helping and multiple antidepressants have been used which also did not help and continues to have daily headaches and 15+ migraine days per week.  She is also concerned about possible kidney stones with the topiramate so I recommended discontinuing this medicine at this time.  I would not try TCAs due to being on antidepressants.  I will also not try beta-blockers as her BP is low on prior visit.  I advised her to drink lots of fluids.  I started her on CGRP medication Emgality for migraine prevention which she has not noticed much assistance and has had 2 injections thus far, I advised her to take the third injection to see if this helps further following the third injection and also tried to set her up for ONB as she has tenderness to palpation in the occipital region over the greater occipital nerves bilaterally but insurance denied this medicine.  Will continue the Maxalt which seems to be helping better than the sumatriptan for acute treatment.  Discussed potential side effects including risk for serotonin syndrome.  I discussed migraine triggers and lifestyle modifications at length and provided  patient education information today.  Will follow up in 1 month and if Emgality is not helping will try Botox injections.               Relevant Medications    rizatriptan (Maxalt) 10 MG tablet        This was a video visit utilizing both audio and video with patient at home and neurologist in office.  I spent 30 minutes of time in care for patient today in follow up care.     Follow Up   No follow-ups on file.  Patient was given instructions and counseling regarding her condition or for health maintenance advice. Please see specific information pulled into the AVS if appropriate.

## 2021-06-23 ENCOUNTER — TELEPHONE (OUTPATIENT)
Dept: NEUROLOGY | Facility: CLINIC | Age: 41
End: 2021-06-23

## 2021-06-23 ENCOUNTER — CLINICAL SUPPORT (OUTPATIENT)
Dept: NEUROLOGY | Facility: CLINIC | Age: 41
End: 2021-06-23

## 2021-06-23 DIAGNOSIS — G43.709 CHRONIC MIGRAINE WITHOUT AURA WITHOUT STATUS MIGRAINOSUS, NOT INTRACTABLE: Primary | ICD-10-CM

## 2021-06-23 PROCEDURE — 96372 THER/PROPH/DIAG INJ SC/IM: CPT | Performed by: PSYCHIATRY & NEUROLOGY

## 2021-06-23 RX ORDER — KETOROLAC TROMETHAMINE 30 MG/ML
30 INJECTION, SOLUTION INTRAMUSCULAR; INTRAVENOUS ONCE
Status: COMPLETED | OUTPATIENT
Start: 2021-06-23 | End: 2021-06-23

## 2021-06-23 RX ORDER — ONDANSETRON 2 MG/ML
4 INJECTION INTRAMUSCULAR; INTRAVENOUS ONCE
Status: COMPLETED | OUTPATIENT
Start: 2021-06-23 | End: 2021-06-23

## 2021-06-23 RX ADMIN — ONDANSETRON 4 MG: 2 INJECTION INTRAMUSCULAR; INTRAVENOUS at 15:34

## 2021-06-23 RX ADMIN — KETOROLAC TROMETHAMINE 30 MG: 30 INJECTION, SOLUTION INTRAMUSCULAR; INTRAVENOUS at 15:33

## 2021-06-23 NOTE — TELEPHONE ENCOUNTER
Provider: PENNY  Caller: PT  Relationship to Patient: SELF  Pharmacy: [facility]  Phone Number: [phone]  Reason for Call: PT IS SUFFERING FROM A HORRIBLE MIGRAINE; PT HAD TELEHEALTH VISIT W/PROVIDER ON 6/22/21 AND WAS TOLD TO CALL BACK IF MIGRAINE BECAME WORSE TO DISCUSS NEXT STEPS TO TAKE.    PLEASE CALL PT & ADVISE.    THANK YOU.

## 2021-06-23 NOTE — TELEPHONE ENCOUNTER
Called spoke with Dr. Mackenzie what his plan is for pt, as her last note said gagan for a ONB and have her take her emgality inj. He suggested her coming in for a toradol inj. IVU. Called pt notified her she vu and plans on being here by 330

## 2021-06-24 ENCOUNTER — OFFICE VISIT (OUTPATIENT)
Dept: INTERNAL MEDICINE | Facility: CLINIC | Age: 41
End: 2021-06-24

## 2021-06-24 VITALS
TEMPERATURE: 98 F | DIASTOLIC BLOOD PRESSURE: 62 MMHG | BODY MASS INDEX: 33.11 KG/M2 | SYSTOLIC BLOOD PRESSURE: 94 MMHG | WEIGHT: 206 LBS | HEART RATE: 72 BPM | OXYGEN SATURATION: 98 % | HEIGHT: 66 IN

## 2021-06-24 DIAGNOSIS — E66.9 CLASS 1 OBESITY WITHOUT SERIOUS COMORBIDITY WITH BODY MASS INDEX (BMI) OF 33.0 TO 33.9 IN ADULT, UNSPECIFIED OBESITY TYPE: ICD-10-CM

## 2021-06-24 DIAGNOSIS — F41.9 ANXIETY: Primary | ICD-10-CM

## 2021-06-24 PROBLEM — E66.811 CLASS 1 OBESITY IN ADULT: Status: ACTIVE | Noted: 2021-06-24

## 2021-06-24 PROCEDURE — 99213 OFFICE O/P EST LOW 20 MIN: CPT | Performed by: NURSE PRACTITIONER

## 2021-06-24 RX ORDER — PHENTERMINE HYDROCHLORIDE 37.5 MG/1
37.5 TABLET ORAL EVERY MORNING
COMMUNITY
Start: 2021-05-27 | End: 2021-06-29 | Stop reason: SDUPTHER

## 2021-06-24 NOTE — PROGRESS NOTES
Subjective   Dianna Clay is a 41 y.o. female.      History of Present Illness   The patient is here today to F/U on obesity, pt was started on phentermine 6/24th, initial wt 209, now down 3 lbs. She was down 12 lbs but is up due to stress. Tracking her food but forgot to bring.     Migraine the last few days.   Taking whole tab of phentermine, has held it the last few days due to migraine.     Dad with cirrhosis, this has worsened, poss mass. This has been really stressful.   Dtr 14 with Dad X7 months, hearing coming up. Just started family therapy. [facility] dtrs have been giving her a hard time.   They close on her house tomorrow.   The following portions of the patient's history were reviewed and updated as appropriate: allergies, current medications, past family history, past medical history, past social history, past surgical history and problem list.    Review of Systems   Constitutional: Negative for chills and fever.   Respiratory: Negative.    Cardiovascular: Negative.    Psychiatric/Behavioral: Positive for depressed mood (intermittent). Negative for dysphoric mood and suicidal ideas. The patient is nervous/anxious.        Objective   Physical Exam  Constitutional:       Appearance: Normal appearance. She is well-developed.   Neck:      Thyroid: No thyromegaly.   Cardiovascular:      Rate and Rhythm: Normal rate and regular rhythm.      Heart sounds: Normal heart sounds.   Pulmonary:      Effort: Pulmonary effort is normal.      Breath sounds: Normal breath sounds.   Musculoskeletal:      Cervical back: Normal range of motion and neck supple.   Lymphadenopathy:      Cervical: No cervical adenopathy.   Skin:     General: Skin is warm and dry.   Neurological:      Mental Status: She is alert.   Psychiatric:         Behavior: Behavior normal.         Thought Content: Thought content normal.         Judgment: Judgment normal.         Vitals:    06/24/21 1629   BP: 94/62   Pulse: 72   Temp: 98 °F (36.7 °C)    SpO2: 98%     Body mass index is 33.27 kg/m².      Assessment/Plan   There are no diagnoses linked to this encounter.           1. Anxiety- seeing therapist, continue with this  2. Obesity- return to healthy diet and tracking, return to , continue phentermine.

## 2021-06-29 DIAGNOSIS — E66.9 CLASS 1 OBESITY WITHOUT SERIOUS COMORBIDITY WITH BODY MASS INDEX (BMI) OF 33.0 TO 33.9 IN ADULT, UNSPECIFIED OBESITY TYPE: Primary | ICD-10-CM

## 2021-06-29 RX ORDER — PHENTERMINE HYDROCHLORIDE 37.5 MG/1
37.5 TABLET ORAL EVERY MORNING
Qty: 30 TABLET | Refills: 0 | Status: SHIPPED | OUTPATIENT
Start: 2021-06-29 | End: 2021-07-27 | Stop reason: SDUPTHER

## 2021-07-27 ENCOUNTER — OFFICE VISIT (OUTPATIENT)
Dept: INTERNAL MEDICINE | Facility: CLINIC | Age: 41
End: 2021-07-27

## 2021-07-27 VITALS
WEIGHT: 201 LBS | TEMPERATURE: 98 F | OXYGEN SATURATION: 98 % | DIASTOLIC BLOOD PRESSURE: 72 MMHG | HEART RATE: 89 BPM | BODY MASS INDEX: 32.3 KG/M2 | SYSTOLIC BLOOD PRESSURE: 108 MMHG | HEIGHT: 66 IN

## 2021-07-27 DIAGNOSIS — E66.9 CLASS 1 OBESITY WITHOUT SERIOUS COMORBIDITY WITH BODY MASS INDEX (BMI) OF 33.0 TO 33.9 IN ADULT, UNSPECIFIED OBESITY TYPE: ICD-10-CM

## 2021-07-27 DIAGNOSIS — E66.9 CLASS 1 OBESITY WITHOUT SERIOUS COMORBIDITY WITH BODY MASS INDEX (BMI) OF 32.0 TO 32.9 IN ADULT, UNSPECIFIED OBESITY TYPE: ICD-10-CM

## 2021-07-27 DIAGNOSIS — F41.9 ANXIETY: Primary | ICD-10-CM

## 2021-07-27 PROCEDURE — 99214 OFFICE O/P EST MOD 30 MIN: CPT | Performed by: NURSE PRACTITIONER

## 2021-07-27 RX ORDER — PHENTERMINE HYDROCHLORIDE 37.5 MG/1
37.5 TABLET ORAL EVERY MORNING
Qty: 30 TABLET | Refills: 0 | Status: SHIPPED | OUTPATIENT
Start: 2021-07-27 | End: 2022-07-05

## 2021-07-27 NOTE — PROGRESS NOTES
Subjective   Dianna Clay is a 41 y.o. female.      History of Present Illness   The patient is here today to F/U on anxiety and obesity, pt was started on phentermine 6/24th, initial wt 209, down 5 lbs from last visit. She started a nutrition plan called luci. This was started yesterday.   Tolerating phentermine.     Dad with cirrhosis, this has worsened, poss mass. This has been really stressful.   Dtr 14 with Dad X7 months, hearing coming up. Just started family therapy. [facility] dtrs have been giving her a hard time.  has moved out of her house and in to the new house that is not ready yet. He may have a heart blockage.   The following portions of the patient's history were reviewed and updated as appropriate: allergies, current medications, past family history, past medical history, past social history, past surgical history and problem list.    Review of Systems   Constitutional: Negative for chills and fever.   Respiratory: Negative.    Cardiovascular: Negative.    Psychiatric/Behavioral: Negative for dysphoric mood and suicidal ideas. The patient is nervous/anxious.        Objective   Physical Exam  Constitutional:       Appearance: Normal appearance. She is well-developed.   Neck:      Thyroid: No thyromegaly.   Cardiovascular:      Rate and Rhythm: Normal rate and regular rhythm.      Heart sounds: Normal heart sounds.   Pulmonary:      Effort: Pulmonary effort is normal.      Breath sounds: Normal breath sounds.   Musculoskeletal:      Cervical back: Normal range of motion and neck supple.   Lymphadenopathy:      Cervical: No cervical adenopathy.   Skin:     General: Skin is warm and dry.   Neurological:      Mental Status: She is alert.   Psychiatric:         Behavior: Behavior normal.         Thought Content: Thought content normal.         Judgment: Judgment normal.         Vitals:    07/27/21 1118   BP: 108/72   Pulse: 89   Temp: 98 °F (36.7 °C)   SpO2: 98%     Body mass index is 32.69  kg/m².      Assessment/Plan   Diagnoses and all orders for this visit:    1. Anxiety (Primary)    2. Class 1 obesity without serious comorbidity with body mass index (BMI) of 32.0 to 32.9 in adult, unspecified obesity type    3. Class 1 obesity without serious comorbidity with body mass index (BMI) of 33.0 to 33.9 in adult, unspecified obesity type  -     phentermine (ADIPEX-P) 37.5 MG tablet; Take 1 tablet by mouth Every Morning.  Dispense: 30 tablet; Refill: 0               1. Anxiety- continue to work with therapist  2. Obesity- continue new nutrition plan, phentermine, therapy, exercise

## 2021-07-29 DIAGNOSIS — L70.9 ACNE, UNSPECIFIED ACNE TYPE: ICD-10-CM

## 2021-07-29 RX ORDER — TRAZODONE HYDROCHLORIDE 100 MG/1
TABLET ORAL
Qty: 90 TABLET | Refills: 1 | Status: SHIPPED | OUTPATIENT
Start: 2021-07-29 | End: 2022-04-26

## 2021-07-29 RX ORDER — MINOCYCLINE HYDROCHLORIDE 100 MG/1
100 CAPSULE ORAL DAILY
Qty: 60 CAPSULE | Refills: 0 | Status: SHIPPED | OUTPATIENT
Start: 2021-07-29 | End: 2021-12-30

## 2021-08-03 ENCOUNTER — TELEPHONE (OUTPATIENT)
Dept: NEUROLOGY | Facility: CLINIC | Age: 41
End: 2021-08-03

## 2021-08-03 ENCOUNTER — OFFICE VISIT (OUTPATIENT)
Dept: NEUROLOGY | Facility: CLINIC | Age: 41
End: 2021-08-03

## 2021-08-03 VITALS
SYSTOLIC BLOOD PRESSURE: 110 MMHG | HEIGHT: 66 IN | WEIGHT: 201 LBS | BODY MASS INDEX: 32.3 KG/M2 | DIASTOLIC BLOOD PRESSURE: 70 MMHG | HEART RATE: 82 BPM | OXYGEN SATURATION: 98 %

## 2021-08-03 DIAGNOSIS — G43.709 CHRONIC MIGRAINE WITHOUT AURA WITHOUT STATUS MIGRAINOSUS, NOT INTRACTABLE: Primary | ICD-10-CM

## 2021-08-03 PROCEDURE — 99214 OFFICE O/P EST MOD 30 MIN: CPT | Performed by: PSYCHIATRY & NEUROLOGY

## 2021-08-03 RX ORDER — RIZATRIPTAN BENZOATE 10 MG/1
10 TABLET ORAL ONCE AS NEEDED
Qty: 12 TABLET | Refills: 2 | Status: SHIPPED | OUTPATIENT
Start: 2021-08-03 | End: 2021-09-24 | Stop reason: SDUPTHER

## 2021-08-03 NOTE — PROGRESS NOTES
Chief Complaint  Migraine    Subjective          Dianna Clay presents to [facility]UROLOG[facility] for   HISTORY OF PRESENT ILLNESS:    Dianna Clay is a 41 year old right handed woman who returns to neurology clinic for follow up evaluation and treatment of chronic migraines.  She reports her migraines starting about 15+ years ago and were under control and about 7+ months ago they started getting worse.  Her migraines are located in the back of her head and also front of her head with throbbing quality which she rates as 8-10/10 on pain scale 1-10 with associated light and sound sensitivity and nausea and vomiting at times.  Headaches typically last a couple days and she feels like she has had a constant daily headache over the past 7+ months.  She has tried topiramate 200mg daily which initially helped but had not helped more recently and due to having history of kidney stones this was discontinued.  She was previously on Prozac which has been discontinued and she continues to take bupropion currently.  She has had increased stress in her life.  She also lost her dog.  She has tried Imitrex and Fioricet which she does not think was helpful.  Maxalt seems to be helping better.  She denies family history of migraines.  She had a brain MRI scan done on 4/14/2021 which looks good with some nonspecific white matter changes which can be seen with migraines.  I started her on Emgality following initial visit and she has had 4 doses so far and has not noticed any improvement in her migraines.  She continues to get migraines 15+ migraine days per month.  Occipital nerve blocks were denied by her insurance company.      Past Medical History:   Diagnosis Date   • Anxiety    • Arthritis    • Headache         Family History   Problem Relation Age of Onset   • Thyroid disease Mother    • Depression Father    • Anxiety disorder Father    • Alcohol abuse Father    • Thyroid disease Maternal Aunt    • Asthma  "Maternal Aunt    • Thyroid disease Maternal Grandmother    • Anxiety disorder Paternal Grandmother    • Depression Paternal Grandmother    • Alcohol abuse Paternal Grandmother    • Dementia Paternal Grandmother    • Anxiety disorder Paternal Grandfather    • Depression Paternal Grandfather    • Alcohol abuse Paternal Grandfather    • No Known Problems Maternal Grandfather    • Alcohol abuse Paternal Uncle    • Anxiety disorder Daughter    • Depression Daughter         Social History     Socioeconomic History   • Marital status: Single     Spouse name: Not on file   • Number of children: 2   • Years of education: Not on file   • Highest education level: Not on file   Tobacco Use   • Smoking status: Former Smoker     Packs/day: 0.75     Years: 14.00     Pack years: 10.50     Quit date: 4/27/2011     Years since quitting: 10.2   • Smokeless tobacco: Never Used   Vaping Use   • Vaping Use: Never used   Substance and Sexual Activity   • Alcohol use: Yes     Comment: 1-2 times a week, 1-2 glasses at a time.    • Drug use: Not Currently     Comment: cocaine in late teens   • Sexual activity: Yes     Partners: Male     Birth control/protection: I.U.D.        I have personally reviewed the ROS as stated below.     Review of Systems   Neurological: Positive for headache. Negative for dizziness, tremors, seizures, syncope, facial asymmetry, speech difficulty, weakness, light-headedness, numbness, memory problem and confusion.   Hematological: Does not bruise/bleed easily.   Psychiatric/Behavioral: Negative for agitation, behavioral problems, decreased concentration, dysphoric mood, hallucinations, self-injury, sleep disturbance, suicidal ideas, negative for hyperactivity, depressed mood and stress. The patient is nervous/anxious.         Objective   Vital Signs:   /70 (BP Location: Left arm)   Pulse 82   Ht 167 cm (65.75\")   Wt 91.2 kg (201 lb)   SpO2 98%   BMI 32.69 kg/m²       PHYSICAL EXAM:    General   Mental " Status - Alert. General Appearance - Well developed, Well groomed, Oriented and Cooperative. Orientation - Oriented X3.       Head and Neck  Head - normocephalic, atraumatic with no lesions or palpable masses.  Neck    Global Assessment - supple.       Eye   Sclera/Conjunctiva - Bilateral - Normal.    ENMT  Mouth and Throat   Oral Cavity/Oropharynx: Oropharynx - the soft palate,uvula and tongue are normal in appearance.    Chest and Lung Exam   Chest - lung clear to auscultation bilaterally.    Cardiovascular   Cardiovascular examination reveals  - normal heart sounds, regular rate and rhythm.    Neurologic   Mental Status: Speech - Normal. Cognitive function - appropriate fund of knowledge. No impairment of attention, Impairment of concentration, impairment of long term memory or impairment of short term memory.  Cranial Nerves:   II Optic: Visual acuity - Left - Normal. Right - Normal. Visual fields - Normal (to confrontation).  III Oculomotor: Pupillary constriction - Left - Normal. Right - Normal.  VII Facial: - Normal Bilaterally.  VIII Acoustic - Bilateral - Hearing normal and (Hearing tested by finger rub).   IX Glossopharyngeal / X Vagus - Normal.  XI Accessory: Trapezius - Bilateral - Normal. Sternocleidomastoid - Bilateral - Normal.  XII Hypoglossal - Bilateral - Normal.  Eye Movements: - Normal Bilaterally.  Sensory:   Light Touch: Intact - Globally.  Motor:   Bulk and Contour: - Normal.  Tone: - Normal.  Tremor: Not present.  Strength: 5/5 normal muscle strength - All Muscles.   General Assessment of Reflexes: - deep tendon reflexes are normal. Coordination - No Impairment of finger-to-nose or Impairment of rapid alternating movements. Gait - Normal.       Result Review :                 Assessment and Plan    Problem List Items Addressed This Visit        Neuro    Chronic migraine without aura without status migrainosus, not intractable - Primary    Current Assessment & Plan     41 year old woman with  chronic medically refractory migraine headaches with occipital neuralgia component on examination which has progressed with continued 15+ migraine days per month.  Normal neurological examination otherwise today.  Brain MRI with nonspecific white matter changes which can be related to migraines.  She has discontinued the topiramate which was not helping and multiple antidepressants have been used which also did not help and continues to have daily headaches and 15+ migraine days per week.  She is also concerned about possible kidney stones with the topiramate so we discontinued this medicine.  I would not try TCAs due to being on antidepressants.  I will also not try beta-blockers as her BP is low on visit today.  I advised her to drink lots of fluids.  I started her on CGRP medication Emgality for migraine prevention which she has not noticed much assistance and has had 4 injections thus far and I tried to set her up for ONB as she has tenderness to palpation in the occipital region over the greater occipital nerves bilaterally but insurance denied this medicine.  Will continue the Maxalt which seems to be helping better than the sumatriptan for acute treatment.  Will also start PA for Botox injections for chronic medically refractory migraine headaches at this time.  Discussed potential side effects including risk for serotonin syndrome.  I discussed migraine triggers and lifestyle modifications at length and provided patient education information today.  Will follow up in 2-3 months and sooner for Botox injections once approved.             Relevant Medications    rizatriptan (Maxalt) 10 MG tablet              Follow Up   Return in about 3 months (around 11/3/2021).  Patient was given instructions and counseling regarding her condition or for health maintenance advice. Please see specific information pulled into the AVS if appropriate.

## 2021-08-03 NOTE — ASSESSMENT & PLAN NOTE
41 year old woman with chronic medically refractory migraine headaches with occipital neuralgia component on examination which has progressed with continued 15+ migraine days per month.  Normal neurological examination otherwise today.  Brain MRI with nonspecific white matter changes which can be related to migraines.  She has discontinued the topiramate which was not helping and multiple antidepressants have been used which also did not help and continues to have daily headaches and 15+ migraine days per week.  She is also concerned about possible kidney stones with the topiramate so we discontinued this medicine.  I would not try TCAs due to being on antidepressants.  I will also not try beta-blockers as her BP is low on visit today.  I advised her to drink lots of fluids.  I started her on CGRP medication Emgality for migraine prevention which she has not noticed much assistance and has had 4 injections thus far and I tried to set her up for ONB as she has tenderness to palpation in the occipital region over the greater occipital nerves bilaterally but insurance denied this medicine.  Will continue the Maxalt which seems to be helping better than the sumatriptan for acute treatment.  Will also start PA for Botox injections for chronic medically refractory migraine headaches at this time.  Discussed potential side effects including risk for serotonin syndrome.  I discussed migraine triggers and lifestyle modifications at length and provided patient education information today.  Will follow up in 2-3 months and sooner for Botox injections once approved.

## 2021-08-05 RX ORDER — FLUCONAZOLE 100 MG/1
150 TABLET ORAL DAILY
Qty: 1 TABLET | Refills: 0 | Status: SHIPPED | OUTPATIENT
Start: 2021-08-05 | End: 2021-08-06

## 2021-08-25 ENCOUNTER — TELEPHONE (OUTPATIENT)
Dept: NEUROLOGY | Facility: CLINIC | Age: 41
End: 2021-08-25

## 2021-08-25 ENCOUNTER — PROCEDURE VISIT (OUTPATIENT)
Dept: NEUROLOGY | Facility: CLINIC | Age: 41
End: 2021-08-25

## 2021-08-25 DIAGNOSIS — G43.709 CHRONIC MIGRAINE WITHOUT AURA WITHOUT STATUS MIGRAINOSUS, NOT INTRACTABLE: Primary | ICD-10-CM

## 2021-08-25 DIAGNOSIS — G43.009 MIGRAINE WITHOUT AURA AND WITHOUT STATUS MIGRAINOSUS, NOT INTRACTABLE: ICD-10-CM

## 2021-08-25 PROCEDURE — 64615 CHEMODENERV MUSC MIGRAINE: CPT | Performed by: PSYCHIATRY & NEUROLOGY

## 2021-08-25 NOTE — TELEPHONE ENCOUNTER
Provider: DR FRANCIS    Caller: BRIDGETT    Reason for Call: BRIDGETT IS WANTING TO SPEAK TO SOMEONE REGARDING TO GETTING [employer] ENROLLED WITH THE MANUFACTOR OF THE PATIENT ASSISTANCE PROGRAM TO BE ABLE TO GET HER BOTOX PAID FOR.  THEY HAVE BEEN UNABLE TO REACH [employer].      PLEASE CALL BRIDGETT AGUIRRE @ [phone] ext [phone]

## 2021-08-25 NOTE — PROGRESS NOTES
Botox injection: Botox injection for neuromuscular block.  Indication: Chronic migraines.  Risks, benefits and alternatives were discussed with the patient.  EMG guidance was not used in identifying the injection site.  Procerus: 5 units was injected.  : 5 units was injected on the right and 5 units injected on the left.  Frontalis: 10 units injected on the right and 10 units injected on the left.  Temporalis: 20 units injected on the right and 20 units injected on the left.  Occipitalis: 15 units injected on the right and 15 units injected on the left.  Cervical paraspinal: 10 units injected on the right and 10 units injected on the left.  Trapezius: 15 units injected on the right and 15 units injected on the left.  200 unit vial, 1 vials, 45 wasted and 155 used.  The patient tolerated the procedure well.  There were no complications.  Patient instructions: The patient was instructed that they may experience localized discomfort over the next 12 to 24 hours and may take over the counter pain medication if needed.  Follow-up in the office in 3 months for next Botox injection.    Buy and bill

## 2021-09-24 ENCOUNTER — OFFICE VISIT (OUTPATIENT)
Dept: NEUROLOGY | Facility: CLINIC | Age: 41
End: 2021-09-24

## 2021-09-24 VITALS
OXYGEN SATURATION: 98 % | HEIGHT: 66 IN | DIASTOLIC BLOOD PRESSURE: 70 MMHG | SYSTOLIC BLOOD PRESSURE: 110 MMHG | BODY MASS INDEX: 32.14 KG/M2 | WEIGHT: 200 LBS | HEART RATE: 78 BPM

## 2021-09-24 DIAGNOSIS — G43.709 CHRONIC MIGRAINE WITHOUT AURA WITHOUT STATUS MIGRAINOSUS, NOT INTRACTABLE: Primary | ICD-10-CM

## 2021-09-24 PROCEDURE — 99213 OFFICE O/P EST LOW 20 MIN: CPT | Performed by: PSYCHIATRY & NEUROLOGY

## 2021-09-24 RX ORDER — RIZATRIPTAN BENZOATE 10 MG/1
10 TABLET ORAL ONCE AS NEEDED
Qty: 12 TABLET | Refills: 2 | Status: SHIPPED | OUTPATIENT
Start: 2021-09-24 | End: 2021-12-28 | Stop reason: SDUPTHER

## 2021-09-24 NOTE — ASSESSMENT & PLAN NOTE
41 year old woman with chronic medically refractory migraine headaches with occipital neuralgia component on examination which had progressed with continued 15+ migraine days per month which has responded well with Botox down to 2 migraines per month with more than 50% reduction in headaches.  Normal neurological examination otherwise today.  Brain MRI with nonspecific white matter changes which can be related to migraines.  I will continue her on Botox injections for migraine prevention and continue the Maxalt which is helping with her migraines.  Discussed potential side effects including risk for serotonin syndrome.  I discussed migraine triggers and lifestyle modifications at length and provided patient education information today.  Will follow up in 2-3 months and sooner for Botox injections.

## 2021-09-24 NOTE — PROGRESS NOTES
Chief Complaint  Headache (botox  has helped --)    Subjective          Dianna Clay presents to [facility] NEUROLOGY for   HISTORY OF PRESENT ILLNESS:    Dianna Clay is a 41 year old right handed woman who returns to neurology clinic for follow up evaluation and treatment of chronic migraines.  She reports her migraines starting about 15+ years ago and were under control and about 7+ months ago they started getting worse.  Her migraines are located in the back of her head and also front of her head with throbbing quality which she rates as 8-10/10 on pain scale 1-10 with associated light and sound sensitivity and nausea and vomiting at times.  Headaches typically last a couple days and she feels like she has had a constant daily headache over the past 7+ months.  She has tried topiramate 200mg daily which initially helped but had not helped more recently and due to having history of kidney stones this was discontinued.  She was previously on Prozac which has been discontinued and she continues to take bupropion currently.  She has had increased stress in her life.  She also lost her dog.  She has tried Imitrex and Fioricet which she does not think was helpful.  Maxalt seems to be helping better.  She denies family history of migraines.  She had a brain MRI scan done on 4/14/2021 which looks good with some nonspecific white matter changes which can be seen with migraines.  I started her on Emgality following initial visit and she has had 4 doses so far and has not noticed any improvement in her migraines.  She continued to get migraines 15+ migraine days per month and Occipital nerve blocks were denied by her insurance company.  Instead I tried her on Botox injections for migraines which she reports has been very helpful and she is getting maybe 2 migraines per week since her first set of injections which is a greater than 50% reduction.      Past Medical History:   Diagnosis Date   •  Anxiety    • Arthritis    • Headache         Family History   Problem Relation Age of Onset   • Thyroid disease Mother    • Depression Father    • Anxiety disorder Father    • Alcohol abuse Father    • Thyroid disease Maternal Aunt    • Asthma Maternal Aunt    • Thyroid disease Maternal Grandmother    • Anxiety disorder Paternal Grandmother    • Depression Paternal Grandmother    • Alcohol abuse Paternal Grandmother    • Dementia Paternal Grandmother    • Anxiety disorder Paternal Grandfather    • Depression Paternal Grandfather    • Alcohol abuse Paternal Grandfather    • No Known Problems Maternal Grandfather    • Alcohol abuse Paternal Uncle    • Anxiety disorder Daughter    • Depression Daughter         Social History     Socioeconomic History   • Marital status: Single     Spouse name: Not on file   • Number of children: 2   • Years of education: Not on file   • Highest education level: Not on file   Tobacco Use   • Smoking status: Former Smoker     Packs/day: 0.75     Years: 14.00     Pack years: 10.50     Quit date: 4/27/2011     Years since quitting: 10.4   • Smokeless tobacco: Never Used   Vaping Use   • Vaping Use: Never used   Substance and Sexual Activity   • Alcohol use: Yes     Comment: 1-2 times a week, 1-2 glasses at a time.    • Drug use: Not Currently     Comment: cocaine in late teens   • Sexual activity: Yes     Partners: Male     Birth control/protection: I.U.D.        I have personally reviewed the ROS as stated below.     Review of Systems   Eyes: Negative for blurred vision, double vision and pain.   Gastrointestinal: Positive for nausea. Negative for vomiting.   Neurological: Positive for headache (last HA on Tuesday). Negative for dizziness, tremors, seizures, syncope, facial asymmetry, speech difficulty, weakness, light-headedness, numbness, memory problem and confusion.   Psychiatric/Behavioral: Negative for agitation, behavioral problems, decreased concentration, dysphoric mood,  "hallucinations, self-injury, sleep disturbance, suicidal ideas, negative for hyperactivity, depressed mood and stress. The patient is not nervous/anxious.         Objective   Vital Signs:   /70   Pulse 78   Ht 167 cm (65.75\")   Wt 90.7 kg (200 lb)   SpO2 98%   BMI 32.53 kg/m²       PHYSICAL EXAM:    General   Mental Status - Alert. General Appearance - Well developed, Well groomed, Oriented and Cooperative. Orientation - Oriented X3.       Head and Neck  Head - normocephalic, atraumatic with no lesions or palpable masses.  Neck    Global Assessment - supple.       Eye   Sclera/Conjunctiva - Bilateral - Normal.    ENMT  Mouth and Throat   Oral Cavity/Oropharynx: Oropharynx - the soft palate,uvula and tongue are normal in appearance.    Chest and Lung Exam   Chest - lung clear to auscultation bilaterally.    Cardiovascular   Cardiovascular examination reveals  - normal heart sounds, regular rate and rhythm.    Neurologic   Mental Status: Speech - Normal. Cognitive function - appropriate fund of knowledge. No impairment of attention, Impairment of concentration, impairment of long term memory or impairment of short term memory.  Cranial Nerves:   II Optic: Visual acuity - Left - Normal. Right - Normal. Visual fields - Normal (to confrontation).  III Oculomotor: Pupillary constriction - Left - Normal. Right - Normal.  VII Facial: - Normal Bilaterally.  VIII Acoustic - Bilateral - Hearing normal and (Hearing tested by finger rub).   IX Glossopharyngeal / X Vagus - Normal.  XI Accessory: Trapezius - Bilateral - Normal. Sternocleidomastoid - Bilateral - Normal.  XII Hypoglossal - Bilateral - Normal.  Eye Movements: - Normal Bilaterally.  Sensory:   Light Touch: Intact - Globally.  Motor:   Bulk and Contour: - Normal.  Tone: - Normal.  Tremor: Not present.  Strength: 5/5 normal muscle strength - All Muscles.   General Assessment of Reflexes: - deep tendon reflexes are normal. Coordination - No Impairment of " finger-to-nose or Impairment of rapid alternating movements. Gait - Normal.       Result Review :                 Assessment and Plan    Problem List Items Addressed This Visit        Neuro    Chronic migraine without aura without status migrainosus, not intractable - Primary    Current Assessment & Plan     41 year old woman with chronic medically refractory migraine headaches with occipital neuralgia component on examination which had progressed with continued 15+ migraine days per month which has responded well with Botox down to 2 migraines per month with more than 50% reduction in headaches.  Normal neurological examination otherwise today.  Brain MRI with nonspecific white matter changes which can be related to migraines.  I will continue her on Botox injections for migraine prevention and continue the Maxalt which is helping with her migraines.  Discussed potential side effects including risk for serotonin syndrome.  I discussed migraine triggers and lifestyle modifications at length and provided patient education information today.  Will follow up in 2-3 months and sooner for Botox injections.             Relevant Medications    rizatriptan (Maxalt) 10 MG tablet              Follow Up   Return in about 3 months (around 12/24/2021).  Patient was given instructions and counseling regarding her condition or for health maintenance advice. Please see specific information pulled into the AVS if appropriate.

## 2021-10-26 ENCOUNTER — TELEPHONE (OUTPATIENT)
Dept: NEUROLOGY | Facility: CLINIC | Age: 41
End: 2021-10-26

## 2021-10-26 NOTE — TELEPHONE ENCOUNTER
Caller: KATERIN    [facility] ASSIST FOR BOTOX    Best call back number: [phone]  OPTION 1, 4, 3    THIS IS IN REGARDS TO PT ASSISTANCE FOR BOTOX INJECTIONS    KATERIN STATED THEY NEED FROM THE PT ONE OF THE FOLLOWING DOCUMENTS FOR STANDARD PROOF OF INCOME FOR 2020 OR MORE CURRENT    [id number]  W2  SS INCOME STATEMENT    PLEASE [phone]    PT'S PAY STUB WAS SUBMITTED BUT IT'S NON-STANDARD. STANDARD DOCUMENTS ARE PROCESSED WITHOUT REVIEW SO THIS REQUEST WILL SPEED UP THE PROCESS FOR EVALUATION.    Do you require a callback: YES, IF ANY QUESTIONS.

## 2021-10-27 NOTE — TELEPHONE ENCOUNTER
I don't see any documentation of the patient being reached out, I sent [employer] message to patient to notify her of this. Let me know if I can assist in any way.

## 2021-11-02 ENCOUNTER — TELEPHONE (OUTPATIENT)
Dept: NEUROLOGY | Facility: CLINIC | Age: 41
End: 2021-11-02

## 2021-11-02 ENCOUNTER — TELEPHONE (OUTPATIENT)
Dept: NEUROLOGY | Facility: OTHER | Age: 41
End: 2021-11-02

## 2021-11-02 NOTE — TELEPHONE ENCOUNTER
Provider:   Caller: PATIENT  Relationship to Patient: SELF  Pharmacy: NA  Phone Number: [phone]  Reason for Call: PATIENT WANTING TO KNOW IF SHE IS SUPPOSED TO KEEP REGULAR F/U APPOINTMENTS OR IS IT OKAY TO JUST COME IN FOR BOTOX APPOINTMENTS? PLEASE ADVISE.   When was the patient last seen: 9-24-21

## 2021-11-02 NOTE — TELEPHONE ENCOUNTER
PATIENT RETURNING CALL TO LOI IN OFFICE. I WAS ABLE TO WARM TRANSFER. THIS WAS IN REGARD TO APPOINTMENTS IN BETWEEN HER BOTOX APPTS.

## 2021-11-24 ENCOUNTER — TELEPHONE (OUTPATIENT)
Dept: NEUROLOGY | Facility: CLINIC | Age: 41
End: 2021-11-24

## 2021-11-24 NOTE — TELEPHONE ENCOUNTER
Caller: BRIDGETT  WITH [employer]    Best call back number:  [phone]    Who are you requesting to speak with (clinical staff, provider,  specific staff member): CANDACE    What was the call regarding: BOTOX PATIENT ASSISTANCE AND PT IS DUE FOR NEXT INJECTION 12-1-21    Do you require a callback: YES, PLEASE.

## 2021-11-29 ENCOUNTER — TELEPHONE (OUTPATIENT)
Dept: NEUROLOGY | Facility: CLINIC | Age: 41
End: 2021-11-29

## 2021-11-29 NOTE — TELEPHONE ENCOUNTER
[phone] - Ana M had called you regarding the patient and is wanting to know if you know if the patient has been approved for the patient assistance program. Please contact nAa M at [phone]. If the patient has not been approved for the program does she want to keep her appointment on 12/01/2021? If she keeps the appointment whatever insurance does not cover she will responsible for which is where the assistance program comes into play. You may need to reach out to the patient as Ana M has been unable to reach her regarding the matter.     Thank you.

## 2021-12-01 ENCOUNTER — PROCEDURE VISIT (OUTPATIENT)
Dept: NEUROLOGY | Facility: CLINIC | Age: 41
End: 2021-12-01

## 2021-12-01 DIAGNOSIS — G43.709 CHRONIC MIGRAINE WITHOUT AURA WITHOUT STATUS MIGRAINOSUS, NOT INTRACTABLE: Primary | ICD-10-CM

## 2021-12-01 PROCEDURE — 64615 CHEMODENERV MUSC MIGRAINE: CPT | Performed by: PSYCHIATRY & NEUROLOGY

## 2021-12-01 NOTE — PROGRESS NOTES
Botox injection: Botox injection for neuromuscular block.  Indication: Chronic migraines.  Risks, benefits and alternatives were discussed with the patient.  EMG guidance was not used in identifying the injection site.  Procerus: 5 units was injected.  : 5 units was injected on the right and 5 units injected on the left.  Frontalis: 10 units injected on the right and 10 units injected on the left.  Temporalis: 20 units injected on the right and 20 units injected on the left.  Occipitalis: 15 units injected on the right and 15 units injected on the left.  Cervical paraspinal: 10 units injected on the right and 10 units injected on the left.  Trapezius: 15 units injected on the right and 15 units injected on the left.  200 unit vial, 1 vials, 45 wasted and 155 used.  The patient tolerated the procedure well.  There were no complications.  Patient instructions: The patient was instructed that they may experience localized discomfort over the next 12 to 24 hours and may take over the counter pain medication if needed.  Follow-up in the office in 3 months for next Botox injection.    Buy and bill      The patient is here today for Botox injections for migraines. She  reports significant improvement in her headaches with Botox injections. Prior to Botox she was having 15+ migraines per month. Since Botox injections patient reports to have 7-9 migraine headaches in the last 30 days.   I tried her on Botox injections for migraines which she reports has been very helpful and she is getting maybe 2 migraines per week since her first set of injections which is a greater than 50% reduction. She is not on a CGRP medication.     Previous failed migraine treatments    She has tried topiramate 200mg daily which initially helped but had not helped more recently and due to having history of kidney stones this was discontinued.  She was previously on Prozac which has been discontinued and she continues to take bupropion  currently.She has tried Imitrex and Fioricet which she does not think was helpful.  Maxalt seems to be helping better     Patient's last set of Botox injections was on 8/25/21.  She denies any adverse effects from previous injections.    Risks, benefits, and potential side effects of Botox were discussed with her and she consented to the procedure.

## 2021-12-28 ENCOUNTER — TELEMEDICINE (OUTPATIENT)
Dept: NEUROLOGY | Facility: CLINIC | Age: 41
End: 2021-12-28

## 2021-12-28 DIAGNOSIS — G43.709 CHRONIC MIGRAINE WITHOUT AURA WITHOUT STATUS MIGRAINOSUS, NOT INTRACTABLE: Primary | ICD-10-CM

## 2021-12-28 PROCEDURE — 99213 OFFICE O/P EST LOW 20 MIN: CPT | Performed by: PSYCHIATRY & NEUROLOGY

## 2021-12-28 RX ORDER — RIZATRIPTAN BENZOATE 10 MG/1
10 TABLET ORAL ONCE AS NEEDED
Qty: 12 TABLET | Refills: 2 | Status: SHIPPED | OUTPATIENT
Start: 2021-12-28 | End: 2022-08-15 | Stop reason: SDUPTHER

## 2021-12-28 NOTE — ASSESSMENT & PLAN NOTE
41 year old woman with chronic medically refractory migraine headaches with occipital neuralgia component on examination which has progressed with continued 15+ migraine days per month but has improved down to maybe 2 migraines per week which are less evere.  Normal neurological examination otherwise today.  Brain MRI with nonspecific white matter changes which can be related to migraines.  She has discontinued the topiramate which was not helping and multiple antidepressants have been used which also did not help and continued to have daily headaches and 15+ migraine days per week until her Botox injections.  She is also concerned about possible kidney stones with the topiramate so we discontinued this medicine.  I would not try TCAs due to being on antidepressants.  I will also not try beta-blockers as her BP is low on visit today.  I advised her to drink lots of fluids.  I started her on CGRP medication Emgality for migraine prevention which she has not noticed much assistance and has had 4 injections thus far and I tried to set her up for ONB as she has tenderness to palpation in the occipital region over the greater occipital nerves bilaterally but insurance denied this medicine.  Will continue the Maxalt which seems to be helping better than the sumatriptan for acute treatment.  She will continue to receive Botox injections for chronic medically refractory migraine headaches at this time.  Discussed potential side effects.  I discussed migraine triggers and lifestyle modifications at length and provided patient education information today.  Will follow up in 2-3 months

## 2021-12-28 NOTE — PROGRESS NOTES
Chief Complaint  Migraine    Subjective          Dianna Clay presents to [facility] for   HISTORY OF PRESENT ILLNESS:    Dianna Clay is a 41 year old right handed woman who is being evaluated via telehealth with audio and video for follow up evaluation and treatment of chronic migraines.  She reports her migraines starting about 15+ years ago and were under control and about 7+ months ago they started getting worse.  Her migraines are located in the back of her head and also front of her head with throbbing quality which she rates as 8-10/10 on pain scale 1-10 with associated light and sound sensitivity and nausea and vomiting at times.  Headaches typically last a couple days and she feels like she has had a constant daily headache over the past 7+ months.  She has tried topiramate 200mg daily which initially helped but had not helped more recently and due to having history of kidney stones this was discontinued.  She was previously on Prozac which has been discontinued and she continues to take bupropion currently.  She has had increased stress in her life.  She also lost her dog.  She has tried Imitrex and Fioricet which she does not think was helpful.  Maxalt seems to be helping better.  She denies family history of migraines.  She had a brain MRI scan done on 4/14/2021 which looks good with some nonspecific white matter changes which can be seen with migraines.  I started her on Emgality following initial visit and she has had 4 doses so far and has not noticed any improvement in her migraines.  She continued to get migraines 15+ migraine days per month and Occipital nerve blocks were denied by her insurance company.  Instead I tried her on Botox injections for migraines which she reports has been very helpful and she is getting maybe 2 migraines per week since her first set of injections which is a greater than 50% reduction and following her second set of injections she  feels that is about what she is experiencing.  She thinks she may have COVID19 and decided to do telehealth today.      Past Medical History:   Diagnosis Date   • Anxiety    • Arthritis    • Headache         Family History   Problem Relation Age of Onset   • Thyroid disease Mother    • Depression Father    • Anxiety disorder Father    • Alcohol abuse Father    • Thyroid disease Maternal Aunt    • Asthma Maternal Aunt    • Thyroid disease Maternal Grandmother    • Anxiety disorder Paternal Grandmother    • Depression Paternal Grandmother    • Alcohol abuse Paternal Grandmother    • Dementia Paternal Grandmother    • Anxiety disorder Paternal Grandfather    • Depression Paternal Grandfather    • Alcohol abuse Paternal Grandfather    • No Known Problems Maternal Grandfather    • Alcohol abuse Paternal Uncle    • Anxiety disorder Daughter    • Depression Daughter         Social History     Socioeconomic History   • Marital status: Single   • Number of children: 2   Tobacco Use   • Smoking status: Former Smoker     Packs/day: 0.75     Years: 14.00     Pack years: 10.50     Quit date: 4/27/2011     Years since quitting: 10.6   • Smokeless tobacco: Never Used   Vaping Use   • Vaping Use: Never used   Substance and Sexual Activity   • Alcohol use: Yes     Comment: 1-2 times a week, 1-2 glasses at a time.    • Drug use: Not Currently     Comment: cocaine in late teens   • Sexual activity: Yes     Partners: Male     Birth control/protection: I.U.D.        I have personally reviewed the ROS as stated below.     Review of Systems     Neurological: Positive for headache, Fever and Chills. Negative for dizziness, tremors, seizures, syncope, facial asymmetry, speech difficulty, weakness, light-headedness, numbness, memory problem and confusion.   Hematological: Does not bruise/bleed easily.   Psychiatric/Behavioral: Negative for agitation, behavioral problems, decreased concentration, dysphoric mood, hallucinations, self-injury,  sleep disturbance, suicidal ideas, negative for hyperactivity, depressed mood and stress. The patient is nervous/anxious.    Objective   Vital Signs:   There were no vitals taken for this visit.  No vitals as this is a telehealth visit.      PHYSICAL EXAM:    General   Mental Status - Alert. General Appearance - Well developed, Well groomed, Oriented and Cooperative. Orientation - Oriented X3.       Head and Neck  Head - normocephalic, atraumatic with no lesions or palpable masses.  Neck    Global Assessment - supple.       Eye   Sclera/Conjunctiva - Bilateral - Normal.    ENMT  Mouth and Throat   Oral Cavity/Oropharynx: Oropharynx - the soft palate,uvula and tongue are normal in appearance.    Neurologic   Mental Status: Speech - Normal. Cognitive function - appropriate fund of knowledge. No impairment of attention, Impairment of concentration, impairment of long term memory or impairment of short term memory.  Cranial Nerves:   II Optic: Visual acuity - Left - Normal. Right - Normal. Visual fields - Normal (to confrontation).  III Oculomotor: Pupillary constriction - Left - Normal. Right - Normal.  VII Facial: - Normal Bilaterally.  VIII Acoustic - Bilateral - Hearing normal and (Hearing tested by finger rub).   IX Glossopharyngeal / X Vagus - Normal.  XI Accessory: Trapezius - Bilateral - Normal. Sternocleidomastoid - Bilateral - Normal.  XII Hypoglossal - Bilateral - Normal.  Eye Movements: - Normal Bilaterally.  Sensory:   Light Touch: Intact - Globally.  Motor:   Bulk and Contour: - Normal.  Tone: - Normal.  Tremor: Not present.  Strength: 5/5 normal muscle strength - All Muscles.  General Assessment: Coordination - No Impairment of finger-to-nose or Impairment of rapid alternating movements. Gait - Normal.       Result Review :                 Assessment and Plan    Problem List Items Addressed This Visit        Neuro    Chronic migraine without aura without status migrainosus, not intractable - Primary     Current Assessment & Plan     41 year old woman with chronic medically refractory migraine headaches with occipital neuralgia component on examination which has progressed with continued 15+ migraine days per month but has improved down to maybe 2 migraines per week which are less evere.  Normal neurological examination otherwise today.  Brain MRI with nonspecific white matter changes which can be related to migraines.  She has discontinued the topiramate which was not helping and multiple antidepressants have been used which also did not help and continued to have daily headaches and 15+ migraine days per week until her Botox injections.  She is also concerned about possible kidney stones with the topiramate so we discontinued this medicine.  I would not try TCAs due to being on antidepressants.  I will also not try beta-blockers as her BP is low on visit today.  I advised her to drink lots of fluids.  I started her on CGRP medication Emgality for migraine prevention which she has not noticed much assistance and has had 4 injections thus far and I tried to set her up for ONB as she has tenderness to palpation in the occipital region over the greater occipital nerves bilaterally but insurance denied this medicine.  Will continue the Maxalt which seems to be helping better than the sumatriptan for acute treatment.  She will continue to receive Botox injections for chronic medically refractory migraine headaches at this time.  Discussed potential side effects.  I discussed migraine triggers and lifestyle modifications at length and provided patient education information today.  Will follow up in 2-3 months            Relevant Medications    rizatriptan (Maxalt) 10 MG tablet        This was an audio and video enabled telemedicine encounter. Patient was at home with symptoms of COVID19 and physician was in clinic.     Patient gave consent for Telehealth visit.          Follow Up   No follow-ups on file.  Patient was  given instructions and counseling regarding her condition or for health maintenance advice. Please see specific information pulled into the AVS if appropriate.

## 2021-12-30 DIAGNOSIS — L70.9 ACNE, UNSPECIFIED ACNE TYPE: ICD-10-CM

## 2021-12-30 RX ORDER — MINOCYCLINE HYDROCHLORIDE 100 MG/1
CAPSULE ORAL
Qty: 60 CAPSULE | Refills: 4 | Status: SHIPPED | OUTPATIENT
Start: 2021-12-30 | End: 2023-01-04

## 2022-02-22 DIAGNOSIS — K21.9 GASTROESOPHAGEAL REFLUX DISEASE WITHOUT ESOPHAGITIS: ICD-10-CM

## 2022-02-24 RX ORDER — PANTOPRAZOLE SODIUM 20 MG/1
TABLET, DELAYED RELEASE ORAL
Qty: 90 TABLET | Refills: 0 | Status: SHIPPED | OUTPATIENT
Start: 2022-02-24 | End: 2022-07-05 | Stop reason: SDUPTHER

## 2022-03-02 ENCOUNTER — TELEPHONE (OUTPATIENT)
Dept: NEUROLOGY | Facility: CLINIC | Age: 42
End: 2022-03-02

## 2022-03-02 NOTE — TELEPHONE ENCOUNTER
Caller: FEDERICO    Relationship: [employer]    Best call back number: [phone]    Who are you requesting to speak with (clinical staff, provider,  specific staff member): DR FRANCIS    What was the call regarding: BOTOX INJECTIONS    CALLING TO CONFIRM IF PT IS WANTING TO USE THIS ASSISTANCE.    ASSISTANCE EXPIRES ON 12-5-22    FEDERICO IS WANTING TO KNOW PT'S LAST TIME A REQUEST WAS MADE FOR BOTOX.    Do you require a callback: YES, PLEASE.

## 2022-03-04 NOTE — PROGRESS NOTES
Botox injection: Botox injection for neuromuscular block.  Indication: Chronic migraines.  Risks, benefits and alternatives were discussed with the patient.  EMG guidance was not used in identifying the injection site.  Procerus: 5 units was injected.  : 5 units was injected on the right and 5 units injected on the left.  Frontalis: 10 units injected on the right and 10 units injected on the left.  Temporalis: 20 units injected on the right and 20 units injected on the left.  Occipitalis: 15 units injected on the right and 15 units injected on the left.  Cervical paraspinal: 10 units injected on the right and 10 units injected on the left.  Trapezius: 15 units injected on the right and 15 units injected on the left.  200 unit vial, 1 vials, 45 wasted and 155 used.  The patient tolerated the procedure well.  There were no complications.  Patient instructions: The patient was instructed that they may experience localized discomfort over the next 12 to 24 hours and may take over the counter pain medication if needed.  Follow-up in the office in 3 months for next Botox injection.     Buy and bill        The patient is here today for Botox injections for migraines. She  reports significant improvement in her headaches with Botox injections. Prior to Botox she was having 15+ migraines per month. Since Botox injections patient reports to have 8+ migraine headaches in the last 30 days.   I tried her on Botox injections for migraines which she reports has been very helpful and she is getting maybe 2 migraines per week since her first set of injections which is a greater than 50% reduction. She is not on a CGRP medication.     Previous failed migraine treatments    She has tried topiramate 200mg daily which initially helped but had not helped more recently and due to having history of kidney stones this was discontinued.  She was previously on Prozac which has been discontinued and she continues to take bupropion  currently.She has tried Imitrex and Fioricet which she does not think was helpful.  Maxalt seems to be helping better     Patient's last set of Botox injections was on 12/1/21.  She denies any adverse effects from previous injections.     Risks, benefits, and potential side effects of Botox were discussed with her and she consented to the procedure.

## 2022-03-09 ENCOUNTER — PROCEDURE VISIT (OUTPATIENT)
Dept: NEUROLOGY | Facility: CLINIC | Age: 42
End: 2022-03-09

## 2022-03-09 DIAGNOSIS — G43.709 CHRONIC MIGRAINE WITHOUT AURA WITHOUT STATUS MIGRAINOSUS, NOT INTRACTABLE: Primary | ICD-10-CM

## 2022-03-09 PROCEDURE — 64615 CHEMODENERV MUSC MIGRAINE: CPT | Performed by: PSYCHIATRY & NEUROLOGY

## 2022-04-26 RX ORDER — TRAZODONE HYDROCHLORIDE 100 MG/1
TABLET ORAL
Qty: 90 TABLET | Refills: 0 | Status: SHIPPED | OUTPATIENT
Start: 2022-04-26 | End: 2022-08-29

## 2022-05-03 ENCOUNTER — TELEPHONE (OUTPATIENT)
Dept: NEUROLOGY | Facility: CLINIC | Age: 42
End: 2022-05-03

## 2022-05-03 NOTE — TELEPHONE ENCOUNTER
Caller: FEDERICO    Relationship: CARE COORDINATOR / [employer]    Best call back number: [phone] [phone]    Who are you requesting to speak with (clinical staff, provider,  specific staff member):  DR FRANCIS    What was the call regarding:   BOTOX   CALLING TO CONFIRM THE BOTOX HAS BEEN DELIVERED AND IF PT HAS RECEIVED IT.     Do you require a callback: YES, PLEASE.

## 2022-05-04 NOTE — TELEPHONE ENCOUNTER
BRIDGETT WITH [employer] CALLED, WANTS TO KNOW HOW THE BOTOX ORDER IS PLACED.    PLEASE CALL AND ADVISE    THANK YOU    CALL BACK [phone]

## 2022-05-04 NOTE — TELEPHONE ENCOUNTER
Spoke with Rosi she stated that an order needs to be sent for further botox since she is approved, the patient may be liable for the buy and bill cost as she was approved for [employer]

## 2022-05-05 ENCOUNTER — TELEPHONE (OUTPATIENT)
Dept: NEUROLOGY | Facility: CLINIC | Age: 42
End: 2022-05-05

## 2022-05-05 NOTE — TELEPHONE ENCOUNTER
Spoke with vijay our botox referral specialist, she stated that she does verbal order- spoke with Ana M she stated that here on out we will need to order from [facility] [employer]  as patient has been approved for patient assistance

## 2022-05-05 NOTE — TELEPHONE ENCOUNTER
Contacted [employer] regarding patient and how Botox will be obtained. Per Ana M with [facility] patient Botox will need to be obtained via the Pull program, as she has qualified for the patient assist program. I called the patient assist phone number at [phone] and spoke with [employer] and she stated I needed to send in a product request form of which I do not currently have. [employer] is faxing over the form and this will need to be filled out for every visit for botox to be shipped. Of note, they do not deliver Botox on Monday's and therefore we will need to be mindful of delivery dates when requesting. [facility] 05/05/2022

## 2022-05-17 NOTE — TELEPHONE ENCOUNTER
FEDERICO FROM [facility] [phone] CALLING TO SEE IF RECEIVED P.A . SAID HALEY NEEDED IT ?     PLEASE ADVISE.

## 2022-06-03 ENCOUNTER — TELEPHONE (OUTPATIENT)
Dept: NEUROLOGY | Facility: CLINIC | Age: 42
End: 2022-06-03

## 2022-06-03 NOTE — TELEPHONE ENCOUNTER
Hub staff attempted to follow warm transfer process and was unsuccessful- PER CANDACE [facility]ALI[facility]AL [employer] [employer].    Caller: DARIUSZ    Relationship: [employer]/ [employer] ASSIST    Best call back number: [phone] OPT 1, OPT 4, OPT 3.     What was the call regarding: [facility] CALLED TO NOTIFY THE OFFICE THAT THE SHIPMENT REQUEST THEY RECEIVED YESTERDAY, 6/2/22, VIA FAX WAS RECEIVED AFTER THEIR CUT-OFF TIME FOR SAME-DAY DELIVERY. DARIUSZ STATES OFFICE WILL NEED TO REFAX REQUEST WITH NEW DELIVERY DATE. DARIUSZ NOTES THAT [employer]M IS MISSING THE PROVIDER'S STATE LICENSE # & SIGNATURE. THESE ARE REQUIRED FOR PROCESSING.     D[facility] NOTES THAT THEY CANNOT DELIVER ON MONDAYS SO RECOMMENDS LISTING DELIVERY DATE AS EITHER June 7TH OR 8TH.    Do you require a callback: IF NEEDED.    PLEASE REVIEW AND ADVISE.

## 2022-06-07 NOTE — TELEPHONE ENCOUNTER
BRIDGETT VILLA      CALL TRANSFERRED TO [facility] TO BETTER ASSIST AFTER TRYING TO FIGURE OUT SOME CLARIFICATION, SINCE SHE WAS ON HOLD FOR SO LONG [facility] TO THE CALL FOR ME.

## 2022-06-07 NOTE — TELEPHONE ENCOUNTER
Spoke with Ana M, advised that you had sent an order, but it had missed a cut off. I said we had a sample we could use for patient here in office- ana m said that is fine so we don't cancel the patient. Ana M asked for a call back from you to make sure everyone is on the same page since she had been on vacation, she would be very appreciative.  Call back [phone] (direct line)

## 2022-06-08 ENCOUNTER — PROCEDURE VISIT (OUTPATIENT)
Dept: NEUROLOGY | Facility: CLINIC | Age: 42
End: 2022-06-08

## 2022-06-08 DIAGNOSIS — G43.709 CHRONIC MIGRAINE WITHOUT AURA WITHOUT STATUS MIGRAINOSUS, NOT INTRACTABLE: Primary | ICD-10-CM

## 2022-06-08 PROCEDURE — 64615 CHEMODENERV MUSC MIGRAINE: CPT | Performed by: PSYCHIATRY & NEUROLOGY

## 2022-06-09 NOTE — TELEPHONE ENCOUNTER
Caller: BRIDGETT  Relationship to Patient: PAYOR DAISY  Reason for Call: SHE WANTED MAKE SURE SAMPLES WAS USED FOR BOTOX ON 6-8-22 - S/W LOI AND WAS ADVISED YES - BRIDGETT STATED UNDERSTANDING, BUT SHE DOES REQUEST CANDACE CALL HER BACK  [phone] DIRECT NUMBER  PLEASE CALL WHEN RETURNING BACK IN CLINIC THANK YOU.

## 2022-06-17 ENCOUNTER — TELEPHONE (OUTPATIENT)
Dept: GASTROENTEROLOGY | Facility: CLINIC | Age: 42
End: 2022-06-17

## 2022-07-05 ENCOUNTER — TELEPHONE (OUTPATIENT)
Dept: INTERNAL MEDICINE | Facility: CLINIC | Age: 42
End: 2022-07-05

## 2022-07-05 ENCOUNTER — OFFICE VISIT (OUTPATIENT)
Dept: INTERNAL MEDICINE | Facility: CLINIC | Age: 42
End: 2022-07-05

## 2022-07-05 ENCOUNTER — HOSPITAL ENCOUNTER (OUTPATIENT)
Dept: GENERAL RADIOLOGY | Facility: HOSPITAL | Age: 42
Discharge: HOME | End: 2022-07-05
Admitting: NURSE PRACTITIONER

## 2022-07-05 VITALS
SYSTOLIC BLOOD PRESSURE: 110 MMHG | TEMPERATURE: 97.7 F | HEIGHT: 66 IN | OXYGEN SATURATION: 97 % | WEIGHT: 208.6 LBS | BODY MASS INDEX: 33.52 KG/M2 | DIASTOLIC BLOOD PRESSURE: 72 MMHG | HEART RATE: 79 BPM

## 2022-07-05 DIAGNOSIS — R07.9 CHEST PAIN, UNSPECIFIED TYPE: Primary | ICD-10-CM

## 2022-07-05 DIAGNOSIS — K21.9 GASTROESOPHAGEAL REFLUX DISEASE WITHOUT ESOPHAGITIS: ICD-10-CM

## 2022-07-05 DIAGNOSIS — M85.672 OTHER CYST OF BONE, LEFT ANKLE AND FOOT: ICD-10-CM

## 2022-07-05 DIAGNOSIS — M89.9 LUMP OF RIB: ICD-10-CM

## 2022-07-05 DIAGNOSIS — Q18.1 CYST ON EAR: ICD-10-CM

## 2022-07-05 PROCEDURE — 73630 X-RAY EXAM OF FOOT: CPT

## 2022-07-05 PROCEDURE — 93000 ELECTROCARDIOGRAM COMPLETE: CPT | Performed by: NURSE PRACTITIONER

## 2022-07-05 PROCEDURE — 99214 OFFICE O/P EST MOD 30 MIN: CPT | Performed by: NURSE PRACTITIONER

## 2022-07-05 RX ORDER — PANTOPRAZOLE SODIUM 40 MG/1
40 TABLET, DELAYED RELEASE ORAL DAILY
Qty: 90 TABLET | Refills: 0 | Status: SHIPPED | OUTPATIENT
Start: 2022-07-05 | End: 2022-10-18

## 2022-07-05 NOTE — PROGRESS NOTES
Subjective   Dianna Clay is a 42 y.o. female. Muscle pain in chest area      History of Present Illness    The patient is here today with c/o chest pain, she feels its more muscular. It is tender with rolling over in bed and with stretching. Occurred for a few days in April, a few weeks in May and again in June. Restarted yesterday. On Sunday her neck started first.   She is exercising but feels that is not straining her neck and back. She was on vacation last week. She threw up the whole drive home on Saturday. Her boyfriend ended getting sick the next day.     She does have some back pain as well.     Also notes multiple bumps on her body. The one on her foot, she never had x-rayed. Feels it is a little bigger.    Left rib spot feels bigger as well.   New tiny spot behind left ear.     She is in counseling with her boyfriend, ended their engagement.     Anxiety and depression- doing well, off wellbutrin X several months.   Dad with stage IV liver ds, step mom with small cell lung ca.     GERD- has been sick on and off the last few months. Hx of poss ulcer     Lots of stress.   Her son is 16 and is not very nice.   The following portions of the patient's history were reviewed and updated as appropriate: allergies, current medications, past family history, past medical history, past social history, past surgical history and problem list.    Review of Systems   Constitutional: Negative for chills, fatigue and fever.   Respiratory: Positive for cough (barking cough last night, unsure if she has coughed today ). Negative for shortness of breath and wheezing.    Cardiovascular: Positive for chest pain. Negative for palpitations and leg swelling.   Musculoskeletal: Positive for back pain.   Psychiatric/Behavioral: Negative.        Objective   Physical Exam  Constitutional:       Appearance: Normal appearance. She is well-developed.   Neck:      Thyroid: No thyromegaly.   Cardiovascular:      Rate and Rhythm: Normal  rate and regular rhythm.      Heart sounds: Normal heart sounds.   Pulmonary:      Effort: Pulmonary effort is normal.      Breath sounds: Normal breath sounds.   Chest:   Breasts:      Right: Normal. No swelling, bleeding, inverted nipple, mass, nipple discharge, skin change, tenderness or supraclavicular adenopathy.      Left: Normal. No swelling, bleeding, inverted nipple, mass, nipple discharge, skin change, tenderness or supraclavicular adenopathy.            Comments: Thick fibrous breast tissue equal bilaterally   Left base of rib cage with small cyst approx 1.5 cm long, hard, mobile, tender to palpation   Musculoskeletal:      Cervical back: Normal range of motion and neck supple. Spasms present. No bony tenderness.      Thoracic back: Spasms present. No bony tenderness.        Back:    Lymphadenopathy:      Cervical: No cervical adenopathy.      Upper Body:      Right upper body: No supraclavicular adenopathy.      Left upper body: No supraclavicular adenopathy.   Skin:     General: Skin is warm and dry.             Comments: Left forearm with rubber, mobile, non tender lipoma approx 1 cm  Left posterior ear with tiny hard cyst   Left foot with hard non mobile 0.5 cm cyst top of foot    Neurological:      Mental Status: She is alert.   Psychiatric:         Behavior: Behavior normal.         Thought Content: Thought content normal.         Judgment: Judgment normal.         Vitals:    07/05/22 1540   BP: 110/72   Pulse: 79   Temp: 97.7 °F (36.5 °C)   SpO2: 97%     Body mass index is 33.93 kg/m².    Current Outpatient Medications:   •  cetirizine (zyrTEC) 10 MG tablet, Take 10 mg by mouth Daily., Disp: , Rfl:   •  fluticasone (FLONASE) 50 MCG/ACT nasal spray, 2 sprays into the nostril(s) as directed by provider Daily., Disp: 9.9 mL, Rfl: 11  •  levonorgestrel (MIRENA) 20 MCG/24HR IUD, 1 each by Intrauterine route., Disp: , Rfl:   •  minocycline (MINOCIN,DYNACIN) 100 MG capsule, Take 1 capsule by mouth once  daily, Disp: 60 capsule, Rfl: 4  •  pantoprazole (PROTONIX) 40 MG EC tablet, Take 1 tablet by mouth Daily., Disp: 90 tablet, Rfl: 0  •  traZODone (DESYREL) 100 MG tablet, TAKE 1 TABLET BY MOUTH AT BEDTIME, Disp: 90 tablet, Rfl: 0  •  ondansetron (ZOFRAN) 4 MG tablet, Take 1 tablet by mouth Every 6 (Six) Hours As Needed for Nausea or Vomiting., Disp: 15 tablet, Rfl: 0  •  rizatriptan (Maxalt) 10 MG tablet, Take 1 tablet by mouth 1 (One) Time As Needed for Migraine for up to 12 days. May repeat in 2 hours if needed, Disp: 12 tablet, Rfl: 2    Assessment & Plan   Diagnoses and all orders for this visit:    1. Chest pain, unspecified type (Primary)    2. Lump of rib  -     US abdomen limited; Future    3. Cyst on ear  -     US Head Neck Soft Tissue; Future    4. Other cyst of bone, left ankle and foot  -     XR Foot 3+ View Left; Future    5. Gastroesophageal reflux disease without esophagitis  -     pantoprazole (PROTONIX) 40 MG EC tablet; Take 1 tablet by mouth Daily.  Dispense: 90 tablet; Refill: 0  -     Ambulatory Referral to Gastroenterology    Other orders  -     ECG 12 Lead          ECG 12 Lead    Date/Time: 7/5/2022 4:22 PM  Performed by: Dianna Mckeon APRN  Authorized by: Dianna Mckeon APRN   Comparison: compared with previous ECG from 5/26/2021  Similar to previous ECG  Rhythm: sinus rhythm  Rate: normal  ST Segments: ST segments normal  QRS axis: normal    Clinical impression: normal ECG                   1. Chest pain- EKG ok today, suspect musculoskeletal, try NSAIDs BID with food, if symptoms continue suggest PT, she will call if order wanted.   2. Cyst- post ear, check US  3. Cyst left rib- check US  4. Cyst of foot- check x-ray   5. GERD- increase protonix to 40 mg, low acid diet and send referral to GI    Suggest well check up soon.

## 2022-07-05 NOTE — TELEPHONE ENCOUNTER
Patient was transferred from the hub to schedule for chest pain patient was advised to go to ER but patient stated that she has had this pain before and did not want to go to ER. Scheduled patient for same day appointment

## 2022-07-06 ENCOUNTER — TELEPHONE (OUTPATIENT)
Dept: INTERNAL MEDICINE | Facility: CLINIC | Age: 42
End: 2022-07-06

## 2022-07-06 DIAGNOSIS — M85.672 OTHER CYST OF BONE, LEFT ANKLE AND FOOT: Primary | ICD-10-CM

## 2022-07-06 NOTE — TELEPHONE ENCOUNTER
Caller: Dianna Clay    Relationship: Self    Best call back number: [phone]    What was the call regarding: PATIENT STATED THAT SHE GOT HER RESULTS BACK FROM THE XRAY OF HER FOOT. PATIENT STATED THAT IT SAID THAT MAYBE AN MRI WOULD BE NEEDED BUT OTHERWISE THERE WAS NOT ANYTHING FOUND ON THE XRAY. PATIENT WOULD LIKE TO KNOW WHAT HER NEXT STEPS ARE FROM HERE. PLEASE ADVISE.     Do you require a callback: YES

## 2022-07-08 ENCOUNTER — TELEPHONE (OUTPATIENT)
Dept: NEUROLOGY | Facility: CLINIC | Age: 42
End: 2022-07-08

## 2022-07-08 NOTE — TELEPHONE ENCOUNTER
Caller: Dianna Clay    Relationship: Self    Best call back number: [phone]    What was the call regarding: THIS ENCOUNTER HAS BEEN CREATED, PER [facility] PROTOCOL, TO MAKE THE OFFICE AWARE THAT PT CALLED TO RESCHEDULE HER BOTOX F/U APPT W/ DR. FRANCIS THIS MORNING, 7/8/22. PT HAS BEEN RESCHEDULED FOR NEXT AVAILABLE APPT ON 8/15/22.    Do you require a callback: IF NEEDED    DOCUMENTING PER [facility] PROTOCOL AS PT HAS BEEN RESCHEDULED OUTSIDE OF THE SUGGESTED F/U TIMEFRAME.

## 2022-07-28 ENCOUNTER — APPOINTMENT (OUTPATIENT)
Dept: ULTRASOUND IMAGING | Facility: HOSPITAL | Age: 42
End: 2022-07-28

## 2022-08-15 ENCOUNTER — TELEMEDICINE (OUTPATIENT)
Dept: NEUROLOGY | Facility: CLINIC | Age: 42
End: 2022-08-15

## 2022-08-15 DIAGNOSIS — G43.709 CHRONIC MIGRAINE WITHOUT AURA WITHOUT STATUS MIGRAINOSUS, NOT INTRACTABLE: Primary | ICD-10-CM

## 2022-08-15 PROCEDURE — 99213 OFFICE O/P EST LOW 20 MIN: CPT | Performed by: PSYCHIATRY & NEUROLOGY

## 2022-08-15 RX ORDER — RIZATRIPTAN BENZOATE 10 MG/1
10 TABLET ORAL ONCE AS NEEDED
Qty: 12 TABLET | Refills: 2 | Status: SHIPPED | OUTPATIENT
Start: 2022-08-15 | End: 2022-10-21 | Stop reason: SDUPTHER

## 2022-08-15 NOTE — PROGRESS NOTES
Chief Complaint  migraine    Subjective          Dianna Clay presents to [facility] for   HISTORY OF PRESENT ILLNESS:    Patient is being evaluated via Telehealth utilizing both Video and Audio.      Dianna Clay is a 42 year old right handed woman who is being evaluated via telehealth with audio and video for follow up evaluation and treatment of chronic migraines.  She reports her migraines starting about 15+ years ago.  Her migraines are located in the back of her head and also front of her head with throbbing quality which she rates as 8-10/10 on pain scale 1-10 with associated light and sound sensitivity and nausea and vomiting at times.  Headaches typically last a couple days and she feels like she has had a constant daily headache over the past 7+ months.  She has tried topiramate 200mg daily which initially helped but had not helped more recently and due to having history of kidney stones this was discontinued.  She was previously on Prozac which has been discontinued and she continues to take bupropion currently.  She has had increased stress in her life.  She also lost her dog.  She has tried Imitrex and Fioricet which she does not think was helpful.  Maxalt seems to be helping better.  She denies family history of migraines.  She had a brain MRI scan done on 4/14/2021 which looks good with some nonspecific white matter changes which can be seen with migraines.  I started her on Emgality following initial visit and she has had 4 doses so far and has not noticed any improvement in her migraines.  She continued to get migraines 15+ migraine days per month and Occipital nerve blocks were denied by her insurance company.  Instead I tried her on Botox injections for migraines which she reports has been very helpful and she is getting maybe 1-2 migraines per month with her most recent Botox injection which is more than 80% drop in frequency of her migraines.  She uses  rizatriptan and Advil Migraine as needed rarely.  She is very happy with her current migraine control.        Past Medical History:   Diagnosis Date   • Anxiety    • Arthritis    • Headache         Family History   Problem Relation Age of Onset   • Thyroid disease Mother    • Depression Father    • Anxiety disorder Father    • Alcohol abuse Father    • Thyroid disease Maternal Aunt    • Asthma Maternal Aunt    • Thyroid disease Maternal Grandmother    • Anxiety disorder Paternal Grandmother    • Depression Paternal Grandmother    • Alcohol abuse Paternal Grandmother    • Dementia Paternal Grandmother    • Anxiety disorder Paternal Grandfather    • Depression Paternal Grandfather    • Alcohol abuse Paternal Grandfather    • No Known Problems Maternal Grandfather    • Alcohol abuse Paternal Uncle    • Anxiety disorder Daughter    • Depression Daughter         Social History     Socioeconomic History   • Marital status: Single   • Number of children: 2   Tobacco Use   • Smoking status: Former Smoker     Packs/day: 0.75     Years: 14.00     Pack years: 10.50     Quit date: 2011     Years since quittin.3   • Smokeless tobacco: Never Used   Vaping Use   • Vaping Use: Never used   Substance and Sexual Activity   • Alcohol use: Yes     Comment: 1-2 times a week, 1-2 glasses at a time.    • Drug use: Not Currently     Comment: cocaine in late teens   • Sexual activity: Yes     Partners: Male     Birth control/protection: I.U.D.        I have personally reviewed the ROS as stated below.     Review of Systems     Neurological: Negative for dizziness, tremors, seizures, syncope, facial asymmetry, speech difficulty, weakness, light-headedness, numbness, memory problem and confusion.   Hematological: Does not bruise/bleed easily.   Psychiatric/Behavioral: Negative for agitation, behavioral problems, decreased concentration, dysphoric mood, hallucinations, self-injury, sleep disturbance, suicidal ideas, negative for  hyperactivity, depressed mood and stress. The patient is nervous/anxious.    Objective   Vital Signs Not obtained as this is a Telehealth Video Visit    PHYSICAL EXAM:    General   Mental Status - Alert. General Appearance - Well developed, Well groomed, Oriented and Cooperative. Orientation - Oriented X3.       Head and Neck  Head - normocephalic, atraumatic with no lesions or palpable masses.  Neck    Global Assessment - supple.       Eye   Sclera/Conjunctiva - Bilateral - Normal.    ENMT  Mouth and Throat   Oral Cavity/Oropharynx: Oropharynx - the soft palate,uvula and tongue are normal in appearance.    Neurologic   Mental Status: Speech - Normal. Cognitive function - appropriate fund of knowledge. No impairment of attention, Impairment of concentration, impairment of long term memory or impairment of short term memory.  Cranial Nerves:   II Optic: Visual acuity - Left - Normal. Right - Normal.   VII Facial: - Normal Bilaterally.  VIII Acoustic - Bilateral - Hearing normal and (Hearing tested by finger rub).   IX Glossopharyngeal / X Vagus - Normal.  XI Accessory: Trapezius - Bilateral - Normal. Sternocleidomastoid - Bilateral - Normal.  XII Hypoglossal - Bilateral - Normal.  Eye Movements: - Normal Bilaterally.  Sensory:   Light Touch: Intact - Globally.  Motor:   Bulk and Contour: - Normal.  Tone: - Normal.  Tremor: Not present.  Strength: 5/5 normal muscle strength - All Muscles.  General Assessment: - Coordination - No Impairment of finger-to-nose or Impairment of rapid alternating movements. Gait - Normal.       Result Review :                 Assessment and Plan    Problem List Items Addressed This Visit        Neuro    Chronic migraine without aura without status migrainosus, not intractable - Primary    Current Assessment & Plan     42 year old right handed woman with chronic migraines.  She reports her migraines starting about 15+ years ago.  Her migraines are located in the back of her head and also  front of her head with throbbing quality which she rates as 8-10/10 on pain scale 1-10 with associated light and sound sensitivity and nausea and vomiting at times.  Headaches typically last a couple days and she feels like she has had a constant daily headache over the past 7+ months.  She has tried topiramate 200mg daily which initially helped but had not helped more recently and due to having history of kidney stones this was discontinued.  She was previously on Prozac which has been discontinued and she continues to take bupropion currently.  She has had increased stress in her life.  She also lost her dog.  She has tried Imitrex and Fioricet which she does not think was helpful.  Maxalt seems to be helping better.  She denies family history of migraines.  She had a brain MRI scan done on 4/14/2021 which looks good with some nonspecific white matter changes which can be seen with migraines.  I started her on Emgality following initial visit and she has had 4 doses so far and has not noticed any improvement in her migraines.  She continued to get migraines 15+ migraine days per month and Occipital nerve blocks were denied by her insurance company.  Instead I tried her on Botox injections for migraines which she reports has been very helpful and she is getting maybe 1-2 migraines per month with her most recent Botox injection which is more than 80% drop in frequency of her migraines.  She uses rizatriptan and Advil Migraine as needed rarely.  She is very happy with her current migraine control.  I will continue the Botox injections and will refill the rizatriptan for her today as well.  Will see her for Botox and 3 months for follow up.            Relevant Medications    rizatriptan (Maxalt) 10 MG tablet        Patient provided consent for Telehealth visit.      This was an audio and video enabled telemedicine encounter.     I performed this clinical encounter via real-time telehealth video connection originating  from the patient's location at home and my location at [facility]. Verbal consent to participate in this telehealth video clinical visit was obtained and any questions by the patient regarding the interaction were answered.  This visit occurred during the coronavirus (Covid-19) Public Health Emergency.     Follow Up   Return in about 3 months (around 11/15/2022).  Patient was given instructions and counseling regarding her condition or for health maintenance advice. Please see specific information pulled into the AVS if appropriate.

## 2022-08-15 NOTE — ASSESSMENT & PLAN NOTE
42 year old right handed woman with chronic migraines.  She reports her migraines starting about 15+ years ago.  Her migraines are located in the back of her head and also front of her head with throbbing quality which she rates as 8-10/10 on pain scale 1-10 with associated light and sound sensitivity and nausea and vomiting at times.  Headaches typically last a couple days and she feels like she has had a constant daily headache over the past 7+ months.  She has tried topiramate 200mg daily which initially helped but had not helped more recently and due to having history of kidney stones this was discontinued.  She was previously on Prozac which has been discontinued and she continues to take bupropion currently.  She has had increased stress in her life.  She also lost her dog.  She has tried Imitrex and Fioricet which she does not think was helpful.  Maxalt seems to be helping better.  She denies family history of migraines.  She had a brain MRI scan done on 4/14/2021 which looks good with some nonspecific white matter changes which can be seen with migraines.  I started her on Emgality following initial visit and she has had 4 doses so far and has not noticed any improvement in her migraines.  She continued to get migraines 15+ migraine days per month and Occipital nerve blocks were denied by her insurance company.  Instead I tried her on Botox injections for migraines which she reports has been very helpful and she is getting maybe 1-2 migraines per month with her most recent Botox injection which is more than 80% drop in frequency of her migraines.  She uses rizatriptan and Advil Migraine as needed rarely.  She is very happy with her current migraine control.  I will continue the Botox injections and will refill the rizatriptan for her today as well.  Will see her for Botox and 3 months for follow up.

## 2022-08-17 ENCOUNTER — OFFICE VISIT (OUTPATIENT)
Dept: GASTROENTEROLOGY | Facility: CLINIC | Age: 42
End: 2022-08-17

## 2022-08-17 ENCOUNTER — PREP FOR SURGERY (OUTPATIENT)
Dept: SURGERY | Facility: SURGERY CENTER | Age: 42
End: 2022-08-17

## 2022-08-17 VITALS
BODY MASS INDEX: 32.37 KG/M2 | HEART RATE: 100 BPM | HEIGHT: 66 IN | SYSTOLIC BLOOD PRESSURE: 118 MMHG | TEMPERATURE: 97.3 F | WEIGHT: 201.4 LBS | DIASTOLIC BLOOD PRESSURE: 86 MMHG | OXYGEN SATURATION: 99 %

## 2022-08-17 DIAGNOSIS — R11.0 NAUSEA: ICD-10-CM

## 2022-08-17 DIAGNOSIS — K21.9 GASTROESOPHAGEAL REFLUX DISEASE, UNSPECIFIED WHETHER ESOPHAGITIS PRESENT: Primary | ICD-10-CM

## 2022-08-17 DIAGNOSIS — R93.89 ABNORMAL FINDING ON RADIOLOGY EXAM: ICD-10-CM

## 2022-08-17 DIAGNOSIS — R11.2 NAUSEA AND VOMITING, UNSPECIFIED VOMITING TYPE: ICD-10-CM

## 2022-08-17 DIAGNOSIS — R63.4 UNINTENTIONAL WEIGHT LOSS: ICD-10-CM

## 2022-08-17 DIAGNOSIS — R19.7 DIARRHEA: ICD-10-CM

## 2022-08-17 DIAGNOSIS — R14.0 ABDOMINAL BLOATING: ICD-10-CM

## 2022-08-17 DIAGNOSIS — K76.0 FATTY LIVER: ICD-10-CM

## 2022-08-17 PROCEDURE — 99204 OFFICE O/P NEW MOD 45 MIN: CPT | Performed by: INTERNAL MEDICINE

## 2022-08-17 RX ORDER — SODIUM CHLORIDE 0.9 % (FLUSH) 0.9 %
3 SYRINGE (ML) INJECTION EVERY 12 HOURS SCHEDULED
Status: CANCELLED | OUTPATIENT
Start: 2022-08-17

## 2022-08-17 RX ORDER — SODIUM CHLORIDE 0.9 % (FLUSH) 0.9 %
10 SYRINGE (ML) INJECTION AS NEEDED
Status: CANCELLED | OUTPATIENT
Start: 2022-08-17

## 2022-08-17 RX ORDER — ONDANSETRON 4 MG/1
4 TABLET, FILM COATED ORAL EVERY 6 HOURS PRN
Qty: 15 TABLET | Refills: 0 | Status: SHIPPED | OUTPATIENT
Start: 2022-08-17

## 2022-08-17 RX ORDER — METRONIDAZOLE 7.5 MG/G
GEL VAGINAL
COMMUNITY
Start: 2022-08-04 | End: 2022-08-17

## 2022-08-17 RX ORDER — SODIUM CHLORIDE, SODIUM LACTATE, POTASSIUM CHLORIDE, CALCIUM CHLORIDE 600; 310; 30; 20 MG/100ML; MG/100ML; MG/100ML; MG/100ML
30 INJECTION, SOLUTION INTRAVENOUS CONTINUOUS PRN
Status: CANCELLED | OUTPATIENT
Start: 2022-08-17

## 2022-08-17 NOTE — PROGRESS NOTES
"Chief Complaint   Patient presents with   • GI Problem     Abnormal liver imaging, nausea           History of Present Illness  Patient today for consultation regarding chronic and worsening GERD.  She had a prior CT showing evidence of likely fatty liver change.  Liver enzymes normal.    She describes persistent post-prandial nausea, early satiety and a long-standing history of GERD and IBS-D.  Denies known food triggers.   Denies dysphagia.  Reports that nausea can be present without food intake.     Reports unintentional weight loss of 15 pound lbs over past 8 weeks secondary to decreased hunger.       Describes bowel habits as irregular with fecal urgency without incontinence with intermittent passage of undigested food particles.     Positive family history of liver disease and (+) HFE gene mutation     Denies current use of NSAIDs.  Previous history of Laxative use.      Result Review :       Comprehensive Metabolic Panel (03/29/2021 10:11)  Progress Notes by Dianna Mckeon APRN (07/05/2022 15:30)  CT ABDOMEN PELVIS WITH CONTRAST (07/02/2015 08:55)      Vital Signs:   /86   Pulse 100   Temp 97.3 °F (36.3 °C)   Ht 167 cm (65.75\")   Wt 91.4 kg (201 lb 6.4 oz)   SpO2 99%   BMI 32.75 kg/m²     Body mass index is 32.75 kg/m².     Physical Exam  Vitals reviewed.   Constitutional:       Appearance: Normal appearance.   Abdominal:      General: Bowel sounds are normal. There is no distension.      Palpations: Abdomen is soft. Abdomen is not rigid. There is no mass or pulsatile mass.      Tenderness: There is no abdominal tenderness. There is no guarding or rebound.           Assessment and Plan    Diagnoses and all orders for this visit:    1. Gastroesophageal reflux disease, unspecified whether esophagitis present (Primary)    2. Fatty liver    3. Nausea    4. Nausea and vomiting, unspecified vomiting type    5. Abnormal finding on radiology exam         BRIEF SUMMARY  Patient today for consultation.  She " complains of chronic and worsening reflux as well as postprandial nausea and early satiety and bloating.  We will proceed with an EGD and gastric emptying study for further evaluation.  She also complains of chronic diarrhea with a history of irritable bowel syndrome and we will proceed with colonoscopy with biopsy to rule out microscopic or overt colitis.  In addition she has a history of fatty liver disease and prior CT showed an atypical appearance to the fat in the liver and we will follow-up with an MRI of the liver for further evaluation of abnormal findings on CT.  We also go ahead and start Zofran for nausea.    I have reviewed and confirmed the accuracy of the HPI and Assessment and Plan as documented by the LATHA Barragan        Follow Up   Return for at procedure.    Patient Instructions   Scheduling of your Ordered Diagnostic Tests- MRI of Liver and stomach emptying exam :    A team member from [facility] will contact you within the next 5 business days to schedule your tests.  If you have not heard them within this time frame, they can be contacted at [phone]    Schedule EGD and colonoscopy, orders placed     Continue taking Protonix 40 mg as ordered    Prescription sent for Zofran to your pharmacy to take as needed to help with nausea    Further recommendations to be made pending results of the above work-up and clinical course.    For Acid reflux :    Follow antireflux precautions:    1. Avoiding eating within 3 to 4 hours of bedtime.    2. Avoid foods that can trigger symptoms which may include:  a. citrus fruits  b. spicy foods,  c. Tomatoes  d. Red sauces  e.  Chocolate  f. coffee/tea  g. caffeinated or carbonated beverages  h. alcohol

## 2022-08-17 NOTE — PATIENT INSTRUCTIONS
Scheduling of your Ordered Diagnostic Tests- MRI of Liver and stomach emptying exam :    A team member from [facility] will contact you within the next 5 business days to schedule your tests.  If you have not heard them within this time frame, they can be contacted at [phone]    Schedule EGD and colonoscopy, orders placed     Continue taking Protonix 40 mg as ordered    Prescription sent for Zofran to your pharmacy to take as needed to help with nausea    Further recommendations to be made pending results of the above work-up and clinical course.    For Acid reflux :    Follow antireflux precautions:    Avoiding eating within 3 to 4 hours of bedtime.    Avoid foods that can trigger symptoms which may include:  citrus fruits  spicy foods,  Tomatoes  Red sauces   Chocolate  coffee/tea  caffeinated or carbonated beverages  alcohol

## 2022-08-18 PROBLEM — R63.4 UNINTENTIONAL WEIGHT LOSS: Status: ACTIVE | Noted: 2022-08-18

## 2022-08-18 PROBLEM — R14.0 ABDOMINAL BLOATING: Status: ACTIVE | Noted: 2022-08-18

## 2022-08-18 PROBLEM — R11.0 NAUSEA: Status: ACTIVE | Noted: 2022-08-18

## 2022-08-18 PROBLEM — R19.7 DIARRHEA: Status: ACTIVE | Noted: 2022-08-18

## 2022-08-29 RX ORDER — TRAZODONE HYDROCHLORIDE 100 MG/1
TABLET ORAL
Qty: 90 TABLET | Refills: 0 | Status: SHIPPED | OUTPATIENT
Start: 2022-08-29 | End: 2022-10-26

## 2022-09-14 ENCOUNTER — PROCEDURE VISIT (OUTPATIENT)
Dept: NEUROLOGY | Facility: CLINIC | Age: 42
End: 2022-09-14

## 2022-09-14 DIAGNOSIS — G43.709 CHRONIC MIGRAINE WITHOUT AURA WITHOUT STATUS MIGRAINOSUS, NOT INTRACTABLE: ICD-10-CM

## 2022-09-14 DIAGNOSIS — G43.009 MIGRAINE WITHOUT AURA AND WITHOUT STATUS MIGRAINOSUS, NOT INTRACTABLE: Primary | ICD-10-CM

## 2022-09-14 PROCEDURE — 64615 CHEMODENERV MUSC MIGRAINE: CPT | Performed by: PSYCHIATRY & NEUROLOGY

## 2022-09-14 NOTE — PROGRESS NOTES
Botox injection: Botox injection for neuromuscular block.  Indication: Chronic migraines.  Risks, benefits and alternatives were discussed with the patient.  EMG guidance was not used in identifying the injection site.  Procerus: 5 units was injected.  : 5 units was injected on the right and 5 units injected on the left.  Frontalis: 10 units injected on the right and 10 units injected on the left.  Temporalis: 20 units injected on the right and 20 units injected on the left.  Occipitalis: 15 units injected on the right and 15 units injected on the left.  Cervical paraspinal: 10 units injected on the right and 10 units injected on the left.  Trapezius: 15 units injected on the right and 15 units injected on the left.  200 unit vial, 1 vials, 45 wasted and 155 used.  The patient tolerated the procedure well.  There were no complications.  Patient instructions: The patient was instructed that they may experience localized discomfort over the next 12 to 24 hours and may take over the counter pain medication if needed.  Follow-up in the office in 3 months for next Botox injection.        BOTOX PT ASSISTANCE PROGRAM - SAMPLE

## 2022-09-30 ENCOUNTER — OUTSIDE FACILITY SERVICE (OUTPATIENT)
Dept: GASTROENTEROLOGY | Facility: CLINIC | Age: 42
End: 2022-09-30

## 2022-09-30 PROCEDURE — 45380 COLONOSCOPY AND BIOPSY: CPT | Performed by: INTERNAL MEDICINE

## 2022-09-30 PROCEDURE — 43239 EGD BIOPSY SINGLE/MULTIPLE: CPT | Performed by: INTERNAL MEDICINE

## 2022-10-18 DIAGNOSIS — K21.9 GASTROESOPHAGEAL REFLUX DISEASE WITHOUT ESOPHAGITIS: ICD-10-CM

## 2022-10-18 RX ORDER — PANTOPRAZOLE SODIUM 40 MG/1
TABLET, DELAYED RELEASE ORAL
Qty: 90 TABLET | Refills: 0 | Status: SHIPPED | OUTPATIENT
Start: 2022-10-18 | End: 2023-02-08

## 2022-10-21 ENCOUNTER — TELEMEDICINE (OUTPATIENT)
Dept: NEUROLOGY | Facility: CLINIC | Age: 42
End: 2022-10-21

## 2022-10-21 DIAGNOSIS — G43.709 CHRONIC MIGRAINE WITHOUT AURA WITHOUT STATUS MIGRAINOSUS, NOT INTRACTABLE: Primary | ICD-10-CM

## 2022-10-21 PROCEDURE — 99213 OFFICE O/P EST LOW 20 MIN: CPT | Performed by: PSYCHIATRY & NEUROLOGY

## 2022-10-21 RX ORDER — RIZATRIPTAN BENZOATE 10 MG/1
10 TABLET ORAL ONCE AS NEEDED
Qty: 12 TABLET | Refills: 2 | Status: SHIPPED | OUTPATIENT
Start: 2022-10-21 | End: 2023-01-19 | Stop reason: SDUPTHER

## 2022-10-21 NOTE — PROGRESS NOTES
Chief Complaint  migraine    Subjective          Dianna Clay presents to [facility] NEUROLOGY for   HISTORY OF PRESENT ILLNESS:    Patient is being evaluated via Telehealth utilizing Audio ONLY because Video was not working.      Dianna Clay is a 42 year old right handed woman who is being evaluated via telehealth with audio ONLY as video was not working for follow up evaluation and treatment of chronic migraines.  She reports her migraines starting about 15+ years ago.  Her migraines are located in the back of her head and also front of her head with throbbing quality which she rates as 8-10/10 on pain scale 1-10 with associated light and sound sensitivity and nausea and vomiting at times.  Headaches typically last a couple days and she feels like she has had a constant daily headache over the past 7+ months.  She has tried topiramate 200mg daily which initially helped but had not helped more recently and due to having history of kidney stones this was discontinued.  She was previously on Prozac which has been discontinued and she continues to take bupropion currently.  She has had increased stress in her life.  She also lost her dog.  She has tried Imitrex and Fioricet which she does not think was helpful.  Maxalt seems to be helping better.  She denies family history of migraines.  She had a brain MRI scan done on 4/14/2021 which looks good with some nonspecific white matter changes which can be seen with migraines.  I started her on Emgality following initial visit and she has had 4 doses so far and has not noticed any improvement in her migraines.  She continued to get migraines 15+ migraine days per month and Occipital nerve blocks were denied by her insurance company.  Instead I tried her on Botox injections for migraines which she reports has been very helpful and she is getting maybe 1-2 migraines per month with her most recent Botox injection which is more than 80% drop in  frequency of her migraines.  She uses rizatriptan and Advil Migraine as needed for the occasional migraines.  She is very happy with her current migraine control.        Past Medical History:   Diagnosis Date   • Anxiety    • Arthritis    • Headache         Family History   Problem Relation Age of Onset   • Thyroid disease Mother    • Colon polyps Father    • Depression Father    • Anxiety disorder Father    • Alcohol abuse Father    • Thyroid disease Maternal Aunt    • Asthma Maternal Aunt    • Alcohol abuse Paternal Uncle    • Thyroid disease Maternal Grandmother    • No Known Problems Maternal Grandfather    • Anxiety disorder Paternal Grandmother    • Depression Paternal Grandmother    • Alcohol abuse Paternal Grandmother    • Dementia Paternal Grandmother    • Anxiety disorder Paternal Grandfather    • Depression Paternal Grandfather    • Alcohol abuse Paternal Grandfather    • Anxiety disorder Daughter    • Depression Daughter    • Colon cancer Neg Hx    • Crohn's disease Neg Hx    • Ulcerative colitis Neg Hx    • Irritable bowel syndrome Neg Hx         Social History     Socioeconomic History   • Marital status: Single   • Number of children: 2   Tobacco Use   • Smoking status: Former     Packs/day: 0.75     Years: 14.00     Pack years: 10.50     Types: Cigarettes     Quit date: 2011     Years since quittin.4   • Smokeless tobacco: Never   Vaping Use   • Vaping Use: Never used   Substance and Sexual Activity   • Alcohol use: Yes     Comment: 1-2 times a week, 1-2 glasses at a time.    • Drug use: Not Currently     Comment: cocaine in late teens   • Sexual activity: Yes     Partners: Male     Birth control/protection: I.U.D.        I have personally reviewed the ROS as stated below.     Review of Systems     Neurological: Negative for dizziness, tremors, seizures, syncope, facial asymmetry, speech difficulty, weakness, light-headedness, numbness, memory problem and confusion.   Hematological: Does  not bruise/bleed easily.   Psychiatric/Behavioral: Negative for agitation, behavioral problems, decreased concentration, dysphoric mood, hallucinations, self-injury, sleep disturbance, suicidal ideas, negative for hyperactivity, depressed mood and stress. The patient is nervous/anxious but tells me this is improving with exercising in the Gym.    Objective   Vital Signs Not obtained as this is a Telehealth audio Visit    PHYSICAL EXAM:    Not applicable as her Video was not working and this is an Audio only visit.       Result Review :                 Assessment and Plan    Problem List Items Addressed This Visit        Neuro    Chronic migraine without aura without status migrainosus, not intractable - Primary    Current Assessment & Plan     42 year old right handed woman with chronic migraines.  Her migraines are located in the back of her head and also front of her head with throbbing quality which she rates as 8-10/10 on pain scale 1-10 with associated light and sound sensitivity and nausea and vomiting at times.  Headaches typically last a couple days and she feels like she was having a constant daily headache prior to Botox injections.  She has tried topiramate 200mg daily which initially helped but had not helped more recently and due to having history of kidney stones this was discontinued.  She was previously on Prozac which has been discontinued and she continues to take bupropion currently.  She has had increased stress in her life.  She also lost her dog.  She has tried Imitrex and Fioricet which she does not think was helpful.  Maxalt seems to be helping better.  She denies family history of migraines.  She had a brain MRI scan done on 4/14/2021 which looks good with some nonspecific white matter changes which can be seen with migraines.  I started her on Emgality following initial visit and she has had 4 doses so far and has not noticed any improvement in her migraines.  She continued to get migraines  15+ migraine days per month and Occipital nerve blocks were denied by her insurance company.  Instead I tried her on Botox injections for migraines which she reports has been very helpful and she is getting maybe 1-2 migraines per month with her most recent Botox injection which is more than 80% drop in frequency of her migraines.  She uses rizatriptan and Advil Migraine as needed for the occasional migraine.  She is very happy with her current migraine control.  I will continue the Botox injections and her next set of injections is scheduled for 12/14/2022 and will refill the rizatriptan for her today as well.  Will see her for Botox and 3 months for follow up.            Relevant Medications    rizatriptan (Maxalt) 10 MG tablet     Unable to complete visit using a video connection to the patient. A phone visit was used to complete this visits. Total time of discussion was 30 minutes.    Patient provided consent for Telehealth visit.      This was an audio Only enabled telemedicine encounter as video was not working.     I performed this clinical encounter via real-time Audio Only as Video was not working connection originating from the patient's location at home and my location at [facility]. Verbal consent to participate in this telehealth video clinical visit was obtained and any questions by the patient regarding the interaction were answered.  This visit occurred during the coronavirus (Covid-19) Public Health Emergency.     Follow Up   Return in about 3 months (around 1/21/2023).  Patient was given instructions and counseling regarding her condition or for health maintenance advice. Please see specific information pulled into the AVS if appropriate.

## 2022-10-21 NOTE — ASSESSMENT & PLAN NOTE
42 year old right handed woman with chronic migraines.  Her migraines are located in the back of her head and also front of her head with throbbing quality which she rates as 8-10/10 on pain scale 1-10 with associated light and sound sensitivity and nausea and vomiting at times.  Headaches typically last a couple days and she feels like she was having a constant daily headache prior to Botox injections.  She has tried topiramate 200mg daily which initially helped but had not helped more recently and due to having history of kidney stones this was discontinued.  She was previously on Prozac which has been discontinued and she continues to take bupropion currently.  She has had increased stress in her life.  She also lost her dog.  She has tried Imitrex and Fioricet which she does not think was helpful.  Maxalt seems to be helping better.  She denies family history of migraines.  She had a brain MRI scan done on 4/14/2021 which looks good with some nonspecific white matter changes which can be seen with migraines.  I started her on Emgality following initial visit and she has had 4 doses so far and has not noticed any improvement in her migraines.  She continued to get migraines 15+ migraine days per month and Occipital nerve blocks were denied by her insurance company.  Instead I tried her on Botox injections for migraines which she reports has been very helpful and she is getting maybe 1-2 migraines per month with her most recent Botox injection which is more than 80% drop in frequency of her migraines.  She uses rizatriptan and Advil Migraine as needed for the occasional migraine.  She is very happy with her current migraine control.  I will continue the Botox injections and her next set of injections is scheduled for 12/14/2022 and will refill the rizatriptan for her today as well.  Will see her for Botox and 3 months for follow up.

## 2022-10-26 RX ORDER — TRAZODONE HYDROCHLORIDE 100 MG/1
TABLET ORAL
Qty: 90 TABLET | Refills: 0 | Status: SHIPPED | OUTPATIENT
Start: 2022-10-26 | End: 2022-11-02

## 2022-11-02 RX ORDER — TRAZODONE HYDROCHLORIDE 100 MG/1
TABLET ORAL
Qty: 90 TABLET | Refills: 0 | Status: SHIPPED | OUTPATIENT
Start: 2022-11-02 | End: 2023-03-06

## 2022-11-23 ENCOUNTER — OFFICE VISIT (OUTPATIENT)
Dept: INTERNAL MEDICINE | Facility: CLINIC | Age: 42
End: 2022-11-23

## 2022-11-23 VITALS
HEART RATE: 60 BPM | TEMPERATURE: 98 F | SYSTOLIC BLOOD PRESSURE: 98 MMHG | OXYGEN SATURATION: 98 % | BODY MASS INDEX: 30.63 KG/M2 | DIASTOLIC BLOOD PRESSURE: 60 MMHG | WEIGHT: 190.6 LBS | HEIGHT: 66 IN

## 2022-11-23 DIAGNOSIS — J01.00 ACUTE NON-RECURRENT MAXILLARY SINUSITIS: Primary | ICD-10-CM

## 2022-11-23 LAB
EXPIRATION DATE: NORMAL
FLUAV AG UPPER RESP QL IA.RAPID: NOT DETECTED
FLUBV AG UPPER RESP QL IA.RAPID: NOT DETECTED
INTERNAL CONTROL: NORMAL
Lab: NORMAL
SARS-COV-2 AG UPPER RESP QL IA.RAPID: NOT DETECTED

## 2022-11-23 PROCEDURE — 87428 SARSCOV & INF VIR A&B AG IA: CPT | Performed by: NURSE PRACTITIONER

## 2022-11-23 PROCEDURE — 99213 OFFICE O/P EST LOW 20 MIN: CPT | Performed by: NURSE PRACTITIONER

## 2022-11-23 RX ORDER — METHYLPREDNISOLONE 4 MG/1
TABLET ORAL
Qty: 21 EACH | Refills: 0 | Status: SHIPPED | OUTPATIENT
Start: 2022-11-23 | End: 2023-01-04

## 2022-11-23 RX ORDER — ESTRADIOL 1 MG/1
1 TABLET ORAL DAILY
COMMUNITY
Start: 2022-09-21 | End: 2023-01-04

## 2022-12-14 ENCOUNTER — PROCEDURE VISIT (OUTPATIENT)
Dept: NEUROLOGY | Facility: CLINIC | Age: 42
End: 2022-12-14

## 2022-12-14 DIAGNOSIS — G43.719 INTRACTABLE CHRONIC MIGRAINE WITHOUT AURA AND WITHOUT STATUS MIGRAINOSUS: Primary | ICD-10-CM

## 2022-12-14 PROCEDURE — 64615 CHEMODENERV MUSC MIGRAINE: CPT | Performed by: PSYCHIATRY & NEUROLOGY

## 2022-12-14 RX ORDER — DEXTROAMPHETAMINE SACCHARATE, AMPHETAMINE ASPARTATE, DEXTROAMPHETAMINE SULFATE AND AMPHETAMINE SULFATE 5; 5; 5; 5 MG/1; MG/1; MG/1; MG/1
1 TABLET ORAL 2 TIMES DAILY
COMMUNITY
Start: 2022-12-01

## 2023-01-04 ENCOUNTER — OFFICE VISIT (OUTPATIENT)
Dept: ORTHOPEDIC SURGERY | Facility: CLINIC | Age: 43
End: 2023-01-04
Payer: COMMERCIAL

## 2023-01-04 ENCOUNTER — PATIENT ROUNDING (BHMG ONLY) (OUTPATIENT)
Dept: ORTHOPEDIC SURGERY | Facility: CLINIC | Age: 43
End: 2023-01-04
Payer: COMMERCIAL

## 2023-01-04 VITALS
DIASTOLIC BLOOD PRESSURE: 88 MMHG | BODY MASS INDEX: 30.79 KG/M2 | HEIGHT: 66 IN | WEIGHT: 191.6 LBS | HEART RATE: 80 BPM | SYSTOLIC BLOOD PRESSURE: 132 MMHG

## 2023-01-04 DIAGNOSIS — M54.12 CERVICAL RADICULOPATHY AT C6: Primary | ICD-10-CM

## 2023-01-04 DIAGNOSIS — M25.512 LEFT SHOULDER PAIN, UNSPECIFIED CHRONICITY: ICD-10-CM

## 2023-01-04 PROCEDURE — 73030 X-RAY EXAM OF SHOULDER: CPT | Performed by: INTERNAL MEDICINE

## 2023-01-04 PROCEDURE — 99203 OFFICE O/P NEW LOW 30 MIN: CPT | Performed by: INTERNAL MEDICINE

## 2023-01-04 PROCEDURE — 72040 X-RAY EXAM NECK SPINE 2-3 VW: CPT | Performed by: INTERNAL MEDICINE

## 2023-01-04 PROCEDURE — 1160F RVW MEDS BY RX/DR IN RCRD: CPT | Performed by: INTERNAL MEDICINE

## 2023-01-04 PROCEDURE — 1159F MED LIST DOCD IN RCRD: CPT | Performed by: INTERNAL MEDICINE

## 2023-01-04 RX ORDER — NORETHINDRONE ACETATE/ETHINYL ESTRADIOL AND FERROUS FUMARATE 1MG-20(21)
KIT ORAL
COMMUNITY
Start: 2022-12-20 | End: 2023-01-04

## 2023-01-04 RX ORDER — MINOCYCLINE HYDROCHLORIDE 100 MG/1
100 CAPSULE ORAL
COMMUNITY
Start: 2022-12-20 | End: 2023-01-04

## 2023-01-04 RX ORDER — METHYLPREDNISOLONE 4 MG/1
TABLET ORAL
Qty: 1 EACH | Refills: 0 | Status: SHIPPED | OUTPATIENT
Start: 2023-01-04 | End: 2023-01-12

## 2023-01-04 NOTE — PROGRESS NOTES
January 4, 2023    Hello, may I speak with Dianna Clay?    My name is BONNIE     I am  with [facility]  [address]  [place] [address] [phone].    Before we get started may I verify your date of birth? 1980    I am calling to officially welcome you to our practice and ask about your recent visit. Is this a good time to talk? YES    Tell me about your visit with us. What things went well?  EVERYTHING WENT GREAT.        We're always looking for ways to make our patients' experiences even better. Do you have recommendations on ways we may improve?  NO.     Overall were you satisfied with your first visit to our practice? ABSOLUTELY        I appreciate you taking the time to speak with me today. Is there anything else I can do for you? NO      Thank you, and have a great day.

## 2023-01-04 NOTE — PROGRESS NOTES
Subjective:     Patient ID: Dianna Clay is a 42 y.o. female.    Chief Complaint:    History of Present Illness  Dianna Clay presents to clinic today for evaluation of left-sided shoulder/neck pain.  The patient states that about 6 weeks ago she woke up 1 morning and had sudden onset of worsening shoulder pain which radiates down her arm and into her fingers.  She describes a pins-and-needles type sensation over the lateral aspect of her forearm and some numbness in all 5 fingers but most pronounced in fingers 1 4 and 5.  She denies any limitation in shoulder motion or shoulder injury.  She is a very active kickboxing and is eager to get back to that.     Social History     Occupational History   • Occupation:    Tobacco Use   • Smoking status: Former     Packs/day: 0.75     Years: 14.00     Pack years: 10.50     Types: Cigarettes     Quit date: 2011     Years since quittin.6   • Smokeless tobacco: Never   Vaping Use   • Vaping Use: Never used   Substance and Sexual Activity   • Alcohol use: Yes     Comment: 1-2 times a week, 1-2 glasses at a time.    • Drug use: Not Currently     Comment: cocaine in late teens   • Sexual activity: Yes     Partners: Male     Birth control/protection: I.U.D.      Past Medical History:   Diagnosis Date   • Anxiety    • Arthritis    • Headache      Past Surgical History:   Procedure Laterality Date   • ABDOMINOPLASTY     • BREAST AUGMENTATION     • PATELLA REALIGNMENT         Family History   Problem Relation Age of Onset   • Thyroid disease Mother    • Colon polyps Father    • Depression Father    • Anxiety disorder Father    • Alcohol abuse Father    • Thyroid disease Maternal Aunt    • Asthma Maternal Aunt    • Alcohol abuse Paternal Uncle    • Thyroid disease Maternal Grandmother    • No Known Problems Maternal Grandfather    • Anxiety disorder Paternal Grandmother    • Depression Paternal Grandmother    • Alcohol abuse Paternal  Grandmother    • Dementia Paternal Grandmother    • Anxiety disorder Paternal Grandfather    • Depression Paternal Grandfather    • Alcohol abuse Paternal Grandfather    • Anxiety disorder Daughter    • Depression Daughter    • Colon cancer Neg Hx    • Crohn's disease Neg Hx    • Ulcerative colitis Neg Hx    • Irritable bowel syndrome Neg Hx                  Objective:  There were no vitals filed for this visit.  There were no vitals filed for this visit.  There is no height or weight on file to calculate BMI.        Ortho Exam       Imaging: 3 views the left shoulder were ordered and reviewed by self in the office today  Indication: Left shoulder pain  Findings: X-rays demonstrate no acute osseous abnormalities.  Mild degenerative changes of the acromioclavicular joint.  Glenohumeral joint is reduced without signs of degeneration.  No acute fracture dislocation or subluxation  Comparative studies: None    Imaging: AP and lateral of the cervical spine were ordered and reviewed by myself in the office today  Indication: Left shoulder/neck pain  Findings: AP and lateral of the cervical spine show moderate to severe spondylosis at C5-6 and C6-7 with anterior osteophytes and joint space narrowing.  Comparative studies: None  Assessment:        1. Left shoulder pain, unspecified chronicity           Plan:          1. Discussed treatment options at length with patient at today's visit.  Discussed with the patient that she has full active and passive range of motion of her left shoulder but based on her symptoms mainly numbness tingling pins-and-needles radiating down her radial forearm and into her left hand I believe the majority of her symptoms are coming from cervical radiculopathy most prominent at C5-6 and C6-7.  I discussed with the patient that I would like to try her on an anti-inflammatory ibuprofen 600 mg twice a day I will call her in a Medrol Dosepak to see if we can calm down the nerve inflammation.  I will  refer her for a spine MRI and depending on the results we will likely refer her to neurosurgery for further evaluation and management.    2. Follow up: sophie Clay was in agreement with plan and had all questions answered.     Medications:  No orders of the defined types were placed in this encounter.      Followup:  No follow-ups on file.    Diagnoses and all orders for this visit:    1. Left shoulder pain, unspecified chronicity (Primary)  -     XR Shoulder 2+ View Left          Dictated utilizing Dragon dictation

## 2023-01-12 ENCOUNTER — TELEPHONE (OUTPATIENT)
Dept: ORTHOPEDIC SURGERY | Facility: CLINIC | Age: 43
End: 2023-01-12
Payer: COMMERCIAL

## 2023-01-12 DIAGNOSIS — M54.12 CERVICAL RADICULOPATHY AT C6: Primary | ICD-10-CM

## 2023-01-12 NOTE — TELEPHONE ENCOUNTER
Spoke with patient she finished the medrol dose pack on Tuesday and the pain and tingling has returned in the left hand and now she is experiencing this in her right hand.    Did advise as per Dr. Smith If she still continues to experience symptoms or develops weakness she needs to go to ER to be evaluated.

## 2023-01-12 NOTE — TELEPHONE ENCOUNTER
Caller: LIBERTY   Relationship to Patient: SELF   Phone Number: [phone]  Reason for Call: PATIENT CALLING STATING THAT HER NECK IS IN PAIN, STATES IT HAS GONE OVER TO THE OTHER SIDE AND SHE IS NOW HAVING HEADACHES UNSURE IF THIS IS NORMAL. SPOKE WITH  TO SEE IF PATIENT NEEDED A FOLLOW UP OR A REFERRAL TO NEURO.

## 2023-01-12 NOTE — TELEPHONE ENCOUNTER
Detailed message left for patient as per Dr. Smith- will try and reach out to patient again shortly.

## 2023-01-12 NOTE — TELEPHONE ENCOUNTER
Caller: Dianna Clay    Relationship: Self    Best call back number:     Who are you requesting to speak with (clinical staff, provider,  specific staff member): DR DORAN/ CLINICAL    What was the call regarding: THE PATIENT'S NECK PAIN AND HEADACHES THAT SHE STATES HAS BEEN GOING ON FOR 3 DAYS NOW. SHE IS ALSO HAVING TINGLING IN BOTH HANDS NOW.    Do you require a callback: YES

## 2023-01-19 ENCOUNTER — TELEMEDICINE (OUTPATIENT)
Dept: NEUROLOGY | Facility: CLINIC | Age: 43
End: 2023-01-19
Payer: COMMERCIAL

## 2023-01-19 DIAGNOSIS — G43.709 CHRONIC MIGRAINE WITHOUT AURA WITHOUT STATUS MIGRAINOSUS, NOT INTRACTABLE: Primary | ICD-10-CM

## 2023-01-19 DIAGNOSIS — R20.0 NUMBNESS AND TINGLING: ICD-10-CM

## 2023-01-19 DIAGNOSIS — R20.2 NUMBNESS AND TINGLING: ICD-10-CM

## 2023-01-19 PROCEDURE — 99213 OFFICE O/P EST LOW 20 MIN: CPT | Performed by: PSYCHIATRY & NEUROLOGY

## 2023-01-19 RX ORDER — RIZATRIPTAN BENZOATE 10 MG/1
10 TABLET ORAL ONCE AS NEEDED
Qty: 12 TABLET | Refills: 2 | Status: SHIPPED | OUTPATIENT
Start: 2023-01-19

## 2023-01-19 NOTE — ASSESSMENT & PLAN NOTE
She tells me she is having problems involving her left shoulder.  She woke up after Thanksgiving with pain in the left shoulder.  She saw her orthopedic doctor who informed her that it is not her shoulder but arthritis in her neck.  She has a MRI of cervical spine ordered for next week.  She has numbness and tingling and at times radiating pain down her left arm. I informed her to wait for the MRI scan and follow up with [facility] as scheduled.  She may need an EMG/NCS testing as well.

## 2023-01-19 NOTE — PROGRESS NOTES
Chief Complaint  migraines    Subjective          Dianna Clay presents to [facility] NEUROLOG[facility] for   HISTORY OF PRESENT ILLNESS:    Patient is being evaluated via Telehealth utilizing Audio ONLY because Video was not working.       Dianna Clay is a 43 year old right handed woman who is being evaluated via telehealth with audio ONLY as video was not working for follow up evaluation and treatment of chronic migraines.  She reports her migraines starting about 15+ years ago.  Her migraines are located in the back of her head and also front of her head with throbbing quality which she rates as 8-10/10 on pain scale 1-10 with associated light and sound sensitivity and nausea and vomiting at times.  Headaches typically last a couple days and she feels like she has had a constant daily headache over the past 7+ months.  She has tried topiramate 200mg daily which initially helped but had not helped more recently and due to having history of kidney stones this was discontinued.  She was previously on Prozac which has been discontinued and she continues to take bupropion currently.  She has had increased stress in her life.  She also lost her dog.  She has tried Imitrex and Fioricet which she does not think was helpful.  Maxalt seems to be helping better.  She denies family history of migraines.  She had a brain MRI scan done on 4/14/2021 which looks good with some nonspecific white matter changes which can be seen with migraines.  I started her on Emgality following initial visit and she has had 5 doses so far and has not noticed any improvement in her migraines.  She continued to get migraines 15+ migraine days per month and Occipital nerve blocks were denied by her insurance company.  Instead I tried her on Botox injections for migraines which she reports has been very helpful and she is getting maybe 1-2 migraines per month with her most recent Botox injection which is more than 80% drop in  frequency of her migraines.  She uses rizatriptan and Advil Migraine as needed for the occasional migraines.  She is very happy with her current migraine control.  She tells me she is having problems involving her left shoulder.  She woke up after Thanksgiving with pain in the left shoulder.  She saw her orthopedic doctor who informed her that it is not her shoulder but arthritis in her neck.  She has a MRI of cervical spine ordered for next week.  She has numbness and tingling and at times radiating pain down her left arm.     Past Medical History:   Diagnosis Date   • Anxiety    • Arthritis    • Headache         Family History   Problem Relation Age of Onset   • Thyroid disease Mother    • Colon polyps Father    • Depression Father    • Anxiety disorder Father    • Alcohol abuse Father    • Thyroid disease Maternal Aunt    • Asthma Maternal Aunt    • Alcohol abuse Paternal Uncle    • Thyroid disease Maternal Grandmother    • No Known Problems Maternal Grandfather    • Anxiety disorder Paternal Grandmother    • Depression Paternal Grandmother    • Alcohol abuse Paternal Grandmother    • Dementia Paternal Grandmother    • Anxiety disorder Paternal Grandfather    • Depression Paternal Grandfather    • Alcohol abuse Paternal Grandfather    • Anxiety disorder Daughter    • Depression Daughter    • Colon cancer Neg Hx    • Crohn's disease Neg Hx    • Ulcerative colitis Neg Hx    • Irritable bowel syndrome Neg Hx         Social History     Socioeconomic History   • Marital status: Single   • Number of children: 2   Tobacco Use   • Smoking status: Former     Packs/day: 0.75     Years: 14.00     Pack years: 10.50     Types: Cigarettes     Quit date: 2011     Years since quittin.7   • Smokeless tobacco: Never   Vaping Use   • Vaping Use: Never used   Substance and Sexual Activity   • Alcohol use: Yes     Comment: 1-2 times a week, 1-2 glasses at a time.    • Drug use: Not Currently     Comment: cocaine in late  teens   • Sexual activity: Yes     Partners: Male     Birth control/protection: I.U.D.        I have personally reviewed the ROS as stated below.     Review of Systems     Neurological: Negative for dizziness, tremors, seizures, syncope, facial asymmetry, speech difficulty, weakness, light-headedness, numbness, memory problem and confusion.   Hematological: Does not bruise/bleed easily.   Psychiatric/Behavioral: Negative for agitation, behavioral problems, decreased concentration, dysphoric mood, hallucinations, self-injury, sleep disturbance, suicidal ideas, negative for hyperactivity, depressed mood and stress. The patient is nervous/anxious but tells me this is improving with exercising in the Gym    Objective   Vital Signs Not obtained as this is a Telehealth Video Visit    PHYSICAL EXAM:    Not applicable as her Video was not working and this is an Audio only visit    Result Review :                 Assessment and Plan    Problem List Items Addressed This Visit        Neuro    Chronic migraine without aura without status migrainosus, not intractable - Primary    Current Assessment & Plan     She continued to get migraines 15+ migraine days per month and Occipital nerve blocks were denied by her insurance company.  Instead I tried her on Botox injections for migraines which she reports has been very helpful and she is getting maybe 1-2 migraines per month with her most recent Botox injection which is more than 80% drop in frequency of her migraines.  She uses rizatriptan and Advil Migraine as needed for the occasional migraine.  She is very happy with her current migraine control.  I will continue the Botox injections and her next set of injections is scheduled for 3/15/2023 and will refill the rizatriptan for her today as well.  Will see her for Botox and 3 months for follow up.            Relevant Medications    rizatriptan (Maxalt) 10 MG tablet       Symptoms and Signs    Numbness and tingling    Current  Assessment & Plan     She tells me she is having problems involving her left shoulder.  She woke up after Thanksgiving with pain in the left shoulder.  She saw her orthopedic doctor who informed her that it is not her shoulder but arthritis in her neck.  She has a MRI of cervical spine ordered for next week.  She has numbness and tingling and at times radiating pain down her left arm. I informed her to wait for the MRI scan and follow up with [facility] as scheduled.  She may need an EMG/NCS testing as well.                Unable to complete visit using a video connection to the patient. A phone visit was used to complete this visits. Total time of discussion was 30 minutes.     Patient provided consent for Telehealth visit.       This was an audio Only enabled telemedicine encounter as video was not working.      I performed this clinical encounter via real-time Audio Only as Video was not working connection originating from the patient's location at home [facility] Verbal consent to participate in this telehealth video clinical visit was obtained and any questions by the patient regarding the interaction were answered.  This visit occurred during the coronavirus (Covid-19) Public Health Emergency.       Follow Up   No follow-ups on file.  Patient was given instructions and counseling regarding her condition or for health maintenance advice. Please see specific information pulled into the AVS if appropriate.

## 2023-01-19 NOTE — ASSESSMENT & PLAN NOTE
She continued to get migraines 15+ migraine days per month and Occipital nerve blocks were denied by her insurance company.  Instead I tried her on Botox injections for migraines which she reports has been very helpful and she is getting maybe 1-2 migraines per month with her most recent Botox injection which is more than 80% drop in frequency of her migraines.  She uses rizatriptan and Advil Migraine as needed for the occasional migraine.  She is very happy with her current migraine control.  I will continue the Botox injections and her next set of injections is scheduled for 3/15/2023 and will refill the rizatriptan for her today as well.  Will see her for Botox and 3 months for follow up.

## 2023-02-07 DIAGNOSIS — K21.9 GASTROESOPHAGEAL REFLUX DISEASE WITHOUT ESOPHAGITIS: ICD-10-CM

## 2023-02-08 RX ORDER — BUPROPION HYDROCHLORIDE 150 MG/1
TABLET ORAL
Qty: 30 TABLET | Refills: 0 | Status: SHIPPED | OUTPATIENT
Start: 2023-02-08 | End: 2023-03-06

## 2023-02-08 RX ORDER — PANTOPRAZOLE SODIUM 40 MG/1
TABLET, DELAYED RELEASE ORAL
Qty: 90 TABLET | Refills: 0 | Status: SHIPPED | OUTPATIENT
Start: 2023-02-08

## 2023-03-06 RX ORDER — TRAZODONE HYDROCHLORIDE 100 MG/1
TABLET ORAL
Qty: 90 TABLET | Refills: 0 | Status: SHIPPED | OUTPATIENT
Start: 2023-03-06

## 2023-03-06 RX ORDER — BUPROPION HYDROCHLORIDE 150 MG/1
TABLET ORAL
Qty: 30 TABLET | Refills: 0 | Status: SHIPPED | OUTPATIENT
Start: 2023-03-06

## 2023-03-06 NOTE — TELEPHONE ENCOUNTER
Rx Refill Note  Requested Prescriptions     Pending Prescriptions Disp Refills   • buPROPion XL (WELLBUTRIN XL) 150 MG 24 hr tablet [Pharmacy Med Name: buPROPion HCl ER (XL) 150 MG Oral Tablet Extended Release 24 Hour] 30 tablet 0     Sig: Take 1 tablet by mouth once daily      Last office visit with prescribing clinician: 11/23/2022   Last telemedicine visit with prescribing clinician: Visit date not found   Next office visit with prescribing clinician: 3/6/2023                         Would you like a call back once the refill request has been completed: [] Yes [] No    If the office needs to give you a call back, can they leave a voicemail: [] Yes [] No    Hannah Coombs LPN  03/06/23, 10:11 EST

## 2023-03-13 ENCOUNTER — TELEPHONE (OUTPATIENT)
Dept: NEUROLOGY | Facility: CLINIC | Age: 43
End: 2023-03-13
Payer: COMMERCIAL

## 2023-03-13 NOTE — TELEPHONE ENCOUNTER
Pt insurnace has changed and botox is under her plan. No longer in the Assistance program per [employer]. Called pt lvm.     Trying to submit auth for pt through insurancce now.

## 2023-03-13 NOTE — TELEPHONE ENCOUNTER
Called and spoke to pt. She states her insurance covers it now and to complete PA for Botox. Submitted PA. Specialty pharmacy is required sent to [facility].

## 2023-03-13 NOTE — TELEPHONE ENCOUNTER
Caller: Dianna Clay    Relationship: Self    Best call back number: [phone]    Who are you requesting to speak with (clinical staff, provider,  specific staff member):   SMOOTH    What was the call regarding:   PT RETURNING SMOOTH'S CALL. PLEASE CALL PT BACK

## 2023-03-15 ENCOUNTER — PROCEDURE VISIT (OUTPATIENT)
Dept: NEUROLOGY | Facility: CLINIC | Age: 43
End: 2023-03-15
Payer: COMMERCIAL

## 2023-03-15 DIAGNOSIS — G43.709 CHRONIC MIGRAINE WITHOUT AURA WITHOUT STATUS MIGRAINOSUS, NOT INTRACTABLE: Primary | ICD-10-CM

## 2023-03-15 NOTE — PROGRESS NOTES
Botox injection: Botox injection for neuromuscular block.  Indication: Chronic migraines.  Risks, benefits and alternatives were discussed with the patient.  EMG guidance was not used in identifying the injection site.  Procerus: 5 units was injected.  : 5 units was injected on the right and 5 units injected on the left.  Frontalis: 10 units injected on the right and 10 units injected on the left.  Temporalis: 20 units injected on the right and 20 units injected on the left.  Occipitalis: 15 units injected on the right and 15 units injected on the left.  Cervical paraspinal: 10 units injected on the right and 10 units injected on the left.  Trapezius: 15 units injected on the right and 15 units injected on the left.  200 unit vial, 1 vials, 45 wasted and 155 used.  The patient tolerated the procedure well.  There were no complications.  Patient instructions: The patient was instructed that they may experience localized discomfort over the next 12 to 24 hours and may take over the counter pain medication if needed.  Follow-up in the office in 3 months for next Botox injection.     Risks, benefits, and potential side effects of Botox were discussed with her and she consented to the procedure.     Sample

## 2023-03-15 NOTE — TELEPHONE ENCOUNTER
Have no received auth for Botox yet. However CPT code is covered under insurance. Will use sample since we have no heard back from insurance. So patient is not delayed in care.

## 2023-04-17 NOTE — TELEPHONE ENCOUNTER
Rx Refill Note  Requested Prescriptions     Pending Prescriptions Disp Refills   • buPROPion XL (WELLBUTRIN XL) 150 MG 24 hr tablet [Pharmacy Med Name: buPROPion HCl ER (XL) 150 MG Oral Tablet Extended Release 24 Hour] 30 tablet 0     Sig: Take 1 tablet by mouth once daily      Last office visit with prescribing clinician: 11/23/2022   Last telemedicine visit with prescribing clinician: Visit date not found   Next office visit with prescribing clinician: Visit date not found                         Would you like a call back once the refill request has been completed: [] Yes [] No    If the office needs to give you a call back, can they leave a voicemail: [] Yes [] No    Hannah Coombs LPN  04/17/23, 16:47 EDT

## 2023-04-19 RX ORDER — BUPROPION HYDROCHLORIDE 150 MG/1
TABLET ORAL
Qty: 30 TABLET | Refills: 0 | Status: SHIPPED | OUTPATIENT
Start: 2023-04-19

## 2023-05-04 ENCOUNTER — OFFICE VISIT (OUTPATIENT)
Dept: INTERNAL MEDICINE | Facility: CLINIC | Age: 43
End: 2023-05-04
Payer: COMMERCIAL

## 2023-05-04 VITALS
WEIGHT: 186 LBS | OXYGEN SATURATION: 99 % | DIASTOLIC BLOOD PRESSURE: 68 MMHG | HEART RATE: 103 BPM | TEMPERATURE: 98 F | HEIGHT: 66 IN | BODY MASS INDEX: 29.89 KG/M2 | SYSTOLIC BLOOD PRESSURE: 122 MMHG

## 2023-05-04 DIAGNOSIS — F41.8 MIXED ANXIETY DEPRESSIVE DISORDER: Primary | ICD-10-CM

## 2023-05-04 PROCEDURE — 99213 OFFICE O/P EST LOW 20 MIN: CPT | Performed by: NURSE PRACTITIONER

## 2023-05-04 RX ORDER — BUPROPION HYDROCHLORIDE 300 MG/1
300 TABLET ORAL DAILY
Qty: 90 TABLET | Refills: 2 | Status: SHIPPED | OUTPATIENT
Start: 2023-05-04

## 2023-05-04 RX ORDER — NORETHINDRONE ACETATE/ETHINYL ESTRADIOL AND FERROUS FUMARATE 1MG-20(21)
KIT ORAL
COMMUNITY
Start: 2023-03-12

## 2023-05-04 NOTE — PROGRESS NOTES
Subjective   Dianna Clay is a 43 y.o. female.     History of Present Illness    The patient is here today with c/o depression. She is going to do some [facility] grief counseling. Planing on joining a [facility] grief group.     Her Dad  , a few weeks later her MGM . She is also going to have to file a lawsuit against her company for sexual harassment.     She did make some comments about hurting herself the week after her Dad's death, her boyfriend took her gun and put it somewhere, she does not know where.   The following portions of the patient's history were reviewed and updated as appropriate: allergies, current medications, past family history, past medical history, past social history, past surgical history and problem list.    Review of Systems   Constitutional: Negative for chills and fever.   Respiratory: Negative.    Cardiovascular: Negative.    Psychiatric/Behavioral: Positive for depressed mood. Negative for dysphoric mood and suicidal ideas. The patient is nervous/anxious.        Objective   Physical Exam  Constitutional:       Appearance: Normal appearance. She is well-developed.   Neck:      Thyroid: No thyromegaly.   Cardiovascular:      Rate and Rhythm: Normal rate and regular rhythm.      Heart sounds: Normal heart sounds.   Pulmonary:      Effort: Pulmonary effort is normal.      Breath sounds: Normal breath sounds.   Musculoskeletal:      Cervical back: Normal range of motion and neck supple.   Lymphadenopathy:      Cervical: No cervical adenopathy.   Skin:     General: Skin is warm and dry.   Neurological:      Mental Status: She is alert.   Psychiatric:         Mood and Affect: Mood is depressed.         Behavior: Behavior normal.         Thought Content: Thought content normal.         Judgment: Judgment normal.         Vitals:    23 1125   BP: 122/68   Pulse: 103   Temp: 98 °F (36.7 °C)   SpO2: 99%     Body mass index is 30.25 kg/m².    Current Outpatient Medications:   •   amphetamine-dextroamphetamine (ADDERALL) 20 MG tablet, Take 1 tablet by mouth 2 (Two) Times a Day., Disp: , Rfl:   •  buPROPion XL (WELLBUTRIN XL) 300 MG 24 hr tablet, Take 1 tablet by mouth Daily., Disp: 90 tablet, Rfl: 2  •  cetirizine (zyrTEC) 10 MG tablet, Take 1 tablet by mouth Daily., Disp: , Rfl:   •  fluticasone (FLONASE) 50 MCG/ACT nasal spray, 2 sprays into the nostril(s) as directed by provider Daily., Disp: 9.9 mL, Rfl: 11  •  ondansetron (Zofran) 4 MG tablet, Take 1 tablet by mouth Every 6 (Six) Hours As Needed for Nausea or Vomiting., Disp: 15 tablet, Rfl: 0  •  pantoprazole (PROTONIX) 40 MG EC tablet, Take 1 tablet by mouth once daily, Disp: 90 tablet, Rfl: 0  •  rizatriptan (Maxalt) 10 MG tablet, Take 1 tablet by mouth 1 (One) Time As Needed for Migraine. May repeat in 2 hours if needed, Disp: 12 tablet, Rfl: 2  •  traZODone (DESYREL) 100 MG tablet, TAKE ONE TABLET BY MOUTH AT BEDTIME, Disp: 90 tablet, Rfl: 0  •  Gabriele Fe 1/20 1-20 MG-MCG per tablet, TAKE 1 TABLET BY MOUTH ONCE DAILY. TAKE CONTINUOUSLY, Disp: , Rfl:     Assessment & Plan   Diagnoses and all orders for this visit:    1. Mixed anxiety depressive disorder (Primary)    Other orders  -     buPROPion XL (WELLBUTRIN XL) 300 MG 24 hr tablet; Take 1 tablet by mouth Daily.  Dispense: 90 tablet; Refill: 2                 1. Depression- see grief group, increase wellbutrin. Notify for any s/s of suicidal ideation.

## 2023-05-08 ENCOUNTER — LAB (OUTPATIENT)
Dept: INTERNAL MEDICINE | Facility: CLINIC | Age: 43
End: 2023-05-08
Payer: COMMERCIAL

## 2023-05-08 DIAGNOSIS — Z00.00 HEALTH CARE MAINTENANCE: Primary | ICD-10-CM

## 2023-05-09 DIAGNOSIS — K21.9 GASTROESOPHAGEAL REFLUX DISEASE WITHOUT ESOPHAGITIS: ICD-10-CM

## 2023-05-09 RX ORDER — PANTOPRAZOLE SODIUM 40 MG/1
TABLET, DELAYED RELEASE ORAL
Qty: 90 TABLET | Refills: 0 | Status: SHIPPED | OUTPATIENT
Start: 2023-05-09 | End: 2023-05-11 | Stop reason: SDUPTHER

## 2023-05-11 ENCOUNTER — OFFICE VISIT (OUTPATIENT)
Dept: INTERNAL MEDICINE | Facility: CLINIC | Age: 43
End: 2023-05-11
Payer: COMMERCIAL

## 2023-05-11 VITALS
SYSTOLIC BLOOD PRESSURE: 102 MMHG | BODY MASS INDEX: 30.62 KG/M2 | HEART RATE: 82 BPM | OXYGEN SATURATION: 97 % | DIASTOLIC BLOOD PRESSURE: 68 MMHG | HEIGHT: 66 IN | TEMPERATURE: 98 F | WEIGHT: 190.5 LBS

## 2023-05-11 DIAGNOSIS — Z00.00 HEALTH CARE MAINTENANCE: Primary | ICD-10-CM

## 2023-05-11 DIAGNOSIS — F19.10 DRUG ABUSE: ICD-10-CM

## 2023-05-11 DIAGNOSIS — F31.32 BIPOLAR AFFECTIVE DISORDER, CURRENTLY DEPRESSED, MODERATE: ICD-10-CM

## 2023-05-11 DIAGNOSIS — K21.9 GASTROESOPHAGEAL REFLUX DISEASE WITHOUT ESOPHAGITIS: ICD-10-CM

## 2023-05-11 PROCEDURE — 99396 PREV VISIT EST AGE 40-64: CPT | Performed by: NURSE PRACTITIONER

## 2023-05-11 RX ORDER — PANTOPRAZOLE SODIUM 40 MG/1
40 TABLET, DELAYED RELEASE ORAL DAILY
Qty: 90 TABLET | Refills: 2 | Status: SHIPPED | OUTPATIENT
Start: 2023-05-11

## 2023-05-11 RX ORDER — BUPROPION HYDROCHLORIDE 150 MG/1
150 TABLET ORAL DAILY
Start: 2023-05-11

## 2023-05-11 NOTE — PROGRESS NOTES
Subjective   Dianna Clay is a 43 y.o. female.     History of Present Illness   The patient is here today for CPE and lab work F/U. She has only taken the 300 mg of wellbutrin once. She is concerned about nausea, she did not have a problem with this.   She does feel like she can go from one end of the spectrum to the other.     Son dx with bipolar 1, ADHD and BPD, dtr Bipolar 2, BPD, depression-they are seeing psych and doing really well right now.     In her 20s she did see psych and was told she was bipolar. She was put on seroquel and could not get out of bed.     BMI is >= 30 and <35. (Class 1 Obesity). The following options were offered after discussion;: exercise counseling/recommendations and nutrition counseling/recommendations        The following portions of the patient's history were reviewed and updated as appropriate: allergies, current medications, past family history, past medical history, past social history, past surgical history and problem list.    Review of Systems   Constitutional: Negative for chills, fatigue and fever.   HENT: Negative for ear pain, rhinorrhea and sore throat.    Eyes: Negative.    Respiratory: Negative for cough and shortness of breath.    Cardiovascular: Negative.    Gastrointestinal: Negative.    Endocrine: Negative for cold intolerance and heat intolerance.   Genitourinary: Negative for breast discharge, breast lump, breast pain, difficulty urinating, dyspareunia, dysuria, flank pain, frequency, genital sores, hematuria, pelvic pain and urgency.   Musculoskeletal: Negative.    Skin: Negative.    Allergic/Immunologic: Negative for environmental allergies and food allergies.   Neurological: Negative.    Hematological: Negative.    Psychiatric/Behavioral: Positive for depressed mood. Negative for dysphoric mood and suicidal ideas. The patient is nervous/anxious.        Objective   Physical Exam  Constitutional:       Appearance: Normal appearance. She is well-developed.       Comments: Body mass index is 30.98 kg/m².     HENT:      Right Ear: Hearing, tympanic membrane, ear canal and external ear normal.      Left Ear: Hearing, tympanic membrane, ear canal and external ear normal.      Nose: Nose normal.      Mouth/Throat:      Pharynx: Uvula midline.   Eyes:      General: Lids are normal.      Conjunctiva/sclera: Conjunctivae normal.      Pupils: Pupils are equal, round, and reactive to light.   Neck:      Thyroid: No thyroid mass or thyromegaly.      Vascular: No carotid bruit.      Trachea: Trachea normal.   Cardiovascular:      Rate and Rhythm: Normal rate and regular rhythm.      Pulses: Normal pulses.      Heart sounds: Normal heart sounds.   Pulmonary:      Effort: Pulmonary effort is normal.      Breath sounds: Normal breath sounds.   Abdominal:      General: Bowel sounds are normal.      Palpations: Abdomen is soft.      Tenderness: There is no abdominal tenderness.   Musculoskeletal:         General: Normal range of motion.      Cervical back: Normal range of motion.   Lymphadenopathy:      Cervical: No cervical adenopathy.      Upper Body:      Right upper body: No supraclavicular adenopathy.      Left upper body: No supraclavicular adenopathy.      Lower Body: No right inguinal adenopathy. No left inguinal adenopathy.   Skin:     General: Skin is warm and dry.   Neurological:      Mental Status: She is alert and oriented to person, place, and time.      GCS: GCS eye subscore is 4. GCS verbal subscore is 5. GCS motor subscore is 6.      Cranial Nerves: No cranial nerve deficit.      Sensory: No sensory deficit.      Deep Tendon Reflexes:      Reflex Scores:       Patellar reflexes are 2+ on the right side and 2+ on the left side.  Psychiatric:         Speech: Speech normal.         Behavior: Behavior normal. Behavior is cooperative.         Thought Content: Thought content normal.         Judgment: Judgment normal.         Vitals:    05/11/23 0826   BP: 102/68   Pulse: 82    Temp: 98 °F (36.7 °C)   SpO2: 97%     Body mass index is 30.98 kg/m².      Current Outpatient Medications:   •  amphetamine-dextroamphetamine (ADDERALL) 20 MG tablet, Take 1 tablet by mouth 2 (Two) Times a Day., Disp: , Rfl:   •  buPROPion XL (WELLBUTRIN XL) 150 MG 24 hr tablet, Take 1 tablet by mouth Daily., Disp: , Rfl:   •  cetirizine (zyrTEC) 10 MG tablet, Take 1 tablet by mouth Daily., Disp: , Rfl:   •  fluticasone (FLONASE) 50 MCG/ACT nasal spray, 2 sprays into the nostril(s) as directed by provider Daily., Disp: 9.9 mL, Rfl: 11  •  Gabriele Fe 1/20 1-20 MG-MCG per tablet, TAKE 1 TABLET BY MOUTH ONCE DAILY. TAKE CONTINUOUSLY, Disp: , Rfl:   •  ondansetron (Zofran) 4 MG tablet, Take 1 tablet by mouth Every 6 (Six) Hours As Needed for Nausea or Vomiting., Disp: 15 tablet, Rfl: 0  •  pantoprazole (PROTONIX) 40 MG EC tablet, Take 1 tablet by mouth Daily., Disp: 90 tablet, Rfl: 2  •  rizatriptan (Maxalt) 10 MG tablet, Take 1 tablet by mouth 1 (One) Time As Needed for Migraine. May repeat in 2 hours if needed, Disp: 12 tablet, Rfl: 2  •  traZODone (DESYREL) 100 MG tablet, TAKE ONE TABLET BY MOUTH AT BEDTIME, Disp: 90 tablet, Rfl: 0  •  Cariprazine HCl (Vraylar) 1.5 MG capsule capsule, Take 1 capsule by mouth Daily., Disp: 30 capsule, Rfl: 6     Assessment & Plan   Diagnoses and all orders for this visit:    1. Health care maintenance (Primary)    2. Bipolar affective disorder, currently depressed, moderate  -     Cariprazine HCl (Vraylar) 1.5 MG capsule capsule; Take 1 capsule by mouth Daily.  Dispense: 30 capsule; Refill: 6    3. Drug abuse    4. Gastroesophageal reflux disease without esophagitis  -     pantoprazole (PROTONIX) 40 MG EC tablet; Take 1 tablet by mouth Daily.  Dispense: 90 tablet; Refill: 2    Other orders  -     buPROPion XL (WELLBUTRIN XL) 150 MG 24 hr tablet; Take 1 tablet by mouth Daily.               Preventative counseling- work on healthy diet and exercise   1. Bipolar/Depression- try vraylar,  has previously failed proac, paxil and zoloft, wellbutrin, lexapro, seroquel.   Positive mood questionnaire   While taking vraylar can continue wellbutrin at 150 mg only.   If vraylar not approved will place psych referral   2. Drug abuse- she agrees she will not do any more illegal drugs    [facility]N-[facility]

## 2023-05-12 ENCOUNTER — TELEMEDICINE (OUTPATIENT)
Dept: NEUROLOGY | Facility: CLINIC | Age: 43
End: 2023-05-12
Payer: COMMERCIAL

## 2023-05-12 DIAGNOSIS — G43.709 CHRONIC MIGRAINE WITHOUT AURA WITHOUT STATUS MIGRAINOSUS, NOT INTRACTABLE: Primary | ICD-10-CM

## 2023-05-12 DIAGNOSIS — R11.2 NAUSEA AND VOMITING, UNSPECIFIED VOMITING TYPE: ICD-10-CM

## 2023-05-12 LAB
ALBUMIN SERPL-MCNC: 4.3 G/DL (ref 3.5–5.2)
ALBUMIN/GLOB SERPL: 1.8 G/DL
ALP SERPL-CCNC: 45 U/L (ref 39–117)
ALT SERPL-CCNC: 25 U/L (ref 1–33)
AST SERPL-CCNC: 19 U/L (ref 1–32)
BASOPHILS # BLD AUTO: 0.03 10*3/MM3 (ref 0–0.2)
BASOPHILS NFR BLD AUTO: 0.5 % (ref 0–1.5)
BILIRUB SERPL-MCNC: 0.3 MG/DL (ref 0–1.2)
BUN SERPL-MCNC: 14 MG/DL (ref 6–20)
BUN/CREAT SERPL: 16.3 (ref 7–25)
CALCIUM SERPL-MCNC: 9.5 MG/DL (ref 8.6–10.5)
CHLORIDE SERPL-SCNC: 103 MMOL/L (ref 98–107)
CHOLEST SERPL-MCNC: 175 MG/DL (ref 0–200)
CHOLEST/HDLC SERPL: 2.13 {RATIO}
CO2 SERPL-SCNC: 26.2 MMOL/L (ref 22–29)
CREAT SERPL-MCNC: 0.86 MG/DL (ref 0.57–1)
EGFRCR SERPLBLD CKD-EPI 2021: 87.2 ML/MIN/1.73
EOSINOPHIL # BLD AUTO: 0.09 10*3/MM3 (ref 0–0.4)
EOSINOPHIL NFR BLD AUTO: 1.4 % (ref 0.3–6.2)
ERYTHROCYTE [DISTWIDTH] IN BLOOD BY AUTOMATED COUNT: 11.4 % (ref 12.3–15.4)
GLOBULIN SER CALC-MCNC: 2.4 GM/DL
GLUCOSE SERPL-MCNC: 86 MG/DL (ref 65–99)
HCT VFR BLD AUTO: 42 % (ref 34–46.6)
HDLC SERPL-MCNC: 82 MG/DL (ref 40–60)
HGB BLD-MCNC: 14.2 G/DL (ref 12–15.9)
IMM GRANULOCYTES # BLD AUTO: 0.01 10*3/MM3 (ref 0–0.05)
IMM GRANULOCYTES NFR BLD AUTO: 0.2 % (ref 0–0.5)
LDLC SERPL CALC-MCNC: 78 MG/DL (ref 0–100)
LYMPHOCYTES # BLD AUTO: 1.88 10*3/MM3 (ref 0.7–3.1)
LYMPHOCYTES NFR BLD AUTO: 29.3 % (ref 19.6–45.3)
MCH RBC QN AUTO: 31.8 PG (ref 26.6–33)
MCHC RBC AUTO-ENTMCNC: 33.8 G/DL (ref 31.5–35.7)
MCV RBC AUTO: 94.2 FL (ref 79–97)
MONOCYTES # BLD AUTO: 0.34 10*3/MM3 (ref 0.1–0.9)
MONOCYTES NFR BLD AUTO: 5.3 % (ref 5–12)
NEUTROPHILS # BLD AUTO: 4.06 10*3/MM3 (ref 1.7–7)
NEUTROPHILS NFR BLD AUTO: 63.3 % (ref 42.7–76)
NRBC BLD AUTO-RTO: 0 /100 WBC (ref 0–0.2)
PLATELET # BLD AUTO: 197 10*3/MM3 (ref 140–450)
POTASSIUM SERPL-SCNC: 4.4 MMOL/L (ref 3.5–5.2)
PROT SERPL-MCNC: 6.7 G/DL (ref 6–8.5)
RBC # BLD AUTO: 4.46 10*6/MM3 (ref 3.77–5.28)
SODIUM SERPL-SCNC: 140 MMOL/L (ref 136–145)
TRIGL SERPL-MCNC: 85 MG/DL (ref 0–150)
TSH SERPL DL<=0.005 MIU/L-ACNC: 2.73 UIU/ML (ref 0.27–4.2)
VLDLC SERPL CALC-MCNC: 15 MG/DL (ref 5–40)
WBC # BLD AUTO: 6.41 10*3/MM3 (ref 3.4–10.8)

## 2023-05-12 PROCEDURE — 99213 OFFICE O/P EST LOW 20 MIN: CPT | Performed by: PSYCHIATRY & NEUROLOGY

## 2023-05-12 RX ORDER — RIZATRIPTAN BENZOATE 10 MG/1
10 TABLET ORAL ONCE AS NEEDED
Qty: 12 TABLET | Refills: 2 | Status: SHIPPED | OUTPATIENT
Start: 2023-05-12

## 2023-05-12 RX ORDER — ONDANSETRON 4 MG/1
4 TABLET, FILM COATED ORAL EVERY 6 HOURS PRN
Qty: 15 TABLET | Refills: 0 | Status: SHIPPED | OUTPATIENT
Start: 2023-05-12

## 2023-05-12 NOTE — ASSESSMENT & PLAN NOTE
43 year old right handed woman with chronic migraines with more occipital headaches more recently in the setting of increased stress in her personal life.  She reports her migraines starting about 15+ years ago.  Her migraines are located in the back of her head and also front of her head with throbbing quality which she rates as 8-10/10 on pain scale 1-10 with associated light and sound sensitivity and nausea and vomiting at times.  Headaches typically last a couple days and she feels like she has had a constant daily headache over the past 7+ months.  She has tried topiramate 200mg daily which initially helped but had not helped more recently and due to having history of kidney stones this was discontinued.  She was previously on Prozac which has been discontinued and she continues to take bupropion currently.  She has had increased stress in her life.  She also lost her dog.  She has tried Imitrex and Fioricet which she does not think was helpful.  Maxalt seems to be helping better.  She denies family history of migraines.  She had a brain MRI scan done on 4/14/2021 which looks good with some nonspecific white matter changes which can be seen with migraines.  I started her on Emgality following initial visit and she has had 5 doses so far and has not noticed any improvement in her migraines.  She continued to get migraines 15+ migraine days per month and she would like to have ONBs performed.  Instead I tried her on Botox injections for migraines which she reports has been very helpful and she is getting maybe 1-2 migraines per month with her most recent Botox injection which is more than 80% drop in frequency of her migraines.  She uses rizatriptan and Advil Migraine as needed for the occasional migraines.  She is having more migraines due to increased stress in her personal life and would like further assistance with her occipital headaches.  I will set her up for ONBs sooner and Botox as scheduled and will  refill her rizatriptan and zofran for nausea and migraine.

## 2023-05-12 NOTE — PROGRESS NOTES
Chief Complaint  No chief complaint on file.    Subjective          Dianna Clay presents to [facility] NEUROLOGY for   HISTORY OF PRESENT ILLNESS:    Patient is being evaluated via Telehealth utilizing both Video and Audio.      Dianna Clay is a 43 year old right handed woman who is being evaluated via telehealth with with video and audio for follow up evaluation and treatment of chronic migraines.  She reports her migraines starting about 15+ years ago.  Her migraines are located in the back of her head and also front of her head with throbbing quality which she rates as 8-10/10 on pain scale 1-10 with associated light and sound sensitivity and nausea and vomiting at times.  Headaches typically last a couple days and she feels like she has had a constant daily headache over the past 7+ months.  She has tried topiramate 200mg daily which initially helped but had not helped more recently and due to having history of kidney stones this was discontinued.  She was previously on Prozac which has been discontinued and she continues to take bupropion currently.  She has had increased stress in her life.  She also lost her dog.  She has tried Imitrex and Fioricet which she does not think was helpful.  Maxalt seems to be helping better.  She denies family history of migraines.  She had a brain MRI scan done on 4/14/2021 which looks good with some nonspecific white matter changes which can be seen with migraines.  I started her on Emgality following initial visit and she has had 5 doses so far and has not noticed any improvement in her migraines.  She continued to get migraines 15+ migraine days per month and she would like to have ONBs performed.  Instead I tried her on Botox injections for migraines which she reports has been very helpful and she is getting maybe 1-2 migraines per month with her most recent Botox injection which is more than 80% drop in frequency of her migraines.  She uses  rizatriptan and Advil Migraine as needed for the occasional migraines.  She is having more migraines due to increased stress in her personal life and would like further assistance with her occipital headaches.      Past Medical History:   Diagnosis Date   • Anxiety    • Arthritis    • Headache         Family History   Problem Relation Age of Onset   • Thyroid disease Mother    • Colon polyps Father    • Depression Father    • Anxiety disorder Father    • Alcohol abuse Father    • Alcohol abuse Brother    • Thyroid disease Maternal Grandmother    • Heart failure Maternal Grandmother    • No Known Problems Maternal Grandfather    • Anxiety disorder Paternal Grandmother    • Depression Paternal Grandmother    • Alcohol abuse Paternal Grandmother    • Dementia Paternal Grandmother    • Anxiety disorder Paternal Grandfather    • Depression Paternal Grandfather    • Alcohol abuse Paternal Grandfather    • Anxiety disorder Daughter    • Depression Daughter    • Bipolar disorder Daughter    • Bipolar disorder Son    • ADD / ADHD Son    • Thyroid disease Maternal Aunt    • Asthma Maternal Aunt    • Alcohol abuse Paternal Uncle    • Colon cancer Neg Hx    • Crohn's disease Neg Hx    • Ulcerative colitis Neg Hx    • Irritable bowel syndrome Neg Hx         Social History     Socioeconomic History   • Marital status: Single   • Number of children: 2   Tobacco Use   • Smoking status: Former     Packs/day: 0.75     Years: 14.00     Pack years: 10.50     Types: Cigarettes     Quit date: 2011     Years since quittin.0   • Smokeless tobacco: Never   Vaping Use   • Vaping Use: Never used   Substance and Sexual Activity   • Alcohol use: Yes     Comment: 1-2 times a week, 1-2 glasses at a time.    • Drug use: Yes     Comment: cocaine in late teens, she has done some cocaine 3 times in the last 6 wks   • Sexual activity: Yes     Partners: Male     Birth control/protection: I.U.D.        I have personally reviewed the ROS as  stated below.     Review of Systems     Neurological: Negative for dizziness, tremors, seizures, syncope, facial asymmetry, speech difficulty, weakness, light-headedness, numbness, memory problem and confusion.   Hematological: Does not bruise/bleed easily.   Psychiatric/Behavioral: Negative for agitation, behavioral problems, decreased concentration, dysphoric mood, hallucinations, self-injury, sleep disturbance, suicidal ideas, negative for hyperactivity, depressed mood and stress. The patient is nervous/anxious has increased.      Objective   Vital Signs Not obtained as this is a Telehealth Video Visit    PHYSICAL EXAM:    General   Mental Status - Alert. General Appearance - Well developed, Well groomed, Oriented and Cooperative. Orientation - Oriented X3.       Head and Neck  Head - normocephalic, atraumatic with no lesions or palpable masses. Tenderness to palpation over the greater occipital nerve distribution.    Neck    Global Assessment - supple.       Eye   Sclera/Conjunctiva - Bilateral - Normal.    ENMT  Mouth and Throat   Oral Cavity/Oropharynx: Oropharynx - the soft palate,uvula and tongue are normal in appearance.    Neurologic   Mental Status: Speech - Normal. Cognitive function - appropriate fund of knowledge. No impairment of attention, Impairment of concentration, impairment of long term memory or impairment of short term memory.  Cranial Nerves:   II Optic: Visual acuity - Left - Normal. Right - Normal.   VII Facial: - Normal Bilaterally.  VIII Acoustic - Bilateral - Hearing normal and (Hearing tested by finger rub).   IX Glossopharyngeal / X Vagus - Normal.  XI Accessory: Trapezius - Bilateral - Normal. Sternocleidomastoid - Bilateral - Normal.  XII Hypoglossal - Bilateral - Normal.  Eye Movements: - Normal Bilaterally.  Sensory:   Light Touch: Intact - Globally.  Motor:   Bulk and Contour: - Normal.  Tone: - Normal.  Tremor: Not present.  Strength: 5/5 normal muscle strength - All  Muscles.   General Assessment: - Coordination - No Impairment of finger-to-nose or Impairment of rapid alternating movements. Gait - Normal.     Result Review :                 Assessment and Plan    Problem List Items Addressed This Visit        Neuro    Chronic migraine without aura without status migrainosus, not intractable - Primary    Current Assessment & Plan     43 year old right handed woman with chronic migraines with more occipital headaches more recently in the setting of increased stress in her personal life.  She reports her migraines starting about 15+ years ago.  Her migraines are located in the back of her head and also front of her head with throbbing quality which she rates as 8-10/10 on pain scale 1-10 with associated light and sound sensitivity and nausea and vomiting at times.  Headaches typically last a couple days and she feels like she has had a constant daily headache over the past 7+ months.  She has tried topiramate 200mg daily which initially helped but had not helped more recently and due to having history of kidney stones this was discontinued.  She was previously on Prozac which has been discontinued and she continues to take bupropion currently.  She has had increased stress in her life.  She also lost her dog.  She has tried Imitrex and Fioricet which she does not think was helpful.  Maxalt seems to be helping better.  She denies family history of migraines.  She had a brain MRI scan done on 4/14/2021 which looks good with some nonspecific white matter changes which can be seen with migraines.  I started her on Emgality following initial visit and she has had 5 doses so far and has not noticed any improvement in her migraines.  She continued to get migraines 15+ migraine days per month and she would like to have ONBs performed.  Instead I tried her on Botox injections for migraines which she reports has been very helpful and she is getting maybe 1-2 migraines per month with her most  recent Botox injection which is more than 80% drop in frequency of her migraines.  She uses rizatriptan and Advil Migraine as needed for the occasional migraines.  She is having more migraines due to increased stress in her personal life and would like further assistance with her occipital headaches.  I will set her up for ONBs sooner and Botox as scheduled and will refill her rizatriptan and zofran for nausea and migraine.            Relevant Medications    rizatriptan (Maxalt) 10 MG tablet    ondansetron (Zofran) 4 MG tablet   Other Visit Diagnoses     Nausea and vomiting, unspecified vomiting type        Relevant Medications    ondansetron (Zofran) 4 MG tablet          Patient provided consent for Telehealth visit.      This was an audio and video enabled telemedicine encounter.     I performed this clinical encounter via real-time telehealth video connection originating from the patient's location at home and my location at [facility]. Verbal consent to participate in this telehealth video clinical visit was obtained and any questions by the patient regarding the interaction were answered.  This visit occurred during the coronavirus (Covid-19) Public Health Emergency.         Follow Up   No follow-ups on file.  Patient was given instructions and counseling regarding her condition or for health maintenance advice. Please see specific information pulled into the AVS if appropriate.

## 2023-05-19 ENCOUNTER — TELEPHONE (OUTPATIENT)
Dept: INTERNAL MEDICINE | Facility: CLINIC | Age: 43
End: 2023-05-19
Payer: COMMERCIAL

## 2023-05-19 NOTE — TELEPHONE ENCOUNTER
Caller: Liberty Clay    Relationship: Self    Best call back number: [phone]    What was the call regarding: PATIENT STATED THAT SHE STARTED A NEW MEDICATION PRESCRIBED BY LIBERTY WADE. PATIENT STATED SHE TAKES Cariprazine HCl (Vraylar) 1.5 MG capsule capsule AND buPROPion XL (WELLBUTRIN XL) 150 MG 24 hr tablet. PATIENT STATED IN THE LAST FOUR TO FIVE DAYS, MAYBE MORE, SHE HAS BEEN CONSTIPATED. PATIENT STATED THAT THIS IS SOMETHING SHE DOES NOT NORMALLY EXPERIENCE. PATIENT STATED HER STOMACH IS BLOATED RIGHT UNDER HER BREAST. PATIENT STATED THAT SHE HAS TRIED A SENNOSIDE VEGETABLE LAXATIVE AND STOOL SOFTENER AND THAT IS NOT WORKING. PLEASE ADVISE.    Do you require a callback: YES

## 2023-05-19 NOTE — TELEPHONE ENCOUNTER
Called and informed patient that she can try Miralax OTC to see if that helps her constipation. She said that she will continue to drink plenty of water and try the Miralax. Patient will send a message Monday if she does not see any improvement in constipation over the weekend.

## 2023-05-23 RX ORDER — TRAZODONE HYDROCHLORIDE 100 MG/1
150 TABLET ORAL
Qty: 135 TABLET | Refills: 0 | Status: SHIPPED | OUTPATIENT
Start: 2023-05-23 | End: 2023-08-21

## 2023-05-23 RX ORDER — MINOCYCLINE HYDROCHLORIDE 100 MG/1
100 CAPSULE ORAL 2 TIMES DAILY
Qty: 60 CAPSULE | Refills: 5 | Status: SHIPPED | OUTPATIENT
Start: 2023-05-23 | End: 2023-06-22

## 2023-05-25 ENCOUNTER — OFFICE VISIT (OUTPATIENT)
Dept: ORTHOPEDIC SURGERY | Facility: CLINIC | Age: 43
End: 2023-05-25
Payer: COMMERCIAL

## 2023-05-25 VITALS
BODY MASS INDEX: 29.99 KG/M2 | HEART RATE: 74 BPM | WEIGHT: 180 LBS | HEIGHT: 65 IN | SYSTOLIC BLOOD PRESSURE: 107 MMHG | DIASTOLIC BLOOD PRESSURE: 70 MMHG

## 2023-05-25 DIAGNOSIS — M17.12 PRIMARY OSTEOARTHRITIS OF LEFT KNEE: ICD-10-CM

## 2023-05-25 DIAGNOSIS — M17.11 PRIMARY OSTEOARTHRITIS OF RIGHT KNEE: Primary | ICD-10-CM

## 2023-05-25 RX ORDER — LIDOCAINE HYDROCHLORIDE 10 MG/ML
4 INJECTION, SOLUTION EPIDURAL; INFILTRATION; INTRACAUDAL; PERINEURAL
Status: COMPLETED | OUTPATIENT
Start: 2023-05-25 | End: 2023-05-25

## 2023-05-25 RX ORDER — MELOXICAM 15 MG/1
15 TABLET ORAL DAILY
Qty: 30 TABLET | Refills: 2 | Status: SHIPPED | OUTPATIENT
Start: 2023-05-25

## 2023-05-25 RX ORDER — TRIAMCINOLONE ACETONIDE 40 MG/ML
80 INJECTION, SUSPENSION INTRA-ARTICULAR; INTRAMUSCULAR
Status: COMPLETED | OUTPATIENT
Start: 2023-05-25 | End: 2023-05-25

## 2023-05-25 RX ADMIN — TRIAMCINOLONE ACETONIDE 80 MG: 40 INJECTION, SUSPENSION INTRA-ARTICULAR; INTRAMUSCULAR at 10:57

## 2023-05-25 RX ADMIN — LIDOCAINE HYDROCHLORIDE 4 ML: 10 INJECTION, SOLUTION EPIDURAL; INFILTRATION; INTRACAUDAL; PERINEURAL at 10:57

## 2023-05-25 NOTE — PROGRESS NOTES
Subjective:     Patient ID: Dianna Clay is a 43 y.o. female.    Chief Complaint:    History of Present Illness  Dianna Clay presents to clinic today for evaluation of bilateral knee pain.  The patient has seen me in the past for cervical spine issues and shoulder pain.  She states she had mention in passing at 1 point that she had bad knees.  She is here today for further evaluation management.  She states that the knee pain is worse with activity and she notices popping and cracking in her knees but mainly with range of motion.  She was told 4 to 6 years ago that she would need knee replacements but has never sought any formal treatment.  Patient states that she is very active and does kickboxing 5 days a week and would like to return to running.     Social History     Occupational History   • Occupation:    Tobacco Use   • Smoking status: Former     Packs/day: 0.75     Years: 14.00     Pack years: 10.50     Types: Cigarettes     Quit date: 2011     Years since quittin.0     Passive exposure: Never   • Smokeless tobacco: Never   Vaping Use   • Vaping Use: Never used   Substance and Sexual Activity   • Alcohol use: Yes     Comment: 1-2 times a week, 1-2 glasses at a time.    • Drug use: Yes     Comment: cocaine in late teens, she has done some cocaine 3 times in the last 6 wks   • Sexual activity: Yes     Partners: Male     Birth control/protection: I.U.D.      Past Medical History:   Diagnosis Date   • Anxiety    • Arthritis    • Headache      Past Surgical History:   Procedure Laterality Date   • ABDOMINOPLASTY     • BREAST AUGMENTATION     • PATELLA REALIGNMENT         Family History   Problem Relation Age of Onset   • Thyroid disease Mother    • Colon polyps Father    • Depression Father    • Anxiety disorder Father    • Alcohol abuse Father    • Alcohol abuse Brother    • Thyroid disease Maternal Grandmother    • Heart failure Maternal Grandmother    • No Known  "Problems Maternal Grandfather    • Anxiety disorder Paternal Grandmother    • Depression Paternal Grandmother    • Alcohol abuse Paternal Grandmother    • Dementia Paternal Grandmother    • Anxiety disorder Paternal Grandfather    • Depression Paternal Grandfather    • Alcohol abuse Paternal Grandfather    • Anxiety disorder Daughter    • Depression Daughter    • Bipolar disorder Daughter    • Bipolar disorder Son    • ADD / ADHD Son    • Thyroid disease Maternal Aunt    • Asthma Maternal Aunt    • Alcohol abuse Paternal Uncle    • Colon cancer Neg Hx    • Crohn's disease Neg Hx    • Ulcerative colitis Neg Hx    • Irritable bowel syndrome Neg Hx                  Objective:  Vitals:    05/25/23 1019   BP: 107/70   BP Location: Right arm   Pulse: 74   Weight: 81.6 kg (180 lb)   Height: 165.1 cm (65\")         05/25/23  1019   Weight: 81.6 kg (180 lb)     Body mass index is 29.95 kg/m².        Right Knee Exam     Tenderness   The patient is experiencing tenderness in the patella, lateral joint line, lateral retinaculum, medial joint line and medial retinaculum.    Range of Motion   Extension: 0   Flexion: 130     Tests   Patti:  Medial - negative Lateral - negative  Varus: negative Valgus: negative  Drawer:  Anterior - negative    Posterior - negative    Other   Erythema: absent  Scars: absent  Sensation: normal  Pulse: present  Swelling: none  Effusion: no effusion present    Comments:  Palpable and audible patellar crepitus      Left Knee Exam     Tenderness   The patient is experiencing tenderness in the patella, medial retinaculum, medial joint line, lateral joint line and lateral retinaculum.    Range of Motion   Extension: 0   Flexion: 130     Tests   Patti:  Medial - negative Lateral - negative  Varus: negative Valgus: negative  Drawer:  Anterior - negative     Posterior - negative    Other   Erythema: absent  Scars: present  Sensation: normal  Swelling: none  Effusion: no effusion present    Comments:  " Palpable and audible patellar crepitus.  Scar from prior patellar realignment surgery as a child               Imaging: Standing views of bilateral knees were ordered and reviewed by myself in the office today  Indication: Bilateral knee pain  Findings: X-rays demonstrate severe patellofemoral and moderate medial and lateral compartment osteoarthritis.  There are marginal osteophytes subchondral sclerosis and cystic changes.  Comparative studies:     Assessment:        1. Primary osteoarthritis of right knee    2. Primary osteoarthritis of left knee           Plan:      Large Joint Arthrocentesis  Date/Time: 5/25/2023 10:57 AM  Consent given by: patient  Site marked: site marked  Timeout: Immediately prior to procedure a time out was called to verify the correct patient, procedure, equipment, support staff and site/side marked as required   Supporting Documentation  Indications: pain   Procedure Details  Location: knee - Knee joint: antonia knee.  Preparation: Patient was prepped and draped in the usual sterile fashion  Needle size: 22 G  Approach: anterolateral  Medications administered: 4 mL lidocaine PF 1% 1 %; 80 mg triamcinolone acetonide 40 MG/ML  Patient tolerance: patient tolerated the procedure well with no immediate complications            1. Discussed treatment options at length with patient at today's visit.  Discussed with the patient that she has developed moderate to severe osteoarthritis for age.  I discussed with the patient that the mainstays of conservative treatment for knee OA include physical therapy, nonsteroidal anti-inflammatories, injections, activity modification, and home exercises.  The patient states that they  would like to try intra-articular corticosteroid injections bilaterally today.  I discussed that the injections contain lidocaine and Kenalog the goal is to decrease inflammation and subsequently decrease pain.  I discussed that she may get injections every 3 months but not sooner  than that.  At this point time the patient does not need to schedule follow-up with me today.  I am happy to see the patient back at any point time however if issues arise or they fail to make a full recovery.  Additionally the patient states that she has been taking ibuprofen intermittently but like to try something different.  I recommended meloxicam 15 mg p.o. daily not as needed.  This was called into her pharmacy with 2 refills.  2. Follow up: GLENN Clay was in agreement with plan and had all questions answered.     Medications:  New Medications Ordered This Visit   Medications   • meloxicam (MOBIC) 15 MG tablet     Sig: Take 1 tablet by mouth Daily.     Dispense:  30 tablet     Refill:  2       Followup:  No follow-ups on file.    Diagnoses and all orders for this visit:    1. Primary osteoarthritis of right knee (Primary)  -     XR Knee 3 View Bilateral    2. Primary osteoarthritis of left knee  -     XR Knee 3 View Bilateral    Other orders  -     meloxicam (MOBIC) 15 MG tablet; Take 1 tablet by mouth Daily.  Dispense: 30 tablet; Refill: 2  -     Large Joint Arthrocentesis          Dictated utilizing Dragon dictation

## 2023-05-31 ENCOUNTER — TELEPHONE (OUTPATIENT)
Dept: NEUROLOGY | Facility: CLINIC | Age: 43
End: 2023-05-31

## 2023-05-31 DIAGNOSIS — G43.709 CHRONIC MIGRAINE WITHOUT AURA WITHOUT STATUS MIGRAINOSUS, NOT INTRACTABLE: ICD-10-CM

## 2023-05-31 DIAGNOSIS — R11.2 NAUSEA AND VOMITING, UNSPECIFIED VOMITING TYPE: ICD-10-CM

## 2023-05-31 RX ORDER — BUPROPION HYDROCHLORIDE 150 MG/1
TABLET ORAL
Qty: 90 TABLET | Refills: 1 | Status: SHIPPED | OUTPATIENT
Start: 2023-05-31

## 2023-05-31 RX ORDER — ONDANSETRON 4 MG/1
TABLET, FILM COATED ORAL
Qty: 30 TABLET | Refills: 0 | Status: SHIPPED | OUTPATIENT
Start: 2023-05-31

## 2023-05-31 NOTE — TELEPHONE ENCOUNTER
URGENT    PATIENT IS LEAVING FLYING OUT THIS MORNING NEEDS TO  AND TAKE WITH HER    Medication requested (name and dose):     ONDANSETRON 4 MG TABLET    Pharmacy where request should be sent:     [facility] [address] [place], [place] [address]Y - [phone]  - [phone]      Additional details provided by patient: NO REFILLS - PHARMACY NEEDS A CALL    Best call back number:  [phone]    Does the patient have less than a 3 day supply:  [x] Yes  [] No    Terri Thurman Rep  05/31/23,    605}

## 2023-05-31 NOTE — TELEPHONE ENCOUNTER
Spoke with patient and she states that she takes one every day.  You sent refill on 5.12.23 for #15 and she has already taken them all    Patient states that she is nauseated and has a HA every day    Please advise

## 2023-06-02 ENCOUNTER — TELEPHONE (OUTPATIENT)
Dept: NEUROLOGY | Facility: CLINIC | Age: 43
End: 2023-06-02

## 2023-06-02 NOTE — TELEPHONE ENCOUNTER
Caller:LIBERTY  Relationship to Patient: SELF  Phone Number: [phone]    Reason for Call: PT CALLED TO CHECK ON THE STATUS OF THE AUTHERIZATION OF HER ONB. PLEASE REVIEW AND ADVISE. THANK YOU.

## 2023-06-05 NOTE — TELEPHONE ENCOUNTER
Received letter today for denial. Sent pt [employer]. If pt calls back please inform her this was denied after appeal and peer to peer.

## 2023-06-08 ENCOUNTER — TELEPHONE (OUTPATIENT)
Dept: INTERNAL MEDICINE | Facility: CLINIC | Age: 43
End: 2023-06-08

## 2023-06-08 ENCOUNTER — TELEMEDICINE (OUTPATIENT)
Dept: INTERNAL MEDICINE | Facility: CLINIC | Age: 43
End: 2023-06-08
Payer: COMMERCIAL

## 2023-06-08 DIAGNOSIS — F51.01 PRIMARY INSOMNIA: Primary | ICD-10-CM

## 2023-06-08 DIAGNOSIS — F31.32 BIPOLAR AFFECTIVE DISORDER, CURRENTLY DEPRESSED, MODERATE: ICD-10-CM

## 2023-06-08 PROCEDURE — 99214 OFFICE O/P EST MOD 30 MIN: CPT | Performed by: NURSE PRACTITIONER

## 2023-06-08 NOTE — PROGRESS NOTES
Subjective   Dianna Clay is a 43 y.o. female.      History of Present Illness   The patient is here today to F/U on lab work, bipolar/depression.   At last visit Vraylar started, she is getting it for $0. Would like to try higher dose. She does note some constipation. She is using some stool softeners. She has had to use an enema a few times.   Still with some insomnia, the trazodone is not helping as much.   She and her boyfriend have broken up over the weekend.   She has been dreaming about her  Dad.   The following portions of the patient's history were reviewed and updated as appropriate: allergies, current medications, past family history, past medical history, past social history, past surgical history and problem list.    Review of Systems   Constitutional: Negative.    Respiratory: Negative.     Cardiovascular: Negative.    Gastrointestinal:  Positive for constipation. Negative for anal bleeding and blood in stool.   Psychiatric/Behavioral:  Positive for depressed mood. Negative for suicidal ideas. The patient is nervous/anxious.      Objective   Physical Exam  Constitutional:       Appearance: Normal appearance.   Pulmonary:      Effort: Pulmonary effort is normal.   Neurological:      General: No focal deficit present.      Mental Status: She is alert.   Psychiatric:         Mood and Affect: Mood normal.         Cognition and Memory: Cognition and memory normal.         Judgment: Judgment normal.       There were no vitals filed for this visit.  There is no height or weight on file to calculate BMI.      Assessment & Plan   Diagnoses and all orders for this visit:    1. Primary insomnia (Primary)  -     Ambulatory Referral to Psychiatry    2. Bipolar affective disorder, currently depressed, moderate  -     Cariprazine HCl (Vraylar) 3 MG capsule capsule; Take 1 capsule by mouth Daily.  Dispense: 90 capsule; Refill: 1  -     Ambulatory Referral to Psychiatry        Current Outpatient  Medications:     amphetamine-dextroamphetamine (ADDERALL) 20 MG tablet, Take 1 tablet by mouth 2 (Two) Times a Day., Disp: , Rfl:     buPROPion XL (WELLBUTRIN XL) 150 MG 24 hr tablet, Take 1 tablet by mouth once daily, Disp: 90 tablet, Rfl: 1    Cariprazine HCl (Vraylar) 3 MG capsule capsule, Take 1 capsule by mouth Daily., Disp: 90 capsule, Rfl: 1    cetirizine (zyrTEC) 10 MG tablet, Take 1 tablet by mouth Daily., Disp: , Rfl:     fluticasone (FLONASE) 50 MCG/ACT nasal spray, 2 sprays into the nostril(s) as directed by provider Daily., Disp: 9.9 mL, Rfl: 11    Gabriele Fe 1/20 1-20 MG-MCG per tablet, TAKE 1 TABLET BY MOUTH ONCE DAILY. TAKE CONTINUOUSLY, Disp: , Rfl:     meloxicam (MOBIC) 15 MG tablet, Take 1 tablet by mouth Daily., Disp: 30 tablet, Rfl: 2    minocycline (Minocin) 100 MG capsule, Take 1 capsule by mouth 2 (Two) Times a Day for 30 days., Disp: 60 capsule, Rfl: 5    ondansetron (Zofran) 4 MG tablet, TAKE ONE TABLET DAILY, Disp: 30 tablet, Rfl: 0    pantoprazole (PROTONIX) 40 MG EC tablet, Take 1 tablet by mouth Daily., Disp: 90 tablet, Rfl: 2    rizatriptan (Maxalt) 10 MG tablet, Take 1 tablet by mouth 1 (One) Time As Needed for Migraine. May repeat in 2 hours if needed, Disp: 12 tablet, Rfl: 2    traZODone (DESYREL) 100 MG tablet, Take 1.5 tablets by mouth every night at bedtime for 90 days., Disp: 135 tablet, Rfl: 0          Video visit, pt is at home, I am in office, pt consents, doximity, visit lasted 15 minutes.     1. Bipolar/depression- continue wellbutrin and increase vraylar to 3 mg daily, ok to use miralax  2. Insomnia- do not suggest going up on meds, will send referral to Dr. Grimaldo, sleep psych    Suggest check in in 6 months with CMP and visit.   Pt will speak with father's MD about genetic testing they discussed r/t Dad's liver failure.

## 2023-06-14 ENCOUNTER — PROCEDURE VISIT (OUTPATIENT)
Dept: NEUROLOGY | Facility: CLINIC | Age: 43
End: 2023-06-14
Payer: COMMERCIAL

## 2023-06-14 DIAGNOSIS — G43.719 INTRACTABLE CHRONIC MIGRAINE WITHOUT AURA AND WITHOUT STATUS MIGRAINOSUS: Primary | ICD-10-CM

## 2023-06-14 PROCEDURE — 64615 CHEMODENERV MUSC MIGRAINE: CPT | Performed by: PSYCHIATRY & NEUROLOGY

## 2023-06-14 NOTE — PROGRESS NOTES
Procedure   Procedures     Botox injection: Botox injection for neuromuscular block.  Indication: Chronic migraines.  Risks, benefits and alternatives were discussed with the patient.  EMG guidance was not used in identifying the injection site.  Procerus: 5 units was injected.  : 5 units was injected on the right and 5 units injected on the left.  Frontalis: 10 units injected on the right and 10 units injected on the left.  Temporalis: 20 units injected on the right and 20 units injected on the left.  Occipitalis: 15 units injected on the right and 15 units injected on the left.  Cervical paraspinal: 10 units injected on the right and 10 units injected on the left.  Trapezius: 15 units injected on the right and 15 units injected on the left.  200 unit vial, 1 vials, 45 wasted and 155 used.  The patient tolerated the procedure well.  There were no complications.  Patient instructions: The patient was instructed that they may experience localized discomfort over the next 12 to 24 hours and may take over the counter pain medication if needed.  Follow-up in the office in 3 months for next Botox injection.     Risks, benefits, and potential side effects of Botox were discussed with her and she consented to the procedure.      [employer]

## 2023-08-01 RX ORDER — TRAZODONE HYDROCHLORIDE 100 MG/1
TABLET ORAL
Qty: 135 TABLET | Refills: 0 | Status: SHIPPED | OUTPATIENT
Start: 2023-08-01

## 2023-08-10 ENCOUNTER — TELEPHONE (OUTPATIENT)
Dept: ORTHOPEDIC SURGERY | Facility: CLINIC | Age: 43
End: 2023-08-10
Payer: COMMERCIAL

## 2023-08-10 ENCOUNTER — TELEPHONE (OUTPATIENT)
Dept: ORTHOPEDIC SURGERY | Facility: CLINIC | Age: 43
End: 2023-08-10

## 2023-08-10 RX ORDER — NAPROXEN 500 MG/1
500 TABLET ORAL 2 TIMES DAILY WITH MEALS
Qty: 180 TABLET | Refills: 3 | Status: SHIPPED | OUTPATIENT
Start: 2023-08-10

## 2023-08-10 NOTE — TELEPHONE ENCOUNTER
Caller: Dianna Clay    Relationship to patient: Self    Best call back number: [phone] (home)     Chief complaint: BILATERAL KNEE PAIN    Type of visit: CORTISONE INJECTIONS  LAST INJ: 5/25/23    Requested date: THE END OF AUGUST

## 2023-08-10 NOTE — TELEPHONE ENCOUNTER
Rx Refill Note  Requested Prescriptions     Pending Prescriptions Disp Refills    naproxen (NAPROSYN) 500 MG tablet 180 tablet 3     Sig: Take 1 tablet by mouth 2 (Two) Times a Day With Meals.      Last office visit with prescribing clinician: 5/25/2023      Next office visit with prescribing clinician: 8/29/2023     Jacqueline Shearer MA  08/10/23, 15:36 EDT    Previous RX pended for your approval, change or denial.     {TIP  Encounters:    {TIP  Please add Last Relevant Lab Date if appropriate:  {TIP  Is Refill Pharmacy correct?:

## 2023-08-10 NOTE — TELEPHONE ENCOUNTER
Caller: Dianna Clay    Relationship: Self    Best call back number: [phone] (home)       What was the call regarding: THE PATIENT WOULD LIKE TO KNOW IF DR ZAVALETA CAN INCREASE THE DOSE OF HER MELOXICAM. SHE STATED HER KNEE ARE HURTING REALLY BAD.

## 2023-08-29 ENCOUNTER — OFFICE VISIT (OUTPATIENT)
Dept: ORTHOPEDIC SURGERY | Facility: CLINIC | Age: 43
End: 2023-08-29
Payer: COMMERCIAL

## 2023-08-29 VITALS — BODY MASS INDEX: 29.99 KG/M2 | HEIGHT: 65 IN | WEIGHT: 180 LBS

## 2023-08-29 DIAGNOSIS — M17.11 PRIMARY OSTEOARTHRITIS OF RIGHT KNEE: Primary | ICD-10-CM

## 2023-08-29 DIAGNOSIS — M17.12 PRIMARY OSTEOARTHRITIS OF LEFT KNEE: ICD-10-CM

## 2023-08-29 RX ORDER — TRIAMCINOLONE ACETONIDE 40 MG/ML
80 INJECTION, SUSPENSION INTRA-ARTICULAR; INTRAMUSCULAR
Status: COMPLETED | OUTPATIENT
Start: 2023-08-29 | End: 2023-08-29

## 2023-08-29 RX ORDER — LIDOCAINE HYDROCHLORIDE 10 MG/ML
4 INJECTION, SOLUTION EPIDURAL; INFILTRATION; INTRACAUDAL; PERINEURAL
Status: COMPLETED | OUTPATIENT
Start: 2023-08-29 | End: 2023-08-29

## 2023-08-29 RX ADMIN — LIDOCAINE HYDROCHLORIDE 4 ML: 10 INJECTION, SOLUTION EPIDURAL; INFILTRATION; INTRACAUDAL; PERINEURAL at 15:46

## 2023-08-29 RX ADMIN — TRIAMCINOLONE ACETONIDE 80 MG: 40 INJECTION, SUSPENSION INTRA-ARTICULAR; INTRAMUSCULAR at 15:46

## 2023-08-29 NOTE — PROGRESS NOTES
Subjective:     Patient ID: Dianna Clay is a 43 y.o. female.    Chief Complaint:    History of Present Illness  Dianna Clay presents to clinic today for evaluation of bilateral knee pain.  The patient states that following the last injection she had about 3 weeks of 110 pain relief.  It subsequently wore off and the pain is returned.  She states that she has been trying to lose a significant amount of weight and has been successful in doing so by running and doing kickboxing.  She does worry that when she kick boxes that she has noticed some clicking and catching of her kneecap particular on the right side and she is worried that she is only dislocated right kneecap like she did her left in the past.  She is hopeful to get another round of injections today.     Social History     Occupational History    Occupation:    Tobacco Use    Smoking status: Former     Packs/day: 0.75     Years: 13.00     Pack years: 9.75     Types: Cigarettes     Start date: 1998     Quit date: 2011     Years since quittin.3     Passive exposure: Never    Smokeless tobacco: Never    Tobacco comments:     Off and on, was not consecutive time smoker   Vaping Use    Vaping Use: Never used   Substance and Sexual Activity    Alcohol use: Yes     Alcohol/week: 2.0 standard drinks     Types: 2 Glasses of wine per week     Comment: Do not drink much    Drug use: Not Currently     Comment: cocaine in late teens, she has done some cocaine 3 times in the last 6 wks    Sexual activity: Yes     Partners: Male     Birth control/protection: I.U.D.      Past Medical History:   Diagnosis Date    Anxiety     Arthritis     Arthritis of neck     Headache     Knee swelling      Past Surgical History:   Procedure Laterality Date    ABDOMINOPLASTY      BREAST AUGMENTATION      PATELLA REALIGNMENT         Family History   Problem Relation Age of Onset    Thyroid disease Mother     Colon polyps Father      "Depression Father     Anxiety disorder Father     Alcohol abuse Father     Alcohol abuse Brother     Thyroid disease Maternal Grandmother     Heart failure Maternal Grandmother     No Known Problems Maternal Grandfather     Anxiety disorder Paternal Grandmother     Depression Paternal Grandmother     Alcohol abuse Paternal Grandmother     Dementia Paternal Grandmother     Anxiety disorder Paternal Grandfather     Depression Paternal Grandfather     Alcohol abuse Paternal Grandfather     Anxiety disorder Daughter     Depression Daughter     Bipolar disorder Daughter     Bipolar disorder Son     ADD / ADHD Son     Thyroid disease Maternal Aunt     Asthma Maternal Aunt     Alcohol abuse Paternal Uncle     Colon cancer Neg Hx     Crohn's disease Neg Hx     Ulcerative colitis Neg Hx     Irritable bowel syndrome Neg Hx                  Objective:  Vitals:    08/29/23 1512   Weight: 81.6 kg (180 lb)   Height: 165.1 cm (65\")         08/29/23  1512   Weight: 81.6 kg (180 lb)     Body mass index is 29.95 kg/mý.        Right Knee Exam     Tenderness   The patient is experiencing tenderness in the patella, medial retinaculum, lateral joint line, lateral retinaculum and medial joint line.    Range of Motion   Extension:  0   Flexion:  130     Tests   Patti:  Medial - positive Lateral - negative  Varus: negative Valgus: negative  Lachman:  Anterior - negative    Posterior - negative  Drawer:  Anterior - negative    Posterior - negative  Patellar apprehension: positive    Other   Erythema: absent  Scars: absent  Sensation: normal  Pulse: present  Swelling: none  Effusion: no effusion present      Left Knee Exam     Tenderness   The patient is experiencing tenderness in the patella, medial retinaculum, medial joint line, lateral retinaculum and lateral joint line.    Range of Motion   Extension:  0   Flexion:  130     Tests   Patti:  Medial - positive Lateral - negative  Varus: negative Valgus: negative  Lachman:  Anterior - " negative      Drawer:  Anterior - negative     Posterior - negative  Patellar apprehension: positive    Other   Erythema: absent  Scars: present  Sensation: normal  Pulse: present  Swelling: none  Effusion: no effusion present             Imaging: no new    Assessment:        1. Primary osteoarthritis of right knee    2. Primary osteoarthritis of left knee           Plan:  - Large Joint Arthrocentesis: bilateral knee on 8/29/2023 3:46 PM  Indications: pain  Details: 22 G needle, anterolateral approach  Medications (Right): 80 mg triamcinolone acetonide 40 MG/ML; 4 mL lidocaine PF 1% 1 %  Medications (Left): 80 mg triamcinolone acetonide 40 MG/ML; 4 mL lidocaine PF 1% 1 %  Outcome: tolerated well, no immediate complications  Procedure, treatment alternatives, risks and benefits explained, specific risks discussed. Immediately prior to procedure a time out was called to verify the correct patient, procedure, equipment, support staff and site/side marked as required. Patient was prepped and draped in the usual sterile fashion.               Discussed treatment options at length with patient at today's visit.  Discussed with the patient that she has developed bilateral knee osteoarthritis most pronounced in patellofemoral joint but definitely involving all 3 compartments. I discussed with the patient that the mainstays of conservative treatment for knee OA include physical therapy, nonsteroidal anti-inflammatories, injections, activity modification, and home exercises.  The patient states that they  would like to try another round of intra-articular corticosteroid injections.  I did discuss patella stabilizing braces with the patient but this will likely aggravated patellofemoral OA and pain worse.  She does not have any restrictions from the standpoint may continue kickboxing.  I did educated her on how to reduce her patella in the unlikely event that it does dislocate.  At this point time the patient does not need to  schedule follow-up with me today.  I am happy to see the patient back at any point time however if issues arise or they fail to make a full recovery.  Follow up: PRN but not before November 29, 2023 if she would like another injection      Dianna KENDRICK Danna was in agreement with plan and had all questions answered.     Medications:  No orders of the defined types were placed in this encounter.      Followup:  No follow-ups on file.    Diagnoses and all orders for this visit:    1. Primary osteoarthritis of right knee (Primary)    2. Primary osteoarthritis of left knee    Other orders  -     - Large Joint Arthrocentesis: bilateral knee          Dictated utilizing Dragon dictation

## 2023-09-13 ENCOUNTER — PROCEDURE VISIT (OUTPATIENT)
Dept: NEUROLOGY | Facility: CLINIC | Age: 43
End: 2023-09-13
Payer: COMMERCIAL

## 2023-09-13 DIAGNOSIS — G43.709 CHRONIC MIGRAINE WITHOUT AURA WITHOUT STATUS MIGRAINOSUS, NOT INTRACTABLE: Primary | ICD-10-CM

## 2023-09-13 RX ORDER — FLUCONAZOLE 150 MG/1
150 TABLET ORAL ONCE
COMMUNITY
Start: 2023-09-07

## 2023-10-23 RX ORDER — TRAZODONE HYDROCHLORIDE 100 MG/1
TABLET ORAL
Qty: 135 TABLET | Refills: 0 | Status: SHIPPED | OUTPATIENT
Start: 2023-10-23

## 2023-11-28 ENCOUNTER — TELEPHONE (OUTPATIENT)
Dept: INTERNAL MEDICINE | Facility: CLINIC | Age: 43
End: 2023-11-28

## 2023-11-28 NOTE — TELEPHONE ENCOUNTER
Caller: Liberty Clay    Relationship: Self    Best call back number: [phone]     Which medication are you concerned about: Cariprazine HCl (Vraylar) 3 MG capsule capsule     Who prescribed you this medication: LIBERTY WADE    What are your concerns: PATIENT STATED DUE TO INSURANCE CHANGES SHE IS NOW UNABLE TO GET THIS MEDICATION.    PATIENT STATED DUE TO THIS SHE HAS BEEN WITHOUT THIS MEDICATION FOR THREE WEEKS.    PATIENT STATED SHE IS EXPERIENCING SYMPTOMS OF DEPRESSION.    PATIENT IS REQUESTING TO DISCUSS THIS WITH LIBERTY WADE.    PATIENT IS REQUESTING TO KNOW IF THERE IS AN ALTERNATIVE MEDICATION SHE MAY BE PRESCRIBED OR IF THERE ARE SAMPLES AVAILABLE SHE MAY USE.    PLEASE CALL TO DISCUSS AND ADVISE.

## 2023-11-30 ENCOUNTER — TELEMEDICINE (OUTPATIENT)
Dept: INTERNAL MEDICINE | Facility: CLINIC | Age: 43
End: 2023-11-30
Payer: COMMERCIAL

## 2023-11-30 VITALS — HEIGHT: 65 IN | BODY MASS INDEX: 29.95 KG/M2

## 2023-11-30 DIAGNOSIS — F32.89 OTHER DEPRESSION: Primary | ICD-10-CM

## 2023-11-30 PROCEDURE — 99213 OFFICE O/P EST LOW 20 MIN: CPT | Performed by: NURSE PRACTITIONER

## 2023-11-30 NOTE — PROGRESS NOTES
Subjective   Dianna Clay is a 43 y.o. female.     History of Present Illness    The patient is here today with c/o depression. Recently out of vraylar due to insurance no longer covering it. Requesting change of med. She has been off the vraylar for 3 weeks. She feels like sometimes it would be easier if she wasn't here.   Now on private insurance, not good medical coverage.     She has been on mult SSRIs and SNRIs in the past with out adequate relief of symptoms.     Her son turned 18 this week, he moved out and it has been really bad.   First randee with out Dad.   The following portions of the patient's history were reviewed and updated as appropriate: allergies, current medications, past family history, past medical history, past social history, past surgical history and problem list.    Review of Systems   Constitutional: Negative.    Respiratory: Negative.     Cardiovascular: Negative.    Psychiatric/Behavioral:  Positive for depressed mood. Negative for suicidal ideas. The patient is nervous/anxious.        Objective   Physical Exam  Constitutional:       Appearance: Normal appearance. She is well-developed.   Neck:      Thyroid: No thyromegaly.   Cardiovascular:      Rate and Rhythm: Normal rate and regular rhythm.      Heart sounds: Normal heart sounds.   Pulmonary:      Effort: Pulmonary effort is normal.      Breath sounds: Normal breath sounds.   Musculoskeletal:      Cervical back: Normal range of motion and neck supple.   Lymphadenopathy:      Cervical: No cervical adenopathy.   Skin:     General: Skin is warm and dry.   Neurological:      Mental Status: She is alert.   Psychiatric:         Mood and Affect: Mood is depressed. Affect is tearful.         Behavior: Behavior normal.         Thought Content: Thought content normal.         Judgment: Judgment normal.         There were no vitals filed for this visit.  Body mass index is 29.95 kg/m².      Assessment & Plan   Diagnoses and all orders  for this visit:    1. Other depression (Primary)  -     Ambulatory Referral to Psychiatry    Other orders  -     Cariprazine HCl (Vraylar) 1.5 MG capsule capsule; Take 1 capsule by mouth Daily.  Dispense: 45 capsule; Refill: 0                 1. Depression- pt has tried and previously failed mult generic anti depressants. Vraylar has worked the best and the longest for her. Will give samples until pt can get in to psych. Discussed may be able to find a generic mood stabilizer that is better covered with insurance. Currently does not appear to be a harm to self or others.

## 2024-01-18 RX ORDER — TRAZODONE HYDROCHLORIDE 100 MG/1
TABLET ORAL
Qty: 135 TABLET | Refills: 0 | Status: SHIPPED | OUTPATIENT
Start: 2024-01-18

## 2024-01-25 ENCOUNTER — OFFICE VISIT (OUTPATIENT)
Dept: ORTHOPEDIC SURGERY | Facility: CLINIC | Age: 44
End: 2024-01-25
Payer: COMMERCIAL

## 2024-01-25 VITALS — WEIGHT: 167 LBS | BODY MASS INDEX: 27.82 KG/M2 | HEIGHT: 65 IN

## 2024-01-25 DIAGNOSIS — M17.12 PRIMARY OSTEOARTHRITIS OF LEFT KNEE: ICD-10-CM

## 2024-01-25 DIAGNOSIS — M17.11 PRIMARY OSTEOARTHRITIS OF RIGHT KNEE: Primary | ICD-10-CM

## 2024-01-25 RX ORDER — TRIAMCINOLONE ACETONIDE 40 MG/ML
80 INJECTION, SUSPENSION INTRA-ARTICULAR; INTRAMUSCULAR
Status: COMPLETED | OUTPATIENT
Start: 2024-01-25 | End: 2024-01-25

## 2024-01-25 RX ORDER — BUPROPION HYDROCHLORIDE 300 MG/1
300 TABLET ORAL DAILY
COMMUNITY
Start: 2023-09-19

## 2024-01-25 RX ORDER — LIDOCAINE HYDROCHLORIDE 10 MG/ML
4 INJECTION, SOLUTION EPIDURAL; INFILTRATION; INTRACAUDAL; PERINEURAL
Status: COMPLETED | OUTPATIENT
Start: 2024-01-25 | End: 2024-01-25

## 2024-01-25 RX ADMIN — TRIAMCINOLONE ACETONIDE 80 MG: 40 INJECTION, SUSPENSION INTRA-ARTICULAR; INTRAMUSCULAR at 07:54

## 2024-01-25 RX ADMIN — LIDOCAINE HYDROCHLORIDE 4 ML: 10 INJECTION, SOLUTION EPIDURAL; INFILTRATION; INTRACAUDAL; PERINEURAL at 07:54

## 2024-01-25 NOTE — PROGRESS NOTES
Subjective:     Patient ID: Dianna Clay is a 44 y.o. female.    Chief Complaint:    History of Present Illness  Dianna Clay returns to clinic today for evaluation of bilateral knee pain.  The patient has seen me multiple times for bilateral knee injections for predominantly patellofemoral but still tricompartmental osteoarthritis.  She states her left knee is worse than her right.  She is training for many marathon.  She states the pain is quite severe when she has been running.  She is hopeful to get injections today and injections the day before the mini marathon.  She denies any new specific injury and has some questions about total knee arthroplasty in the future.     Social History     Occupational History    Occupation:    Tobacco Use    Smoking status: Former     Packs/day: 0.75     Years: 13.00     Additional pack years: 0.00     Total pack years: 9.75     Types: Cigarettes     Start date: 1998     Quit date: 2011     Years since quittin.7     Passive exposure: Never    Smokeless tobacco: Never    Tobacco comments:     Off and on, was not consecutive time smoker   Vaping Use    Vaping Use: Never used   Substance and Sexual Activity    Alcohol use: Yes     Alcohol/week: 2.0 standard drinks of alcohol     Types: 2 Glasses of wine per week     Comment: Do not drink much    Drug use: Not Currently     Comment: cocaine in late teens, she has done some cocaine 3 times in the last 6 wks    Sexual activity: Yes     Partners: Male     Birth control/protection: I.U.D.      Past Medical History:   Diagnosis Date    Anxiety     Arthritis     Arthritis of neck     Headache     Knee swelling      Past Surgical History:   Procedure Laterality Date    ABDOMINOPLASTY      BREAST AUGMENTATION      PATELLA REALIGNMENT         Family History   Problem Relation Age of Onset    Thyroid disease Mother     Colon polyps Father     Depression Father     Anxiety disorder  "Father     Alcohol abuse Father     Alcohol abuse Brother     Thyroid disease Maternal Grandmother     Heart failure Maternal Grandmother     No Known Problems Maternal Grandfather     Anxiety disorder Paternal Grandmother     Depression Paternal Grandmother     Alcohol abuse Paternal Grandmother     Dementia Paternal Grandmother     Anxiety disorder Paternal Grandfather     Depression Paternal Grandfather     Alcohol abuse Paternal Grandfather     Anxiety disorder Daughter     Depression Daughter     Bipolar disorder Daughter     Bipolar disorder Son     ADD / ADHD Son     Thyroid disease Maternal Aunt     Asthma Maternal Aunt     Alcohol abuse Paternal Uncle     Colon cancer Neg Hx     Crohn's disease Neg Hx     Ulcerative colitis Neg Hx     Irritable bowel syndrome Neg Hx                  Objective:  Vitals:    01/25/24 0750   Weight: 75.8 kg (167 lb)   Height: 165.1 cm (65\")         01/25/24  0750   Weight: 75.8 kg (167 lb)     Body mass index is 27.79 kg/m².        Right Knee Exam     Tenderness   The patient is experiencing tenderness in the patella, medial retinaculum, lateral joint line, lateral retinaculum and medial joint line.    Range of Motion   Extension:  0   Flexion:  130     Tests   Varus: negative Valgus: negative  Lachman:  Anterior - negative    Posterior - negative  Drawer:  Anterior - negative    Posterior - negative    Other   Erythema: absent  Scars: absent  Sensation: normal  Pulse: present  Swelling: none  Effusion: no effusion present      Left Knee Exam     Tenderness   The patient is experiencing tenderness in the lateral joint line, lateral retinaculum, medial joint line, medial retinaculum and patella.    Range of Motion   Extension:  0   Flexion:  130     Tests   Varus: negative Valgus: negative  Lachman:  Anterior - negative    Posterior - negative  Drawer:  Anterior - negative     Posterior - negative    Other   Erythema: absent  Scars: present  Sensation: normal  Pulse: " present  Swelling: mild  Effusion: no effusion present               Imaging: no new    Assessment:        1. Primary osteoarthritis of right knee    2. Primary osteoarthritis of left knee           Plan:      - Large Joint Arthrocentesis: bilateral knee on 1/25/2024 7:54 AM  Indications: pain  Details: 22 G needle, anterolateral approach  Medications (Right): 4 mL lidocaine PF 1% 1 %; 80 mg triamcinolone acetonide 40 MG/ML  Medications (Left): 4 mL lidocaine PF 1% 1 %; 80 mg triamcinolone acetonide 40 MG/ML  Outcome: tolerated well, no immediate complications  Procedure, treatment alternatives, risks and benefits explained, specific risks discussed. Consent was given by the patient. Immediately prior to procedure a time out was called to verify the correct patient, procedure, equipment, support staff and site/side marked as required. Patient was prepped and draped in the usual sterile fashion.         Discussed treatment options at length with patient at today's visit.  I discussed with the patient that she has developed severe patellofemoral and mild to moderate medial lateral joint osteoarthritis of bilateral knees.  I discussed with her that I would be happy to perform joint replacement surgery on her but I am not sure she would be happy with that considering her high activity level.  I recommend that she undergo injections today and again right before the mini marathon if she would like and we can discuss joint replacement surgery in the future.  She was in agreement with that plan.  I will plan to see her back on 4/26/2024 before the mini marathon  Follow up: 4/26/2024 with 4 views of bilateral knees for injections      Dianna Clay was in agreement with plan and had all questions answered.     Medications:  No orders of the defined types were placed in this encounter.      Followup:  No follow-ups on file.    Diagnoses and all orders for this visit:    1. Primary osteoarthritis of right knee  (Primary)    2. Primary osteoarthritis of left knee          Dictated utilizing Dragon dictation

## 2024-04-25 RX ORDER — TRAZODONE HYDROCHLORIDE 100 MG/1
150 TABLET ORAL
Qty: 135 TABLET | Refills: 0 | Status: SHIPPED | OUTPATIENT
Start: 2024-04-25

## 2024-04-26 DIAGNOSIS — K21.9 GASTROESOPHAGEAL REFLUX DISEASE WITHOUT ESOPHAGITIS: ICD-10-CM

## 2024-04-26 RX ORDER — PANTOPRAZOLE SODIUM 40 MG/1
40 TABLET, DELAYED RELEASE ORAL DAILY
Qty: 90 TABLET | Refills: 0 | Status: SHIPPED | OUTPATIENT
Start: 2024-04-26

## 2024-06-18 RX ORDER — TRAZODONE HYDROCHLORIDE 100 MG/1
150 TABLET ORAL
Qty: 135 TABLET | Refills: 0 | Status: SHIPPED | OUTPATIENT
Start: 2024-06-18

## 2024-06-18 RX ORDER — BUPROPION HYDROCHLORIDE 300 MG/1
300 TABLET ORAL DAILY
Qty: 90 TABLET | Refills: 0 | Status: SHIPPED | OUTPATIENT
Start: 2024-06-18

## 2024-08-15 DIAGNOSIS — Z00.00 HEALTHCARE MAINTENANCE: Primary | ICD-10-CM

## 2024-09-11 DIAGNOSIS — K21.9 GASTROESOPHAGEAL REFLUX DISEASE WITHOUT ESOPHAGITIS: ICD-10-CM

## 2024-09-11 RX ORDER — PANTOPRAZOLE SODIUM 40 MG/1
40 TABLET, DELAYED RELEASE ORAL DAILY
Qty: 90 TABLET | Refills: 0 | Status: SHIPPED | OUTPATIENT
Start: 2024-09-11

## 2024-10-10 ENCOUNTER — OFFICE VISIT (OUTPATIENT)
Dept: INTERNAL MEDICINE | Facility: CLINIC | Age: 44
End: 2024-10-10
Payer: COMMERCIAL

## 2024-10-10 VITALS
DIASTOLIC BLOOD PRESSURE: 78 MMHG | OXYGEN SATURATION: 99 % | HEART RATE: 103 BPM | WEIGHT: 183.5 LBS | BODY MASS INDEX: 30.57 KG/M2 | SYSTOLIC BLOOD PRESSURE: 110 MMHG | HEIGHT: 65 IN

## 2024-10-10 DIAGNOSIS — F31.32 BIPOLAR AFFECTIVE DISORDER, CURRENTLY DEPRESSED, MODERATE: Primary | ICD-10-CM

## 2024-10-10 DIAGNOSIS — F41.8 MIXED ANXIETY DEPRESSIVE DISORDER: ICD-10-CM

## 2024-10-10 DIAGNOSIS — F32.1 MAJOR DEPRESSIVE DISORDER, SINGLE EPISODE, MODERATE: ICD-10-CM

## 2024-10-10 PROCEDURE — 99214 OFFICE O/P EST MOD 30 MIN: CPT | Performed by: NURSE PRACTITIONER

## 2024-10-10 RX ORDER — BUPROPION HYDROCHLORIDE 300 MG/1
300 TABLET ORAL DAILY
Qty: 90 TABLET | Refills: 0 | Status: SHIPPED | OUTPATIENT
Start: 2024-10-10

## 2024-10-10 RX ORDER — DEXTROAMPHETAMINE SACCHARATE, AMPHETAMINE ASPARTATE, DEXTROAMPHETAMINE SULFATE AND AMPHETAMINE SULFATE 7.5; 7.5; 7.5; 7.5 MG/1; MG/1; MG/1; MG/1
30 TABLET ORAL DAILY
COMMUNITY
Start: 2024-09-13

## 2024-10-10 NOTE — PROGRESS NOTES
"Subjective   Dianna Clay is a 44 y.o. female.      History of Present Illness   The patient is here today with c/o depression and anxiety. She had been scheduled with [facility] but her flight got changed so she could not go. She had to stop her vraylar due to cost. Now with new job and insurance. Hard to get motivated. She really did well with vraylar. \"Wellbutrin is doing nothing for me.\"   She is doing better with grief over Dad.     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     She did break up with her boyfriend of 4 yrs 2 months ago.   The following portions of the patient's history were reviewed and updated as appropriate: allergies, current medications, past family history, past medical history, past social history, past surgical history and problem list.    Review of Systems   Constitutional: Negative.    Respiratory: Negative.     Cardiovascular:  Positive for chest pain (epigastric, does have acid reflux). Negative for palpitations and leg swelling.   Gastrointestinal:  Positive for GERD.   Psychiatric/Behavioral:  Positive for suicidal ideas (about a month ago, considered shooting herself, did give her gun away to her ex) and depressed mood. The patient is nervous/anxious.        Objective   Physical Exam  Constitutional:       Appearance: Normal appearance. She is well-developed.   Neck:      Thyroid: No thyromegaly.   Cardiovascular:      Rate and Rhythm: Normal rate and regular rhythm.      Heart sounds: Normal heart sounds.   Pulmonary:      Effort: Pulmonary effort is normal.      Breath sounds: Normal breath sounds.   Musculoskeletal:      Cervical back: Normal range of motion and neck supple.   Lymphadenopathy:      Cervical: No cervical adenopathy.   Skin:     General: Skin is warm and dry.   Neurological:      Mental Status: She is alert.   Psychiatric:         Behavior: Behavior normal.         Thought Content: Thought content normal.         Judgment: Judgment normal.         Vitals:    10/10/24 " 1039   BP: 110/78   Pulse: 103   SpO2: 99%     Body mass index is 30.54 kg/m².      Current Outpatient Medications:     amphetamine-dextroamphetamine (ADDERALL) 30 MG tablet, Take 1 tablet by mouth Daily., Disp: , Rfl:     buPROPion XL (WELLBUTRIN XL) 300 MG 24 hr tablet, Take 1 tablet by mouth once daily, Disp: 90 tablet, Rfl: 0    Cariprazine HCl (Vraylar) 1.5 MG capsule capsule, Take 1 capsule by mouth Daily., Disp: 30 capsule, Rfl: 0    cetirizine (zyrTEC) 10 MG tablet, Take 1 tablet by mouth Daily., Disp: , Rfl:     fluticasone (FLONASE) 50 MCG/ACT nasal spray, 2 sprays into the nostril(s) as directed by provider Daily., Disp: 9.9 mL, Rfl: 11    Gabriele Fe 1/20 1-20 MG-MCG per tablet, TAKE 1 TABLET BY MOUTH ONCE DAILY. TAKE CONTINUOUSLY, Disp: , Rfl:     naproxen (NAPROSYN) 500 MG tablet, Take 1 tablet by mouth 2 (Two) Times a Day With Meals., Disp: 180 tablet, Rfl: 3    ondansetron (Zofran) 4 MG tablet, TAKE ONE TABLET DAILY, Disp: 30 tablet, Rfl: 0    pantoprazole (PROTONIX) 40 MG EC tablet, Take 1 tablet by mouth once daily, Disp: 90 tablet, Rfl: 0    rizatriptan (Maxalt) 10 MG tablet, Take 1 tablet by mouth 1 (One) Time As Needed for Migraine. May repeat in 2 hours if needed, Disp: 12 tablet, Rfl: 2    traZODone (DESYREL) 100 MG tablet, TAKE 1.5 TABLETS BY MOUTH AT BEDTIME, Disp: 135 tablet, Rfl: 0   Lab on 05/08/2023   Component Date Value Ref Range Status    Glucose 05/11/2023 86  65 - 99 mg/dL Final    BUN 05/11/2023 14  6 - 20 mg/dL Final    Creatinine 05/11/2023 0.86  0.57 - 1.00 mg/dL Final    EGFR Result 05/11/2023 87.2  >60.0 mL/min/1.73 Final    Comment: GFR Normal >60  Chronic Kidney Disease <60  Kidney Failure <15      BUN/Creatinine Ratio 05/11/2023 16.3  7.0 - 25.0 Final    Sodium 05/11/2023 140  136 - 145 mmol/L Final    Potassium 05/11/2023 4.4  3.5 - 5.2 mmol/L Final    Chloride 05/11/2023 103  98 - 107 mmol/L Final    Total CO2 05/11/2023 26.2  22.0 - 29.0 mmol/L Final    Calcium 05/11/2023  9.5  8.6 - 10.5 mg/dL Final    Total Protein 05/11/2023 6.7  6.0 - 8.5 g/dL Final    Albumin 05/11/2023 4.3  3.5 - 5.2 g/dL Final    Globulin 05/11/2023 2.4  gm/dL Final    A/G Ratio 05/11/2023 1.8  g/dL Final    Total Bilirubin 05/11/2023 0.3  0.0 - 1.2 mg/dL Final    Alkaline Phosphatase 05/11/2023 45  39 - 117 U/L Final    AST (SGOT) 05/11/2023 19  1 - 32 U/L Final    ALT (SGPT) 05/11/2023 25  1 - 33 U/L Final    Total Cholesterol 05/11/2023 175  0 - 200 mg/dL Final    Comment: Cholesterol Reference Ranges  (U.S. Department of Health and Human Services ATP III  Classifications)  Desirable          <200 mg/dL  Borderline High    200-239 mg/dL  High Risk          >240 mg/dL  Triglyceride Reference Ranges  (U.S. Department of Health and Human Services ATP III  Classifications)  Normal           <150 mg/dL  Borderline High  150-199 mg/dL  High             200-499 mg/dL  Very High        >500 mg/dL  HDL Reference Ranges  (U.S. Department of Health and Human Services ATP III  Classifications)  Low     <40 mg/dl (major risk factor for CHD)  High    >60 mg/dl ('negative' risk factor for CHD)  LDL Reference Ranges  (U.S. Department of Health and Human Services ATP III  Classifications)  Optimal          <100 mg/dL  Near Optimal     100-129 mg/dL  Borderline High  130-159 mg/dL  High             160-189 mg/dL  Very High        >189 mg/dL      Triglycerides 05/11/2023 85  0 - 150 mg/dL Final    HDL Cholesterol 05/11/2023 82 (H)  40 - 60 mg/dL Final    VLDL Cholesterol Ronni 05/11/2023 15  5 - 40 mg/dL Final    LDL Chol Calc (NIH) 05/11/2023 78  0 - 100 mg/dL Final    Chol/HDL Ratio 05/11/2023 2.13   Final    TSH 05/11/2023 2.730  0.270 - 4.200 uIU/mL Final    WBC 05/11/2023 6.41  3.40 - 10.80 10*3/mm3 Final    RBC 05/11/2023 4.46  3.77 - 5.28 10*6/mm3 Final    Hemoglobin 05/11/2023 14.2  12.0 - 15.9 g/dL Final    Hematocrit 05/11/2023 42.0  34.0 - 46.6 % Final    MCV 05/11/2023 94.2  79.0 - 97.0 fL Final    MCH 05/11/2023  31.8  26.6 - 33.0 pg Final    MCHC 05/11/2023 33.8  31.5 - 35.7 g/dL Final    RDW 05/11/2023 11.4 (L)  12.3 - 15.4 % Final    Platelets 05/11/2023 197  140 - 450 10*3/mm3 Final    Neutrophil Rel % 05/11/2023 63.3  42.7 - 76.0 % Final    Lymphocyte Rel % 05/11/2023 29.3  19.6 - 45.3 % Final    Monocyte Rel % 05/11/2023 5.3  5.0 - 12.0 % Final    Eosinophil Rel % 05/11/2023 1.4  0.3 - 6.2 % Final    Basophil Rel % 05/11/2023 0.5  0.0 - 1.5 % Final    Neutrophils Absolute 05/11/2023 4.06  1.70 - 7.00 10*3/mm3 Final    Lymphocytes Absolute 05/11/2023 1.88  0.70 - 3.10 10*3/mm3 Final    Monocytes Absolute 05/11/2023 0.34  0.10 - 0.90 10*3/mm3 Final    Eosinophils Absolute 05/11/2023 0.09  0.00 - 0.40 10*3/mm3 Final    Basophils Absolute 05/11/2023 0.03  0.00 - 0.20 10*3/mm3 Final    Immature Granulocyte Rel % 05/11/2023 0.2  0.0 - 0.5 % Final    Immature Grans Absolute 05/11/2023 0.01  0.00 - 0.05 10*3/mm3 Final    nRBC 05/11/2023 0.0  0.0 - 0.2 /100 WBC Final      Assessment & Plan   Diagnoses and all orders for this visit:    1. Bipolar affective disorder, currently depressed, moderate (Primary)  -     Comprehensive Metabolic Panel  -     Cariprazine HCl (Vraylar) 1.5 MG capsule capsule; Take 1 capsule by mouth Daily.  Dispense: 30 capsule; Refill: 0    2. Mixed anxiety depressive disorder  -     Comprehensive Metabolic Panel  -     Cariprazine HCl (Vraylar) 1.5 MG capsule capsule; Take 1 capsule by mouth Daily.  Dispense: 30 capsule; Refill: 0    3. Major depressive disorder, single episode, moderate  -     Cariprazine HCl (Vraylar) 1.5 MG capsule capsule; Take 1 capsule by mouth Daily.  Dispense: 30 capsule; Refill: 0               1. Bipolar/Anxiety and depression- will Rx for vraylar, go back to therapy, discussed dosing, ok to increase to 3 mg after 1 day.   May d/c wellbutrin once feeling bad.

## 2024-10-11 LAB
ALBUMIN SERPL-MCNC: 4.5 G/DL (ref 3.5–5.2)
ALBUMIN/GLOB SERPL: 1.6 G/DL
ALP SERPL-CCNC: 55 U/L (ref 39–117)
ALT SERPL-CCNC: 24 U/L (ref 1–33)
AST SERPL-CCNC: 22 U/L (ref 1–32)
BILIRUB SERPL-MCNC: 0.4 MG/DL (ref 0–1.2)
BUN SERPL-MCNC: 17 MG/DL (ref 6–20)
BUN/CREAT SERPL: 21.3 (ref 7–25)
CALCIUM SERPL-MCNC: 9.6 MG/DL (ref 8.6–10.5)
CHLORIDE SERPL-SCNC: 100 MMOL/L (ref 98–107)
CO2 SERPL-SCNC: 24.1 MMOL/L (ref 22–29)
CREAT SERPL-MCNC: 0.8 MG/DL (ref 0.57–1)
EGFRCR SERPLBLD CKD-EPI 2021: 94.5 ML/MIN/1.73
GLOBULIN SER CALC-MCNC: 2.9 GM/DL
GLUCOSE SERPL-MCNC: 94 MG/DL (ref 65–99)
POTASSIUM SERPL-SCNC: 4.2 MMOL/L (ref 3.5–5.2)
PROT SERPL-MCNC: 7.4 G/DL (ref 6–8.5)
SODIUM SERPL-SCNC: 137 MMOL/L (ref 136–145)

## 2024-10-15 ENCOUNTER — TELEPHONE (OUTPATIENT)
Dept: INTERNAL MEDICINE | Facility: CLINIC | Age: 44
End: 2024-10-15

## 2024-10-15 NOTE — TELEPHONE ENCOUNTER
Caller: Dianna Clay    Relationship to patient: Self    Best call back number: [phone]     Patient is needing: PATIENT STATES THAT THE VRAYLAR WILL REQUIRE A PRIOR AUTHORIZATION.    SHE WOULD LIKE A CALLBACK TO LET HER KNOW HOW LONG THE PRIOR AUTHORIZATION GENERALLY TAKES TO GO THROUGH, AND TO ALSO LET HER KNOW IF THE OFFICE HAS SAMPLES OF THIS MEDICATION.  PLEASE ADVISE.

## 2024-10-16 NOTE — TELEPHONE ENCOUNTER
[facility]: [facility]. [place], [place] - [address] [facility] [phone] [facility] [phone]     Relationship: Pharmacy    Best call back number: [phone] OPTION 2    What was the call regarding: BRIANNA AGUIRRE  CALLED TO LET OFFICE KNOW THAT THE MEDICATION Cariprazine HCl (VRAYLAR) 3 MG capsule capsule HAS BEEN APPROVED FROM 10/15/24-10/15/2025    REF# [id number]

## 2024-12-12 ENCOUNTER — TELEPHONE (OUTPATIENT)
Dept: NEUROLOGY | Facility: CLINIC | Age: 44
End: 2024-12-12

## 2024-12-12 NOTE — TELEPHONE ENCOUNTER
MEDICATION CONCERNS    Caller: Dianna Clay    Relationship: Self    Best call back number: [phone]    Preferred pharmacy: [address] [phone] [facility] [phone] FX    What medications are you currently taking:   Current Outpatient Medications on File Prior to Visit   Medication Sig Dispense Refill    amphetamine-dextroamphetamine (ADDERALL) 30 MG tablet Take 1 tablet by mouth Daily.      buPROPion XL (WELLBUTRIN XL) 300 MG 24 hr tablet Take 1 tablet by mouth once daily 90 tablet 0    Cariprazine HCl (VRAYLAR) 3 MG capsule capsule Take 1 capsule by mouth Daily. 90 capsule 0    cetirizine (zyrTEC) 10 MG tablet Take 1 tablet by mouth Daily.      fluticasone (FLONASE) 50 MCG/ACT nasal spray 2 sprays into the nostril(s) as directed by provider Daily. 9.9 mL 11    Gabriele Fe 1/20 1-20 MG-MCG per tablet TAKE 1 TABLET BY MOUTH ONCE DAILY. TAKE CONTINUOUSLY      naproxen (NAPROSYN) 500 MG tablet Take 1 tablet by mouth 2 (Two) Times a Day With Meals. 180 tablet 3    ondansetron (Zofran) 4 MG tablet TAKE ONE TABLET DAILY 30 tablet 0    pantoprazole (PROTONIX) 40 MG EC tablet Take 1 tablet by mouth once daily 90 tablet 0    rizatriptan (Maxalt) 10 MG tablet Take 1 tablet by mouth 1 (One) Time As Needed for Migraine. May repeat in 2 hours if needed 12 tablet 2    traZODone (DESYREL) 100 MG tablet TAKE 1.5 TABLETS BY MOUTH AT BEDTIME 135 tablet 0     No current facility-administered medications on file prior to visit.     Which medication are you concerned about: rizatriptan (Maxalt) 10 MG tablet- Take 1 tablet by mouth 1 (One) Time As Needed for Migraine. May repeat in 2 hours if needed.    Who prescribed you this medication: DR. FRANCIS    When did you start taking these medications: 2021    What are your concerns: PT STATES THE RIZATRIPTAN MEDICATION IS NO LONGER HELPING TO ALLEVIATE HER MIGRAINES. SHE ASKS IF DR. FRANCIS COULD CALL IN DIFFERENT MIGRAINE  MEDICATION TO HELP WITH MIGRAINE MANAGEMENT UNTIL SHE IS ABLE TO GET BACK IN TO DISCUSS RESTARTING BOTOX INJECTIONS; F/U APPT SCHEDULED FOR 3/10/25.    PLEASE REVIEW AND ADVISE.

## 2024-12-17 ENCOUNTER — OFFICE VISIT (OUTPATIENT)
Dept: NEUROLOGY | Facility: CLINIC | Age: 44
End: 2024-12-17
Payer: COMMERCIAL

## 2024-12-17 ENCOUNTER — SPECIALTY PHARMACY (OUTPATIENT)
Dept: NEUROLOGY | Facility: CLINIC | Age: 44
End: 2024-12-17
Payer: COMMERCIAL

## 2024-12-17 VITALS
DIASTOLIC BLOOD PRESSURE: 78 MMHG | HEIGHT: 65 IN | HEART RATE: 72 BPM | OXYGEN SATURATION: 98 % | BODY MASS INDEX: 31.65 KG/M2 | SYSTOLIC BLOOD PRESSURE: 118 MMHG | WEIGHT: 190 LBS

## 2024-12-17 DIAGNOSIS — G43.709 CHRONIC MIGRAINE WITHOUT AURA WITHOUT STATUS MIGRAINOSUS, NOT INTRACTABLE: Primary | ICD-10-CM

## 2024-12-17 PROCEDURE — 99214 OFFICE O/P EST MOD 30 MIN: CPT | Performed by: PSYCHIATRY & NEUROLOGY

## 2024-12-17 RX ORDER — RIMEGEPANT SULFATE 75 MG/75MG
75 TABLET, ORALLY DISINTEGRATING ORAL EVERY OTHER DAY
Qty: 16 TABLET | Refills: 2 | Status: SHIPPED | OUTPATIENT
Start: 2024-12-17

## 2024-12-17 NOTE — PROGRESS NOTES
Chief Complaint  Migraine (About 8 migraines a month, they last for about 24 hours to a few days - wants to discuss restarting Botox)    Subjective          Dianna Clay presents to [facility] for   HISTORY OF PRESENT ILLNESS:    Dianna Clay is a 44 year old right handed woman for follow up evaluation and treatment of chronic migraines.  She reports her migraines starting about 15+ years ago.  Her migraines are located in the back of her head and also front of her head with throbbing quality which she rates as 8-10/10 on pain scale 1-10 with associated light and sound sensitivity and nausea and vomiting at times.  Headaches typically last a couple days and she feels like she has had more headaches over the past 6-7+ months.  She has tried topiramate 200mg daily which initially helped but had not helped more recently and due to having history of kidney stones this was discontinued.  She was previously on Prozac which has been discontinued and she continues to take bupropion along with Vraylar currently.  She has tried Imitrex and Fioricet which she did not think was helpful.  Maxalt seemed to be helping better but not helping any longer.  She denies family history of migraines.  She had a brain MRI scan done on 4/14/2021 which looks good with some nonspecific white matter changes which can be seen with migraines.  I started her on Emgality and she  had 5 doses and did not noticed any improvement in her migraines.  She continued to get 8-10 migraine days per month currently and up to prior 15+ migraine days per month in the past which responded well to Botox injections which had to unfortunately be discontinued due to her losing insurance but the Botox injections for migraines reduced her migraines down to maybe 1-2 migraines per month with her most recent prior Botox injection which is more than 80% drop in frequency of her migraines.  She returns today and would like further  assistance with her migraines since she has been off of Botox for over a year due to loss of insurance.       Past Medical History:   Diagnosis Date    Anxiety     Arthritis     Arthritis of neck     Headache     Knee swelling         Family History   Problem Relation Age of Onset    Thyroid disease Mother     Colon polyps Father     Depression Father     Anxiety disorder Father     Alcohol abuse Father     Alcohol abuse Brother     Thyroid disease Maternal Grandmother     Heart failure Maternal Grandmother     No Known Problems Maternal Grandfather     Anxiety disorder Paternal Grandmother     Depression Paternal Grandmother     Alcohol abuse Paternal Grandmother     Dementia Paternal Grandmother     Anxiety disorder Paternal Grandfather     Depression Paternal Grandfather     Alcohol abuse Paternal Grandfather     Anxiety disorder Daughter     Depression Daughter     Bipolar disorder Daughter     Bipolar disorder Son     ADD / ADHD Son     Thyroid disease Maternal Aunt     Asthma Maternal Aunt     Alcohol abuse Paternal Uncle     Colon cancer Neg Hx     Crohn's disease Neg Hx     Ulcerative colitis Neg Hx     Irritable bowel syndrome Neg Hx         Social History     Socioeconomic History    Marital status: Single    Number of children: 2   Tobacco Use    Smoking status: Former     Current packs/day: 0.00     Average packs/day: 0.8 packs/day for 13.0 years (9.7 ttl pk-yrs)     Types: Cigarettes     Start date: 1998     Quit date: 2011     Years since quittin.6     Passive exposure: Never    Smokeless tobacco: Never    Tobacco comments:     Off and on, was not consecutive time smoker   Vaping Use    Vaping status: Never Used   Substance and Sexual Activity    Alcohol use: Yes     Alcohol/week: 2.0 standard drinks of alcohol     Types: 2 Glasses of wine per week     Comment: Do not drink much    Drug use: Not Currently     Comment: cocaine in late teens, she has done some cocaine 3 times in the  "last 6 wks    Sexual activity: Yes     Partners: Male     Birth control/protection: I.U.D.        I have reviewed and confirmed the accuracy of the ROS as documented by the MA/LPN/RN Sheyla Mackenzie MD   Review of Systems   Neurological:  Positive for headache. Negative for dizziness, tremors, seizures, syncope, facial asymmetry, speech difficulty, weakness, light-headedness, numbness, memory problem and confusion.   Psychiatric/Behavioral:  Negative for agitation, behavioral problems, decreased concentration, dysphoric mood, hallucinations, self-injury, sleep disturbance, suicidal ideas, negative for hyperactivity, depressed mood and stress. The patient is not nervous/anxious.         Objective   Vital Signs:   /78   Pulse 72   Ht 165.1 cm (65\")   Wt 86.2 kg (190 lb)   SpO2 98%   BMI 31.62 kg/m²       PHYSICAL EXAM:    General   Mental Status - Alert. General Appearance - Well developed, Well groomed, Oriented and Cooperative. Orientation - Oriented X3.       Head and Neck  Head - normocephalic, atraumatic with no lesions or palpable masses.  Neck    Global Assessment - supple.       Eye   Sclera/Conjunctiva - Bilateral - Normal.    ENMT  Mouth and Throat   Oral Cavity/Oropharynx: Oropharynx - the soft palate,uvula and tongue are normal in appearance.    Chest and Lung Exam   Chest - lung clear to auscultation bilaterally.    Cardiovascular   Cardiovascular examination reveals  - normal heart sounds, regular rate and rhythm.    Neurologic   Mental Status: Speech - Normal. Cognitive function - appropriate fund of knowledge. No impairment of attention, Impairment of concentration, impairment of long term memory or impairment of short term memory.  Cranial Nerves:   II Optic: Visual acuity - Left - Normal. Right - Normal. Visual fields - Normal (to confrontation).  III Oculomotor: Pupillary constriction - Left - Normal. Right - Normal.  VII Facial: - Normal Bilaterally.   IX Glossopharyngeal / X Vagus - " Normal.  XI Accessory: Trapezius - Bilateral - Normal. Sternocleidomastoid - Bilateral - Normal.  XII Hypoglossal - Bilateral - Normal.  Eye Movements: - Normal Bilaterally.  Sensory:   Light Touch: Intact - Globally.  Motor:   Bulk and Contour: - Normal.  Tone: - Normal.  Tremor: Not present.  Strength: 5/5 normal muscle strength - All Muscles.   General Assessment of Reflexes: - deep tendon reflexes are normal. Coordination - No Impairment of finger-to-nose or Impairment of rapid alternating movements. Gait - Normal.       Result Review :                 Assessment and Plan    Problem List Items Addressed This Visit       Chronic migraine without aura without status migrainosus, not intractable - Primary    Current Assessment & Plan     44 year old right handed woman with chronic migraines.  She reports her migraines starting about 15+ years ago.  Her migraines are located in the back of her head and also front of her head with throbbing quality which she rates as 8-10/10 on pain scale 1-10 with associated light and sound sensitivity and nausea and vomiting at times.  Headaches typically last a couple days and she feels like she has had more headaches over the past 6-7+ months.  She has tried topiramate 200mg daily which initially helped but had not helped more recently and due to having history of kidney stones this was discontinued.  She was previously on Prozac which has been discontinued and she continues to take bupropion along with Vraylar currently.  She has tried Imitrex and Fioricet which she did not think was helpful.  Maxalt seemed to be helping better but not helping any longer.  She denies family history of migraines.  She had a brain MRI scan done on 4/14/2021 which looks good with some nonspecific white matter changes which can be seen with migraines.  I started her on Emgality and she  had 5 doses and did not noticed any improvement in her migraines.  She continued to get 8-10 migraine days per  month currently and up to prior 15+ migraine days per month in the past which responded well to Botox injections which had to unfortunately be discontinued due to her losing insurance but the Botox injections for migraines reduced her migraines down to maybe 1-2 migraines per month with her most recent prior Botox injection which is more than 80% drop in frequency of her migraines.  She returns today and would like further assistance with her migraines since she has been off of Botox for over a year due to loss of insurance.   I will try to get her set up for Botox again as this was very helpful.  In the meantime I will start her on Nurtec ODT for prevention and acute treatment to see if this is helpful more acutely.  Discussed migraine triggers and lifestyle modifications.           Relevant Medications    Rimegepant Sulfate (Nurtec) 75 MG tablet dispersible tablet       I spent 32 minutes caring for Dianna on this date of service. This time includes time spent by me in the following activities:preparing for the visit, reviewing tests, obtaining and/or reviewing a separately obtained history, performing a medically appropriate examination and/or evaluation , counseling and educating the patient/family/caregiver, ordering medications, tests, or procedures, documenting information in the medical record, and care coordination    Follow Up   Return in about 3 months (around 3/17/2025).  Patient was given instructions and counseling regarding her condition or for health maintenance advice. Please see specific information pulled into the AVS if appropriate.

## 2024-12-17 NOTE — ASSESSMENT & PLAN NOTE
44 year old right handed woman with chronic migraines.  She reports her migraines starting about 15+ years ago.  Her migraines are located in the back of her head and also front of her head with throbbing quality which she rates as 8-10/10 on pain scale 1-10 with associated light and sound sensitivity and nausea and vomiting at times.  Headaches typically last a couple days and she feels like she has had more headaches over the past 6-7+ months.  She has tried topiramate 200mg daily which initially helped but had not helped more recently and due to having history of kidney stones this was discontinued.  She was previously on Prozac which has been discontinued and she continues to take bupropion along with Vraylar currently.  She has tried Imitrex and Fioricet which she did not think was helpful.  Maxalt seemed to be helping better but not helping any longer.  She denies family history of migraines.  She had a brain MRI scan done on 4/14/2021 which looks good with some nonspecific white matter changes which can be seen with migraines.  I started her on Emgality and she  had 5 doses and did not noticed any improvement in her migraines.  She continued to get 8-10 migraine days per month currently and up to prior 15+ migraine days per month in the past which responded well to Botox injections which had to unfortunately be discontinued due to her losing insurance but the Botox injections for migraines reduced her migraines down to maybe 1-2 migraines per month with her most recent prior Botox injection which is more than 80% drop in frequency of her migraines.  She returns today and would like further assistance with her migraines since she has been off of Botox for over a year due to loss of insurance.   I will try to get her set up for Botox again as this was very helpful.  In the meantime I will start her on Nurtec ODT for prevention and acute treatment to see if this is helpful more acutely.  Discussed migraine  triggers and lifestyle modifications.

## 2024-12-17 NOTE — PROGRESS NOTES
Specialty Pharmacy Patient Management Program  Neurology Initial Assessment     Dianna Clay is a 44 y.o. female with chronic migraine seen by a Neurology provider and enrolled in the Neurology Patient Management program offered by [facility].  An initial outreach was conducted, including assessment of therapy appropriateness and specialty medication education for rimegepant (Nurtec). The patient was introduced to services offered by [facility], including: regular assessments, refill coordination, curbside pick-up or mail order delivery options, prior authorization maintenance, and financial assistance programs as applicable. The patient was also provided with contact information for the pharmacy team.     Insurance Coverage & Financial Support  Rx [employer] / [employer] of [place] and copay card.    Relevant Past Medical History and Comorbidities  Relevant medical history and concomitant health conditions were discussed with the patient. The patient's chart has been reviewed for relevant past medical history and comorbid health conditions and updated as necessary.   Past Medical History:   Diagnosis Date    Anxiety     Arthritis     Arthritis of neck     Headache     Knee swelling      Social History     Socioeconomic History    Marital status: Single    Number of children: 2   Tobacco Use    Smoking status: Former     Current packs/day: 0.00     Average packs/day: 0.8 packs/day for 13.0 years (9.7 ttl pk-yrs)     Types: Cigarettes     Start date: 1998     Quit date: 2011     Years since quittin.6     Passive exposure: Never    Smokeless tobacco: Never    Tobacco comments:     Off and on, was not consecutive time smoker   Vaping Use    Vaping status: Never Used   Substance and Sexual Activity    Alcohol use: Yes     Alcohol/week: 2.0 standard drinks of alcohol     Types: 2 Glasses of wine per week     Comment: Do not drink much    Drug  use: Not Currently     Comment: cocaine in late teens, she has done some cocaine 3 times in the last 6 wks    Sexual activity: Yes     Partners: Male     Birth control/protection: I.U.D.     Problem list reviewed by Cory Guzman RPH on 12/17/2024 at  3:44 PM      Allergies  Known allergies and reactions were discussed with the patient. The patient's chart has been reviewed for  allergy information and updated as necessary.   Allergies   Allergen Reactions    Meperidine      Allergies reviewed by Cory Guzman RPH on 12/17/2024 at  3:44 PM      Relevant Laboratory Values  Common labs          10/10/2024    11:30   Common Labs   Glucose 94    BUN 17    Creatinine 0.80    Sodium 137    Potassium 4.2    Chloride 100    Calcium 9.6    Total Protein 7.4    Albumin 4.5    Total Bilirubin 0.4    Alkaline Phosphatase 55    AST (SGOT) 22    ALT (SGPT) 24        Lab Assessment  The above labs have been reviewed. No dose adjustments are needed for the specialty medication(s) based on the labs.       Current Medication List  This medication list has been reviewed with the patient and evaluated for any interactions or necessary modifications/recommendations, and updated to include all prescription medications, OTC medications, and supplements the patient is currently taking.  This list reflects what is contained in the patient's profile, which has also been marked as reviewed to communicate to other providers it is the most up to date version of the patient's current medication therapy.     Current Outpatient Medications:     amphetamine-dextroamphetamine (ADDERALL) 30 MG tablet, Take 1 tablet by mouth Daily., Disp: , Rfl:     buPROPion XL (WELLBUTRIN XL) 300 MG 24 hr tablet, Take 1 tablet by mouth once daily, Disp: 90 tablet, Rfl: 0    Cariprazine HCl (VRAYLAR) 3 MG capsule capsule, Take 1 capsule by mouth Daily., Disp: 90 capsule, Rfl: 0    cetirizine (zyrTEC) 10 MG tablet, Take 1 tablet by mouth Daily., Disp: , Rfl:      fluticasone (FLONASE) 50 MCG/ACT nasal spray, 2 sprays into the nostril(s) as directed by provider Daily., Disp: 9.9 mL, Rfl: 11    Gabriele Fe 1/20 1-20 MG-MCG per tablet, TAKE 1 TABLET BY MOUTH ONCE DAILY. TAKE CONTINUOUSLY, Disp: , Rfl:     naproxen (NAPROSYN) 500 MG tablet, Take 1 tablet by mouth 2 (Two) Times a Day With Meals., Disp: 180 tablet, Rfl: 3    ondansetron (Zofran) 4 MG tablet, TAKE ONE TABLET DAILY, Disp: 30 tablet, Rfl: 0    pantoprazole (PROTONIX) 40 MG EC tablet, Take 1 tablet by mouth once daily, Disp: 90 tablet, Rfl: 0    Rimegepant Sulfate (Nurtec) 75 MG tablet dispersible tablet, Take 1 tablet by mouth Every Other Day., Disp: 16 tablet, Rfl: 2    rizatriptan (Maxalt) 10 MG tablet, Take 1 tablet by mouth 1 (One) Time As Needed for Migraine. May repeat in 2 hours if needed, Disp: 12 tablet, Rfl: 2    traZODone (DESYREL) 100 MG tablet, TAKE 1.5 TABLETS BY MOUTH AT BEDTIME, Disp: 135 tablet, Rfl: 0    Medicines reviewed by Cory Guzman [facility] on 12/17/2024 at  3:44 PM    Drug Interactions  None identified.       Initial Education Provided for Specialty Medication  The patient has been provided with the following education and any applicable administration techniques (i.e. self-injection) have been demonstrated for the therapies indicated. All questions and concerns have been addressed prior to the patient receiving the medication, and the patient has verbalized understanding of the education and any materials provided.  Additional patient education shall be provided and documented upon request by the patient, provider or payer.      Nurtec (rimegepant) 75 mg ODT, 1 tablet by mouth every other day  Medication Expectations   Why am I taking this medication? You are taking this medication for migraine prophylaxis.   What should I expect while on this medication? You should expect to see a decrease in the frequency and severity of your migraines.   How does the medication work? Nurtec is a small  molecule that binds to calcitonin gene-related peptide (CGRP) and blocks its binding to the receptor decreasing the severity of migraines.   How long will I be on this medication for? The amount of time you will be on this medication will be determined by your doctor and your response to the medication.    How do I take this medication? Take as directed on your prescription label.   What are some possible side effects? Potential side effects including, but not limited to nausea. Patient verbalized understanding.   What happens if I miss a dose? Take the missed dose as soon as possible, and resume the every other day timed from the last dose.     Medication Safety   What are things I should warn my doctor immediately about? Hypersensitivity reactions - trouble breathing or swallowing.   What are things that I should be cautious of? Hypersensitivity reactions (eg, dyspnea, rash), including delayed serious reactions, have occurred; discontinue use if suspected    What are some medications that can interact with this one? Avoid concomitant administration of Nurtec ODT with strong inhibitors of CY, strong or moderate inducers of CYP3A or inhibitors of P-gp or BCRP. Avoid another dose of Nurtec ODT within 48 hours when it is administered with moderate inhibitors of CY. Ask your pharmacist or health care provider before starting new medications     Medication Storage/Handling   How should I handle this medication? Keep this medication out of reach of pets/children in original container. Ensure hands are dry before opening blister pack.   How does this medication need to be stored? Store at room temperature away from heat/cold, sunlight or moisture   How should I dispose of this medication? There should not be a need to dispose of this medication unless your provider decides to change the dose or therapy. If that is the case, take to your local police station for proper disposal. Some pharmacies also have take-back  bins for medication drop-off.      Resources/Support   How can I remind myself to take this medication? You can download reminder apps to help you manage your refills. You may also set an alarm on your phone to remind you. The pharmacy carries pill boxes that you can place next to an area you pass everyday (such as where you place your car keys or where you charge your phone)   Is financial support available?  Yes, [employer] can provide co-pay cards if you have commercial insurance or patient assistance if you have Medicare or no insurance.    Which vaccines are recommended for me? Talk to your doctor about these vaccines: Flu, Coronavirus (COVID-19), Pneumococcal (pneumonia), Tdap, Hepatitis B, Zoster (shingles)           Adherence and Self-Administration  Adherence related to the patient's specialty therapy was discussed with the patient. The Adherence segment of this outreach has been reviewed and updated.   Is there a concern with patient's ability to self administer the medication correctly and without issue?: No  Were any potential barriers to adherence identified during the initial assessment or patient education?: No  Are there any concerns regarding the patient's understanding of the importance of medication adherence?: No  Methods for Supporting Patient Adherence and/or Self-Administration: no further support needed at this time.       Goals of Therapy  Goals related to the patient's specialty therapy were discussed with the patient. The Patient Goals segment of this outreach has been reviewed and updated.   Goals Addressed Today        Specialty Pharmacy General Goal      Reduce migraine frequency and severity. As of 12/17/24 patient rates migraines as a 8-10/10 on a pain scale from 1-10 with associated light and sound sensitivity and nausea and vomiting at times. As of 12/17/24, she reports about 8-10 migraine days per month and up to 15+ migraine days per month in the past. Botox had  previously reduced her migraines down to maybe 1-2 migraines per month with her most recent prior Botox injection which is more than 80% drop in frequency of her migraines. Restarting Botox for prevention and starting rimegepant for either prevention or acute treatment use pending known unavoidable triggers and pending migraine frequency.                  Reassessment Plan & Follow-Up  Medication Therapy Changes: Restarting Botox and starting rimegepant for either prevention or acute treatment use pending known unavoidable triggers and pending migraine frequency per patient's neurologist.  Related Plans, Therapy Recommendations, or Therapy Problems to Be Addressed: Nothing further to be addressed at this time.   Pharmacist to perform regular reassessments no more than (6) months from the previous assessment.  Care Coordinator to set up future refill outreaches, coordinate prescription delivery, and escalate clinical questions to pharmacist.   Welcome information and patient satisfaction survey to be sent by specialty pharmacy team with patient's initial fill.    Attestation  Therapeutic appropriateness: Appropriate   I attest the patient was actively involved in and has agreed to the above plan of care. If the prescribed therapy is at any point deemed not appropriate based on the current or future assessments, a consultation will be initiated with the patient's specialty care provider to determine the best course of action. The revised plan of therapy will be documented along with any additional patient education provided. Discussed aforementioned material with patient by phone.    Cory Guzman, PharmD, [facility]  Clinic Specialty Pharmacist, Neurology  12/17/2024  15:48 EST

## 2025-01-03 ENCOUNTER — OFFICE VISIT (OUTPATIENT)
Dept: ORTHOPEDIC SURGERY | Facility: CLINIC | Age: 45
End: 2025-01-03
Payer: COMMERCIAL

## 2025-01-03 VITALS — BODY MASS INDEX: 31.65 KG/M2 | WEIGHT: 190 LBS | HEIGHT: 65 IN

## 2025-01-03 DIAGNOSIS — M17.12 PRIMARY OSTEOARTHRITIS OF LEFT KNEE: ICD-10-CM

## 2025-01-03 DIAGNOSIS — M17.11 PRIMARY OSTEOARTHRITIS OF RIGHT KNEE: Primary | ICD-10-CM

## 2025-01-03 RX ORDER — MELOXICAM 15 MG/1
15 TABLET ORAL DAILY
Qty: 30 TABLET | Refills: 3 | Status: SHIPPED | OUTPATIENT
Start: 2025-01-03

## 2025-01-03 RX ORDER — LIDOCAINE HYDROCHLORIDE 10 MG/ML
4 INJECTION, SOLUTION EPIDURAL; INFILTRATION; INTRACAUDAL; PERINEURAL
Status: COMPLETED | OUTPATIENT
Start: 2025-01-03 | End: 2025-01-03

## 2025-01-03 RX ORDER — TRIAMCINOLONE ACETONIDE 40 MG/ML
80 INJECTION, SUSPENSION INTRA-ARTICULAR; INTRAMUSCULAR
Status: COMPLETED | OUTPATIENT
Start: 2025-01-03 | End: 2025-01-03

## 2025-01-03 RX ADMIN — LIDOCAINE HYDROCHLORIDE 4 ML: 10 INJECTION, SOLUTION EPIDURAL; INFILTRATION; INTRACAUDAL; PERINEURAL at 15:00

## 2025-01-03 RX ADMIN — TRIAMCINOLONE ACETONIDE 80 MG: 40 INJECTION, SUSPENSION INTRA-ARTICULAR; INTRAMUSCULAR at 15:00

## 2025-01-03 NOTE — PROGRESS NOTES
Subjective:     Patient ID: Dianna Clya is a 44 y.o. female.    Chief Complaint:    History of Present Illness  Dianna Clay returns to clinic today for evaluation of bilateral knee pain.  The patient states that the knee pain has gotten worse and she has also gained some weight which she thinks may be contributing to it.  She is no longer trying to run.  She was last seen by me in January and underwent bilateral knee injections.  She is over again another round of injections today and to check the progression of her arthritis.     Social History     Occupational History    Occupation:    Tobacco Use    Smoking status: Former     Current packs/day: 0.00     Average packs/day: 0.8 packs/day for 13.0 years (9.7 ttl pk-yrs)     Types: Cigarettes     Start date: 1998     Quit date: 2011     Years since quittin.6     Passive exposure: Never    Smokeless tobacco: Never    Tobacco comments:     Off and on, was not consecutive time smoker   Vaping Use    Vaping status: Never Used   Substance and Sexual Activity    Alcohol use: Yes     Alcohol/week: 2.0 standard drinks of alcohol     Types: 2 Glasses of wine per week     Comment: Do not drink much    Drug use: Not Currently     Comment: cocaine in late teens, she has done some cocaine 3 times in the last 6 wks    Sexual activity: Yes     Partners: Male     Birth control/protection: I.U.D.      Past Medical History:   Diagnosis Date    Anxiety     Arthritis     Arthritis of neck     Headache     Knee swelling      Past Surgical History:   Procedure Laterality Date    ABDOMINOPLASTY      BREAST AUGMENTATION      PATELLA REALIGNMENT         Family History   Problem Relation Age of Onset    Thyroid disease Mother     Colon polyps Father     Depression Father     Anxiety disorder Father     Alcohol abuse Father     Alcohol abuse Brother     Thyroid disease Maternal Grandmother     Heart failure Maternal Grandmother      "No Known Problems Maternal Grandfather     Anxiety disorder Paternal Grandmother     Depression Paternal Grandmother     Alcohol abuse Paternal Grandmother     Dementia Paternal Grandmother     Anxiety disorder Paternal Grandfather     Depression Paternal Grandfather     Alcohol abuse Paternal Grandfather     Anxiety disorder Daughter     Depression Daughter     Bipolar disorder Daughter     Bipolar disorder Son     ADD / ADHD Son     Thyroid disease Maternal Aunt     Asthma Maternal Aunt     Alcohol abuse Paternal Uncle     Colon cancer Neg Hx     Crohn's disease Neg Hx     Ulcerative colitis Neg Hx     Irritable bowel syndrome Neg Hx                  Objective:  Vitals:    01/03/25 1435   Weight: 86.2 kg (190 lb)   Height: 165.1 cm (65\")         01/03/25  1435   Weight: 86.2 kg (190 lb)     Body mass index is 31.62 kg/m².  BMI is >= 30 and <35. (Class 1 Obesity). The following options were offered after discussion;: referral to primary care        Right Knee Exam     Tenderness   The patient is experiencing tenderness in the medial retinaculum, medial joint line, lateral retinaculum, lateral joint line and patella.    Range of Motion   Extension:  0   Flexion:  120     Tests   Varus: negative Valgus: negative  Lachman:  Anterior - negative    Posterior - negative  Drawer:  Anterior - negative    Posterior - negative    Other   Erythema: absent  Scars: absent  Sensation: normal  Pulse: present  Swelling: mild  Effusion: no effusion present      Left Knee Exam     Tenderness   The patient is experiencing tenderness in the lateral joint line, lateral retinaculum, patella, medial retinaculum and medial joint line.    Range of Motion   Extension:  0   Flexion:  120     Tests   Varus: negative Valgus: negative  Lachman:  Anterior - negative    Posterior - negative  Drawer:  Anterior - negative     Posterior - negative    Other   Erythema: absent  Scars: present  Sensation: normal  Pulse: present  Swelling: " mild  Effusion: no effusion present               Imagin views of the bilateral knee were ordered and reviewed by myself in the office today  Indication: bilateral knee pain  Findings: X-rays demonstrate no acute osseous abnormality.  There is no signs of fracture dislocation or subluxation.  There is joint space narrowing, subchondral sclerosis, cystic changes, and periarticular osteophytes most pronounced in the patellofemoral joint.  Comparative studies: None      Assessment:        1. Primary osteoarthritis of right knee    2. Primary osteoarthritis of left knee           Plan:  - Large Joint Arthrocentesis: bilateral knee on 1/3/2025 3:00 PM  Indications: pain  Details: 22 G needle, anterolateral approach  Medications (Right): 4 mL lidocaine PF 1% 1 %; 80 mg triamcinolone acetonide 40 MG/ML  Medications (Left): 4 mL lidocaine PF 1% 1 %; 80 mg triamcinolone acetonide 40 MG/ML  Outcome: tolerated well, no immediate complications  Procedure, treatment alternatives, risks and benefits explained, specific risks discussed. Consent was given by the patient. Immediately prior to procedure a time out was called to verify the correct patient, procedure, equipment, support staff and site/side marked as required. Patient was prepped and draped in the usual sterile fashion.                 Discussed treatment options at length with patient at today's visit. I discussed with the patient that the mainstays of conservative treatment for knee OA include physical therapy, nonsteroidal anti-inflammatories, injections, activity modification, and home exercises.  The patient states that they  would like to try another bilateral knee corticosteroid injection.  At this point time the patient does not need to schedule follow-up with me today.  I am happy to see the patient back at any point time however if issues arise or they fail to make a full recovery.  Follow up: As needed      Dianna Clay was in agreement with plan and  had all questions answered.     Medications:  New Medications Ordered This Visit   Medications    meloxicam (MOBIC) 15 MG tablet     Sig: Take 1 tablet by mouth Daily.     Dispense:  30 tablet     Refill:  3       Followup:  No follow-ups on file.    Diagnoses and all orders for this visit:    1. Primary osteoarthritis of right knee (Primary)  -     XR Knee 4+ View Bilateral  -     - Large Joint Arthrocentesis: bilateral knee    2. Primary osteoarthritis of left knee  -     XR Knee 4+ View Bilateral  -     - Large Joint Arthrocentesis: bilateral knee    Other orders  -     meloxicam (MOBIC) 15 MG tablet; Take 1 tablet by mouth Daily.  Dispense: 30 tablet; Refill: 3          Dictated utilizing Dragon dictation

## 2025-01-06 RX ORDER — BUPROPION HYDROCHLORIDE 300 MG/1
300 TABLET ORAL DAILY
Qty: 90 TABLET | Refills: 0 | Status: SHIPPED | OUTPATIENT
Start: 2025-01-06

## 2025-01-14 RX ORDER — CARIPRAZINE 3 MG/1
3 CAPSULE, GELATIN COATED ORAL DAILY
Qty: 90 CAPSULE | Refills: 0 | Status: SHIPPED | OUTPATIENT
Start: 2025-01-14

## 2025-01-15 ENCOUNTER — PROCEDURE VISIT (OUTPATIENT)
Dept: NEUROLOGY | Facility: CLINIC | Age: 45
End: 2025-01-15
Payer: COMMERCIAL

## 2025-01-15 ENCOUNTER — SPECIALTY PHARMACY (OUTPATIENT)
Dept: NEUROLOGY | Facility: CLINIC | Age: 45
End: 2025-01-15
Payer: COMMERCIAL

## 2025-01-15 DIAGNOSIS — G43.709 CHRONIC MIGRAINE WITHOUT AURA WITHOUT STATUS MIGRAINOSUS, NOT INTRACTABLE: Primary | ICD-10-CM

## 2025-01-15 NOTE — PROGRESS NOTES
A time out was performed to confirm I had the right patient and was completing the right procedure.       Botox injection: Botox injection for neuromuscular block.  Indication: Chronic migraines.  Risks, benefits and alternatives were discussed with the patient.  EMG guidance was not used in identifying the injection site.  Procerus: 5 units was injected.  : 5 units was injected on the right and 5 units injected on the left.  Frontalis: 10 units injected on the right and 10 units injected on the left.  Temporalis: 20 units injected on the right and 20 units injected on the left.  Occipitalis: 15 units injected on the right and 15 units injected on the left.  Cervical paraspinal: 10 units injected on the right and 10 units injected on the left.  Trapezius: 15 units injected on the right and 15 units injected on the left.  200 unit vial, 1 vials, 45 wasted and 155 used.  The patient tolerated the procedure well.  There were no complications.  Patient instructions: The patient was instructed that they may experience localized discomfort over the next 12 to 24 hours and may take over the counter pain medication if needed.  Follow-up in the office in 3 months for next Botox injection.      Botox provided by: office provided, buy and bill

## 2025-01-15 NOTE — PROGRESS NOTES
Specialty Pharmacy Refill Coordination Note     Dianna is a 45 y.o. female contacted today regarding refills of Nurtec specialty medication(s).    Reviewed and verified with patient:       Specialty medication(s) and dose(s) confirmed: yes    Refill Questions      Flowsheet Row Most Recent Value   Changes to allergies? No   Changes to medications? No   New conditions or infections since last clinic visit No   Unplanned office visit, urgent care, ED, or hospital admission in the last 4 weeks  No   How does patient/caregiver feel medication is working? Very good   Financial problems or insurance changes  No   Since the previous refill, were any specialty medication doses or scheduled injections missed or delayed?  No   Does this patient require a clinical escalation to a pharmacist? No            Delivery Questions      Flowsheet Row Most Recent Value   Delivery method [employer]   Delivery address verified with patient/caregiver? Yes   Delivery address Home   Number of medications in delivery 1   Medication(s) being filled and delivered Rimegepant Sulfate (Nurtec)   Doses left of specialty medications 5   Copay verified? Yes   Copay amount $0   Copay form of payment No copayment ($0)   Ship Date 1/16   Delivery Date 1/17   Signature Required No                   Follow-up: 21 day(s)     Odilia Mora, Pharmacy Technician  Specialty Pharmacy Technician

## 2025-02-12 DIAGNOSIS — K21.9 GASTROESOPHAGEAL REFLUX DISEASE WITHOUT ESOPHAGITIS: ICD-10-CM

## 2025-02-12 RX ORDER — PANTOPRAZOLE SODIUM 40 MG/1
40 TABLET, DELAYED RELEASE ORAL DAILY
Qty: 90 TABLET | Refills: 0 | Status: SHIPPED | OUTPATIENT
Start: 2025-02-12

## 2025-02-13 ENCOUNTER — SPECIALTY PHARMACY (OUTPATIENT)
Dept: NEUROLOGY | Facility: CLINIC | Age: 45
End: 2025-02-13
Payer: COMMERCIAL

## 2025-02-13 NOTE — PROGRESS NOTES
Specialty Pharmacy Patient Management Program  Refill Outreach     Dianna was contacted today regarding refills of their medication(s).    Refill Questions      Flowsheet Row Most Recent Value   Changes to allergies? No   Changes to medications? No   New conditions or infections since last clinic visit No   Unplanned office visit, urgent care, ED, or hospital admission in the last 4 weeks  No   How does patient/caregiver feel medication is working? Very good   Financial problems or insurance changes  No   Since the previous refill, were any specialty medication doses or scheduled injections missed or delayed?  No   Does this patient require a clinical escalation to a pharmacist? No            Delivery Questions      Flowsheet Row Most Recent Value   Delivery method UPS   Delivery address verified with patient/caregiver? Yes   Delivery address Home   Number of medications in delivery 1   Medication(s) being filled and delivered Rimegepant Sulfate (Nurtec)   Doses left of specialty medications 6   Copay verified? Yes   Copay amount $0   Copay form of payment No copayment ($0)   Ship Date 2/17   Delivery Date Selection 02/18/25   Signature Required No                 Follow-up: 21 day(s)     Odilia Mora, Pharmacy Technician  2/13/2025  13:15 EST

## 2025-03-06 RX ORDER — TRAZODONE HYDROCHLORIDE 100 MG/1
TABLET ORAL
Qty: 135 TABLET | Refills: 0 | Status: SHIPPED | OUTPATIENT
Start: 2025-03-06

## 2025-03-28 ENCOUNTER — TELEMEDICINE (OUTPATIENT)
Dept: NEUROLOGY | Facility: CLINIC | Age: 45
End: 2025-03-28
Payer: COMMERCIAL

## 2025-03-28 DIAGNOSIS — G43.709 CHRONIC MIGRAINE WITHOUT AURA WITHOUT STATUS MIGRAINOSUS, NOT INTRACTABLE: Primary | ICD-10-CM

## 2025-03-28 PROCEDURE — 99213 OFFICE O/P EST LOW 20 MIN: CPT | Performed by: PSYCHIATRY & NEUROLOGY

## 2025-03-28 NOTE — ASSESSMENT & PLAN NOTE
44 year old right handed woman who is being evaluated via Telehealth with audio and video for follow up evaluation and treatment of chronic migraines which has responded well to combination of Botox for prevention and Nurtec ODT for acute treatment.  She reports her migraines starting about 15+ years ago.  Her migraines are located in the back of her head and also front of her head with throbbing quality which she rates as 8-10/10 on pain scale 1-10 with associated light and sound sensitivity and nausea and vomiting at times.  Headaches typically last a couple days and she feels like she has had more headaches over the past 6-7+ months.  She has tried topiramate 200mg daily which initially helped but had not helped more recently and due to having history of kidney stones this was discontinued.  She was previously on Prozac which has been discontinued and she continues to take bupropion along with Vraylar currently.  She has tried Imitrex and Fioricet which she did not think was helpful.  Maxalt seemed to be helping better but not helping any longer.  She denies family history of migraines.  She had a brain MRI scan done on 4/14/2021 which looks good with some nonspecific white matter changes which can be seen with migraines.  I started her on Emgality and she  had 5 doses and did not noticed any improvement in her migraines.  She continued to get 8-10 migraine days per month and up to prior 15+ migraine days per month in the past which responded well to Botox injections which since restarting this treatment it has reduced down to 3 migraines per month with her most recent prior Botox injection which is more than 80% drop in frequency of her migraines.  She returns today and reports the combination of Botox for prevention and Nurtec ODT for acute treatment is very helpful and she is happy with this combination.  I will continue current combination of Botox for prevention and Nurtec ODT for acute treatment.  Discussed  migraine triggers and lifestyle modifications.

## 2025-03-28 NOTE — PROGRESS NOTES
Chief Complaint  Migraine (About 2-3 a month)    Subjective          Dianna Clay presents to [facility] for   HISTORY OF PRESENT ILLNESS:    Patient is being evaluated via Telehealth utilizing both Video and Audio.      Dianna Clay is a 44 year old right handed woman who is being evaluated via Telehealth with audio and video for follow up evaluation and treatment of chronic migraines which has responded well to combination of Botox for prevention and Nurtec ODT for acute treatment.  She reports her migraines starting about 15+ years ago.  Her migraines are located in the back of her head and also front of her head with throbbing quality which she rates as 8-10/10 on pain scale 1-10 with associated light and sound sensitivity and nausea and vomiting at times.  Headaches typically last a couple days and she feels like she has had more headaches over the past 6-7+ months.  She has tried topiramate 200mg daily which initially helped but had not helped more recently and due to having history of kidney stones this was discontinued.  She was previously on Prozac which has been discontinued and she continues to take bupropion along with Vraylar currently.  She has tried Imitrex and Fioricet which she did not think was helpful.  Maxalt seemed to be helping better but not helping any longer.  She denies family history of migraines.  She had a brain MRI scan done on 4/14/2021 which looks good with some nonspecific white matter changes which can be seen with migraines.  I started her on Emgality and she  had 5 doses and did not noticed any improvement in her migraines.  She continued to get 8-10 migraine days per month and up to prior 15+ migraine days per month in the past which responded well to Botox injections which since restarting this treatment it has reduced down to 3 migraines per month with her most recent prior Botox injection which is more than 80% drop in frequency of her  migraines.  She returns today and reports the combination of Botox for prevention and Nurtec ODT for acute treatment is very helpful and she is happy with this combination.       Past Medical History:   Diagnosis Date    Allergic 22 years ago    Seasonal    Anxiety     Arthritis     Arthritis of neck     Depression     Headache     Headache, tension-type 6 months ago    Irritable bowel syndrome 20 years ago    Knee swelling 2016    Migraine 12 years ago    Urinary tract infection 28 years ago        Family History   Problem Relation Age of Onset    Thyroid disease Mother     Colon polyps Father     Depression Father     Anxiety disorder Father     Alcohol abuse Father     Alcohol abuse Brother     Thyroid disease Maternal Grandmother     Heart failure Maternal Grandmother     No Known Problems Maternal Grandfather     Anxiety disorder Paternal Grandmother     Depression Paternal Grandmother     Alcohol abuse Paternal Grandmother     Dementia Paternal Grandmother     Anxiety disorder Paternal Grandfather     Depression Paternal Grandfather     Alcohol abuse Paternal Grandfather     Anxiety disorder Daughter     Depression Daughter     Bipolar disorder Daughter     Bipolar disorder Son     ADD / ADHD Son     Thyroid disease Maternal Aunt     Asthma Maternal Aunt     Alcohol abuse Paternal Uncle     Colon cancer Neg Hx     Crohn's disease Neg Hx     Ulcerative colitis Neg Hx     Irritable bowel syndrome Neg Hx         Social History     Socioeconomic History    Marital status: Single    Number of children: 2   Tobacco Use    Smoking status: Former     Current packs/day: 0.00     Average packs/day: 0.8 packs/day for 13.0 years (9.7 ttl pk-yrs)     Types: Cigarettes     Start date: 1998     Quit date: 2011     Years since quittin.9     Passive exposure: Never    Smokeless tobacco: Never    Tobacco comments:     Off and on, was not consecutive time smoker   Vaping Use    Vaping status: Never Used    Substance and Sexual Activity    Alcohol use: Yes     Alcohol/week: 2.0 standard drinks of alcohol     Types: 2 Glasses of wine per week     Comment: Do not drink much    Drug use: Not Currently     Comment: cocaine in late teens, she has done some cocaine 3 times in the last 6 wks    Sexual activity: Yes     Partners: Male     Birth control/protection: I.U.D.        I have personally reviewed the ROS as stated below.     Review of Systems   Neurological:  Positive for headache. Negative for dizziness, tremors, seizures, syncope, facial asymmetry, speech difficulty, weakness, light-headedness, numbness, memory problem and confusion.   Psychiatric/Behavioral:  Negative for agitation, behavioral problems, decreased concentration, dysphoric mood, hallucinations, self-injury, sleep disturbance, suicidal ideas, negative for hyperactivity, depressed mood and stress. The patient is not nervous/anxious.         Objective   Vital Signs Not obtained as this is a Telehealth Video Visit    PHYSICAL EXAM:    General   Mental Status - Alert. General Appearance - Well developed, Well groomed, Oriented and Cooperative. Orientation - Oriented X3.       Head and Neck  Head - normocephalic, atraumatic with no lesions or palpable masses.  Neck    Global Assessment - supple.       Eye   Sclera/Conjunctiva - Bilateral - Normal.    ENMT  Mouth and Throat   Oral Cavity/Oropharynx: Oropharynx - the soft palate,uvula and tongue are normal in appearance.    Neurologic   Mental Status: Speech - Normal. Cognitive function - appropriate fund of knowledge. No impairment of attention, Impairment of concentration, impairment of long term memory or impairment of short term memory.  Cranial Nerves:   II Optic: Visual acuity - Left - Normal. Right - Normal.   VII Facial: - Normal Bilaterally.  VIII Acoustic - Bilateral - Hearing normal and (Hearing tested by finger rub).   IX Glossopharyngeal / X Vagus - Normal.  XI Accessory: Trapezius - Bilateral  - Normal. Sternocleidomastoid - Bilateral - Normal.  XII Hypoglossal - Bilateral - Normal.  Eye Movements: - Normal Bilaterally.  Sensory:   Light Touch: Intact - Globally.  Motor:   Bulk and Contour: - Normal.  Tone: - Normal.  Tremor: Not present.  Strength: 5/5 normal muscle strength - All Muscles.   General Assessment: - Coordination - No Impairment of finger-to-nose or Impairment of rapid alternating movements. Gait - Normal.     Result Review :                 Assessment and Plan    Problem List Items Addressed This Visit       Chronic migraine without aura without status migrainosus, not intractable - Primary    Current Assessment & Plan   44 year old right handed woman who is being evaluated via Telehealth with audio and video for follow up evaluation and treatment of chronic migraines which has responded well to combination of Botox for prevention and Nurtec ODT for acute treatment.  She reports her migraines starting about 15+ years ago.  Her migraines are located in the back of her head and also front of her head with throbbing quality which she rates as 8-10/10 on pain scale 1-10 with associated light and sound sensitivity and nausea and vomiting at times.  Headaches typically last a couple days and she feels like she has had more headaches over the past 6-7+ months.  She has tried topiramate 200mg daily which initially helped but had not helped more recently and due to having history of kidney stones this was discontinued.  She was previously on Prozac which has been discontinued and she continues to take bupropion along with Vraylar currently.  She has tried Imitrex and Fioricet which she did not think was helpful.  Maxalt seemed to be helping better but not helping any longer.  She denies family history of migraines.  She had a brain MRI scan done on 4/14/2021 which looks good with some nonspecific white matter changes which can be seen with migraines.  I started her on Emgality and she  had 5 doses  and did not noticed any improvement in her migraines.  She continued to get 8-10 migraine days per month and up to prior 15+ migraine days per month in the past which responded well to Botox injections which since restarting this treatment it has reduced down to 3 migraines per month with her most recent prior Botox injection which is more than 80% drop in frequency of her migraines.  She returns today and reports the combination of Botox for prevention and Nurtec ODT for acute treatment is very helpful and she is happy with this combination.  I will continue current combination of Botox for prevention and Nurtec ODT for acute treatment.  Discussed migraine triggers and lifestyle modifications.         Relevant Medications    rimegepant sulfate ODT (Nurtec) 75 MG disintegrating tablet       Patient provided consent for Telehealth visit.      This was an audio and video enabled telemedicine encounter.     I performed this clinical encounter via real-time telehealth video connection originating from the patient's location at home and my location at [facility]. Verbal consent to participate in this telehealth video clinical visit was obtained and any questions by the patient regarding the interaction were answered.     Follow Up   No follow-ups on file.  Patient was given instructions and counseling regarding her condition or for health maintenance advice. Please see specific information pulled into the AVS if appropriate.

## 2025-04-02 ENCOUNTER — SPECIALTY PHARMACY (OUTPATIENT)
Dept: NEUROLOGY | Facility: CLINIC | Age: 45
End: 2025-04-02
Payer: COMMERCIAL

## 2025-04-02 NOTE — PROGRESS NOTES
Specialty Pharmacy Patient Management Program  Refill Outreach     [facility] QOD approved until 4.2.2026 key [id number]      Corrine Veloz  4/2/2025  10:14 EDT

## 2025-04-02 NOTE — PROGRESS NOTES
Specialty Pharmacy Patient Management Program  Refill Outreach     Nurtec PA submitted 4.2.25 key [id number]     Corrine Veloz  4/2/2025  10:10 EDT

## 2025-04-03 ENCOUNTER — SPECIALTY PHARMACY (OUTPATIENT)
Age: 45
End: 2025-04-03
Payer: COMMERCIAL

## 2025-04-03 NOTE — PROGRESS NOTES
Specialty Pharmacy Patient Management Program  Refill Outreach     Dianna was contacted today regarding refills of their medication(s).    Refill Questions      Flowsheet Row Most Recent Value   Changes to allergies? No   Changes to medications? No   New conditions or infections since last clinic visit No   Unplanned office visit, urgent care, ED, or hospital admission in the last 4 weeks  No   How does patient/caregiver feel medication is working? Very good   Financial problems or insurance changes  No   Since the previous refill, were any specialty medication doses or scheduled injections missed or delayed?  No   Does this patient require a clinical escalation to a pharmacist? No            Delivery Questions      Flowsheet Row Most Recent Value   Delivery method UPS   Delivery address verified with patient/caregiver? Yes   Delivery address Home   Number of medications in delivery 1   Medication(s) being filled and delivered Rimegepant Sulfate (NURTEC-ODT)   Doses left of specialty medications A couple doses left   Copay verified? Yes   Copay amount $0.00   Copay form of payment No copayment ($0)   Delivery Date Selection 04/04/25   Signature Required No                 Follow-up: 30 day(s)     Amber Agrawal, Pharmacy Technician  4/3/2025  09:24 EDT

## 2025-04-04 ENCOUNTER — TELEPHONE (OUTPATIENT)
Dept: ORTHOPEDIC SURGERY | Facility: CLINIC | Age: 45
End: 2025-04-04
Payer: COMMERCIAL

## 2025-04-04 ENCOUNTER — OFFICE VISIT (OUTPATIENT)
Dept: ORTHOPEDIC SURGERY | Facility: CLINIC | Age: 45
End: 2025-04-04
Payer: COMMERCIAL

## 2025-04-04 VITALS — BODY MASS INDEX: 31.65 KG/M2 | HEIGHT: 65 IN | WEIGHT: 190 LBS

## 2025-04-04 DIAGNOSIS — M17.12 PRIMARY OSTEOARTHRITIS OF LEFT KNEE: ICD-10-CM

## 2025-04-04 DIAGNOSIS — M17.11 PRIMARY OSTEOARTHRITIS OF RIGHT KNEE: Primary | ICD-10-CM

## 2025-04-04 RX ORDER — LIDOCAINE HYDROCHLORIDE 10 MG/ML
4 INJECTION, SOLUTION EPIDURAL; INFILTRATION; INTRACAUDAL; PERINEURAL
Status: COMPLETED | OUTPATIENT
Start: 2025-04-04 | End: 2025-04-04

## 2025-04-04 RX ORDER — TRIAMCINOLONE ACETONIDE 40 MG/ML
80 INJECTION, SUSPENSION INTRA-ARTICULAR; INTRAMUSCULAR
Status: COMPLETED | OUTPATIENT
Start: 2025-04-04 | End: 2025-04-04

## 2025-04-04 RX ADMIN — TRIAMCINOLONE ACETONIDE 80 MG: 40 INJECTION, SUSPENSION INTRA-ARTICULAR; INTRAMUSCULAR at 13:52

## 2025-04-04 RX ADMIN — LIDOCAINE HYDROCHLORIDE 4 ML: 10 INJECTION, SOLUTION EPIDURAL; INFILTRATION; INTRACAUDAL; PERINEURAL at 13:52

## 2025-04-04 NOTE — PROGRESS NOTES
Encounter Diagnoses   Name Primary?   • Primary osteoarthritis of right knee Yes   • Primary osteoarthritis of left knee        Patient presents to clinic today for bilateral knee injection(s). I explained details of injections as well as risks, benefits and alternatives with the patient today, had all questions answered, wished to proceed with injection.  I will see patient back as needed for follow up on injections. Patient was instructed to watch for signs or symptoms of infection including redness, swelling, warmth to the touch, or significant increased pain and to contact our office immediately if any of these issues were noted.      Follow up: as needed       - Large Joint Arthrocentesis: bilateral knee on 4/4/2025 1:52 PM  Indications: pain  Details: 22 G needle, anterolateral approach  Medications (Right): 80 mg triamcinolone acetonide 40 MG/ML; 4 mL lidocaine PF 1% 1 %  Medications (Left): 80 mg triamcinolone acetonide 40 MG/ML; 4 mL lidocaine PF 1% 1 %  Outcome: tolerated well, no immediate complications  Procedure, treatment alternatives, risks and benefits explained, specific risks discussed. Consent was given by the patient. Immediately prior to procedure a time out was called to verify the correct patient, procedure, equipment, support staff and site/side marked as required. Patient was prepped and draped in the usual sterile fashion.

## 2025-04-04 NOTE — TELEPHONE ENCOUNTER
CALLED PATIENT TO OFFER A SOONER APPOINTMENT FOR TODAY, IF PATIENT CALLS BACK, TRANSFER HER TO THE OFFICE

## 2025-04-04 NOTE — PROGRESS NOTES
Subjective   Dianna Clay is a 42 y.o. female. cough    History of Present Illness    The patient is here today with c/o cough. She started on Saturday with scratchy throat. Monday started feeling worse. Taking mucinex, zinc, Vit C and ibuprofen.       Seeing GYN- for abnormal periods, she has recently been started on estrogen, to f/u in a few weeks.   The following portions of the patient's history were reviewed and updated as appropriate: allergies, current medications, past family history, past medical history, past social history, past surgical history and problem list.    Review of Systems   Constitutional: Positive for fatigue (a little). Negative for chills and fever.   HENT: Positive for ear pain, postnasal drip, rhinorrhea, sinus pressure, sore throat (scratchy) and voice change.    Respiratory: Positive for cough. Negative for chest tightness, shortness of breath and wheezing.    Cardiovascular: Negative.    Musculoskeletal: Negative for myalgias.   Neurological: Positive for headache (occ).       Objective   Physical Exam  Constitutional:       Appearance: Normal appearance. She is well-developed.   HENT:      Right Ear: Hearing, tympanic membrane, ear canal and external ear normal.      Left Ear: Hearing, tympanic membrane, ear canal and external ear normal.      Nose:      Right Sinus: No maxillary sinus tenderness or frontal sinus tenderness.      Left Sinus: Maxillary sinus tenderness and frontal sinus tenderness present.      Mouth/Throat:      Lips: Pink.      Mouth: Mucous membranes are moist.      Pharynx: Oropharynx is clear. Uvula midline.      Tonsils: No tonsillar exudate.   Neck:      Thyroid: No thyromegaly.   Cardiovascular:      Rate and Rhythm: Normal rate and regular rhythm.      Heart sounds: Normal heart sounds.   Pulmonary:      Effort: Pulmonary effort is normal.      Breath sounds: Normal breath sounds.   Musculoskeletal:      Cervical back: Normal range of motion and neck  supple.   Lymphadenopathy:      Cervical: No cervical adenopathy.   Skin:     General: Skin is warm and dry.   Neurological:      Mental Status: She is alert.   Psychiatric:         Behavior: Behavior normal.         Thought Content: Thought content normal.         Judgment: Judgment normal.         Vitals:    11/23/22 1542   BP: 98/60   Pulse: 60   Temp: 98 °F (36.7 °C)   SpO2: 98%     Body mass index is 31 kg/m².      Current Outpatient Medications:   •  cetirizine (zyrTEC) 10 MG tablet, Take 10 mg by mouth Daily., Disp: , Rfl:   •  estradiol (ESTRACE) 1 MG tablet, Take 1 mg by mouth Daily., Disp: , Rfl:   •  fluticasone (FLONASE) 50 MCG/ACT nasal spray, 2 sprays into the nostril(s) as directed by provider Daily., Disp: 9.9 mL, Rfl: 11  •  levonorgestrel (MIRENA) 20 MCG/24HR IUD, 1 each by Intrauterine route., Disp: , Rfl:   •  minocycline (MINOCIN,DYNACIN) 100 MG capsule, Take 1 capsule by mouth once daily, Disp: 60 capsule, Rfl: 4  •  ondansetron (Zofran) 4 MG tablet, Take 1 tablet by mouth Every 6 (Six) Hours As Needed for Nausea or Vomiting., Disp: 15 tablet, Rfl: 0  •  pantoprazole (PROTONIX) 40 MG EC tablet, Take 1 tablet by mouth once daily, Disp: 90 tablet, Rfl: 0  •  rizatriptan (Maxalt) 10 MG tablet, Take 1 tablet by mouth 1 (One) Time As Needed for Migraine. May repeat in 2 hours if needed, Disp: 12 tablet, Rfl: 2  •  traZODone (DESYREL) 100 MG tablet, TAKE 1 TABLET BY MOUTH AT BEDTIME, Disp: 90 tablet, Rfl: 0  •  methylPREDNISolone (MEDROL) 4 MG dose pack, Take as directed on package instructions., Disp: 21 each, Rfl: 0  Assessment & Plan   Diagnoses and all orders for this visit:    1. Acute non-recurrent maxillary sinusitis (Primary)  -     methylPREDNISolone (MEDROL) 4 MG dose pack; Take as directed on package instructions.  Dispense: 21 each; Refill: 0                 1. Sinusitis- flu and COVID swab negative, medrol dose pack, continue mucinex, hydrate, notify for s/s of infection   see md note

## 2025-04-09 ENCOUNTER — PROCEDURE VISIT (OUTPATIENT)
Dept: NEUROLOGY | Facility: CLINIC | Age: 45
End: 2025-04-09
Payer: COMMERCIAL

## 2025-04-09 DIAGNOSIS — G43.719 INTRACTABLE CHRONIC MIGRAINE WITHOUT AURA AND WITHOUT STATUS MIGRAINOSUS: Primary | ICD-10-CM

## 2025-04-09 NOTE — PROGRESS NOTES
A time out was performed to confirm I had the right patient and was completing the right procedure.       Botox injection: Botox injection for neuromuscular block.  Indication: Chronic migraines.  Risks, benefits and alternatives were discussed with the patient.  EMG guidance was not used in identifying the injection site.  Procerus: 5 units was injected.  : 5 units was injected on the right and 5 units injected on the left.  Frontalis: 10 units injected on the right and 10 units injected on the left.  Temporalis: 20 units injected on the right and 20 units injected on the left.  Occipitalis: 15 units injected on the right and 15 units injected on the left.  Cervical paraspinal: 10 units injected on the right and 10 units injected on the left.  Trapezius: 15 units injected on the right and 15 units injected on the left.  200 unit vial, 1 vials, 45 wasted and 155 used.  The patient tolerated the procedure well.  There were no complications.  Patient instructions: The patient was instructed that they may experience localized discomfort over the next 12 to 24 hours and may take over the counter pain medication if needed.  Follow-up in the office in 3 months for next Botox injection.      Botox provided by: office provided shon and bill

## 2025-04-14 LAB
ALBUMIN SERPL-MCNC: 3.8 G/DL (ref 3.5–5.2)
ALBUMIN/GLOB SERPL: 1.7 G/DL
ALP SERPL-CCNC: 56 U/L (ref 39–117)
ALT SERPL-CCNC: 23 U/L (ref 1–33)
AST SERPL-CCNC: 20 U/L (ref 1–32)
BASOPHILS # BLD AUTO: 0.04 10*3/MM3 (ref 0–0.2)
BASOPHILS NFR BLD AUTO: 0.5 % (ref 0–1.5)
BILIRUB SERPL-MCNC: 0.3 MG/DL (ref 0–1.2)
BUN SERPL-MCNC: 16 MG/DL (ref 6–20)
BUN/CREAT SERPL: 17 (ref 7–25)
CALCIUM SERPL-MCNC: 8.8 MG/DL (ref 8.6–10.5)
CHLORIDE SERPL-SCNC: 103 MMOL/L (ref 98–107)
CHOLEST SERPL-MCNC: 191 MG/DL (ref 0–200)
CHOLEST/HDLC SERPL: 2.69 {RATIO}
CO2 SERPL-SCNC: 25.2 MMOL/L (ref 22–29)
CREAT SERPL-MCNC: 0.94 MG/DL (ref 0.57–1)
EGFRCR SERPLBLD CKD-EPI 2021: 77.4 ML/MIN/1.73
EOSINOPHIL # BLD AUTO: 0.28 10*3/MM3 (ref 0–0.4)
EOSINOPHIL NFR BLD AUTO: 3.7 % (ref 0.3–6.2)
ERYTHROCYTE [DISTWIDTH] IN BLOOD BY AUTOMATED COUNT: 11.8 % (ref 12.3–15.4)
GLOBULIN SER CALC-MCNC: 2.3 GM/DL
GLUCOSE SERPL-MCNC: 89 MG/DL (ref 65–99)
HCT VFR BLD AUTO: 42.1 % (ref 34–46.6)
HDLC SERPL-MCNC: 71 MG/DL (ref 40–60)
HGB BLD-MCNC: 13.8 G/DL (ref 12–15.9)
IMM GRANULOCYTES # BLD AUTO: 0.02 10*3/MM3 (ref 0–0.05)
IMM GRANULOCYTES NFR BLD AUTO: 0.3 % (ref 0–0.5)
LDLC SERPL CALC-MCNC: 104 MG/DL (ref 0–100)
LYMPHOCYTES # BLD AUTO: 2.95 10*3/MM3 (ref 0.7–3.1)
LYMPHOCYTES NFR BLD AUTO: 39.1 % (ref 19.6–45.3)
MCH RBC QN AUTO: 31.4 PG (ref 26.6–33)
MCHC RBC AUTO-ENTMCNC: 32.8 G/DL (ref 31.5–35.7)
MCV RBC AUTO: 95.7 FL (ref 79–97)
MONOCYTES # BLD AUTO: 0.57 10*3/MM3 (ref 0.1–0.9)
MONOCYTES NFR BLD AUTO: 7.5 % (ref 5–12)
NEUTROPHILS # BLD AUTO: 3.69 10*3/MM3 (ref 1.7–7)
NEUTROPHILS NFR BLD AUTO: 48.9 % (ref 42.7–76)
NRBC BLD AUTO-RTO: 0 /100 WBC (ref 0–0.2)
PLATELET # BLD AUTO: 241 10*3/MM3 (ref 140–450)
POTASSIUM SERPL-SCNC: 4 MMOL/L (ref 3.5–5.2)
PROT SERPL-MCNC: 6.1 G/DL (ref 6–8.5)
RBC # BLD AUTO: 4.4 10*6/MM3 (ref 3.77–5.28)
SODIUM SERPL-SCNC: 139 MMOL/L (ref 136–145)
TRIGL SERPL-MCNC: 88 MG/DL (ref 0–150)
TSH SERPL DL<=0.005 MIU/L-ACNC: 1.75 UIU/ML (ref 0.27–4.2)
VLDLC SERPL CALC-MCNC: 16 MG/DL (ref 5–40)
WBC # BLD AUTO: 7.55 10*3/MM3 (ref 3.4–10.8)

## 2025-04-23 RX ORDER — BUPROPION HYDROCHLORIDE 300 MG/1
300 TABLET ORAL DAILY
Qty: 90 TABLET | Refills: 0 | Status: SHIPPED | OUTPATIENT
Start: 2025-04-23

## 2025-05-01 RX ORDER — CARIPRAZINE 3 MG/1
3 CAPSULE, GELATIN COATED ORAL DAILY
Qty: 90 CAPSULE | Refills: 0 | Status: SHIPPED | OUTPATIENT
Start: 2025-05-01

## 2025-05-02 ENCOUNTER — SPECIALTY PHARMACY (OUTPATIENT)
Dept: INFUSION THERAPY | Facility: HOSPITAL | Age: 45
End: 2025-05-02
Payer: COMMERCIAL

## 2025-05-02 NOTE — PROGRESS NOTES
Specialty Pharmacy Patient Management Program  Refill Outreach     Dianna was contacted today regarding refills of their medication(s).    Refill Questions      Flowsheet Row Most Recent Value   Changes to allergies? No   Changes to medications? No   New conditions or infections since last clinic visit No   Unplanned office visit, urgent care, ED, or hospital admission in the last 4 weeks  No   How does patient/caregiver feel medication is working? Very good   Financial problems or insurance changes  No   Since the previous refill, were any specialty medication doses or scheduled injections missed or delayed?  No   Does this patient require a clinical escalation to a pharmacist? No            Delivery Questions      Flowsheet Row Most Recent Value   Delivery method UPS   Delivery address verified with patient/caregiver? Yes   Delivery address Home   Other address preferred n/a   Number of medications in delivery 1   Medication(s) being filled and delivered Rimegepant Sulfate (NURTEC-ODT)   Doses left of specialty medications 6   Copay verified? Yes   Copay amount 0   Copay form of payment No copayment ($0)   Delivery Date Selection 05/06/25   Signature Required No   Do you consent to receive electronic handouts?  Yes                 Follow-up: 25 day(s)     Vivienne Vazquez, Pharmacy Technician  5/2/2025  11:14 EDT

## 2025-05-20 ENCOUNTER — TELEPHONE (OUTPATIENT)
Dept: NEUROLOGY | Facility: CLINIC | Age: 45
End: 2025-05-20

## 2025-05-26 DIAGNOSIS — K21.9 GASTROESOPHAGEAL REFLUX DISEASE WITHOUT ESOPHAGITIS: ICD-10-CM

## 2025-05-27 RX ORDER — MELOXICAM 15 MG/1
15 TABLET ORAL DAILY
Qty: 30 TABLET | Refills: 0 | Status: SHIPPED | OUTPATIENT
Start: 2025-05-27

## 2025-05-27 RX ORDER — PANTOPRAZOLE SODIUM 40 MG/1
40 TABLET, DELAYED RELEASE ORAL DAILY
Qty: 90 TABLET | Refills: 0 | Status: SHIPPED | OUTPATIENT
Start: 2025-05-27

## 2025-05-27 NOTE — TELEPHONE ENCOUNTER
Rx Refill Note  Requested Prescriptions     Pending Prescriptions Disp Refills    meloxicam (MOBIC) 15 MG tablet [Pharmacy Med Name: Meloxicam 15 MG Oral Tablet] 30 tablet 0     Sig: Take 1 tablet by mouth once daily      Last office visit with prescribing clinician: 4/4/2025      Next office visit with prescribing clinician: 7/8/2025   Last Filled: 1/3/2025    Primary osteoarthritis of right knee  Primary osteoarthritis of left knee      Selena Fritz MA  05/27/25, 08:21 EDT    Previous RX pended for your approval, change or denial.     {TIP  Encounters:    {TIP  Please add Last Relevant Lab Date if appropriate:  {TIP  Is Refill Pharmacy correct?:

## 2025-06-02 RX ORDER — TRAZODONE HYDROCHLORIDE 100 MG/1
TABLET ORAL
Qty: 135 TABLET | Refills: 0 | Status: SHIPPED | OUTPATIENT
Start: 2025-06-02

## 2025-06-11 ENCOUNTER — SPECIALTY PHARMACY (OUTPATIENT)
Dept: NEUROLOGY | Facility: CLINIC | Age: 45
End: 2025-06-11
Payer: COMMERCIAL

## 2025-06-11 NOTE — PROGRESS NOTES
Specialty Pharmacy Patient Management Program  Neurology Reassessment     Dianna Clay is a 45 y.o. female with migraines seen by a Neurology provider and enrolled in the Neurology Patient Management program offered by [facility].  A follow-up outreach was conducted, including assessment of continued therapy appropriateness, medication adherence, and side effect incidence and management for rimegepant (Nurtec).     Changes to Insurance Coverage or Financial Support  None       Relevant Past Medical History and Comorbidities  Relevant medical history and concomitant health conditions were discussed with the patient. The patient's chart has been reviewed for relevant past medical history and comorbid health conditions and updated as necessary.   Past Medical History:   Diagnosis Date    Allergic 22 years ago    Seasonal    Anxiety     Arthritis     Arthritis of neck     Depression     Headache     Headache, tension-type 6 months ago    Irritable bowel syndrome 20 years ago    Knee swelling 2016    Migraine 12 years ago    Urinary tract infection 28 years ago     Social History     Socioeconomic History    Marital status: Single    Number of children: 2   Tobacco Use    Smoking status: Former     Current packs/day: 0.00     Average packs/day: 0.8 packs/day for 13.0 years (9.7 ttl pk-yrs)     Types: Cigarettes     Start date: 1998     Quit date: 2011     Years since quittin.1     Passive exposure: Never    Smokeless tobacco: Never    Tobacco comments:     Off and on, was not consecutive time smoker   Vaping Use    Vaping status: Never Used   Substance and Sexual Activity    Alcohol use: Yes     Alcohol/week: 2.0 standard drinks of alcohol     Types: 2 Glasses of wine per week     Comment: Do not drink much    Drug use: Not Currently     Comment: cocaine in late teens, she has done some cocaine 3 times in the last 6 wks    Sexual activity: Yes     Partners: Male      Birth control/protection: I.U.D.     Problem list reviewed by Myrtle Yoo RPH on 6/11/2025 at 12:58 PM      Hospitalizations and Urgent Care Since Last Assessment  ED Visits, Admissions, or Hospitalizations: none   Urgent Office Visits: none       Allergies  Known allergies and reactions were discussed with the patient. The patient's chart has been reviewed for allergy information and updated as necessary.   Allergies   Allergen Reactions    Meperidine      Allergies reviewed by Myrtle Yoo RPH on 6/11/2025 at 12:57 PM      Relevant Laboratory Values  Common labs          10/10/2024    11:30 4/14/2025    07:37   Common Labs   Glucose 94  89    BUN 17  16    Creatinine 0.80  0.94    Sodium 137  139    Potassium 4.2  4.0    Chloride 100  103    Calcium 9.6  8.8    Albumin 4.5  3.8    Total Bilirubin 0.4  0.3    Alkaline Phosphatase 55  56    AST (SGOT) 22  20    ALT (SGPT) 24  23    WBC  7.55    Hemoglobin  13.8    Hematocrit  42.1    Platelets  241    Total Cholesterol  191    Triglycerides  88    HDL Cholesterol  71    LDL Cholesterol   104        Lab Assessment  The above labs have been reviewed. No dose adjustments are needed for the specialty medication(s) based on the labs.       Current Medication List  This medication list has been reviewed with the patient and evaluated for any interactions or necessary modifications/recommendations, and updated to include all prescription medications, OTC medications, and supplements the patient is currently taking.  This list reflects what is contained in the patient's profile, which has also been marked as reviewed to communicate to other providers it is the most up to date version of the patient's current medication therapy.     Current Outpatient Medications:     amphetamine-dextroamphetamine (ADDERALL) 30 MG tablet, Take 1 tablet by mouth Daily., Disp: , Rfl:     buPROPion XL (WELLBUTRIN XL) 300 MG 24 hr tablet, Take 1 tablet by mouth once daily, Disp: 90  tablet, Rfl: 0    cetirizine (zyrTEC) 10 MG tablet, Take 1 tablet by mouth Daily., Disp: , Rfl:     fluticasone (FLONASE) 50 MCG/ACT nasal spray, 2 sprays into the nostril(s) as directed by provider Daily., Disp: 9.9 mL, Rfl: 11    Gabriele Fe 1/20 1-20 MG-MCG per tablet, TAKE 1 TABLET BY MOUTH ONCE DAILY. TAKE CONTINUOUSLY, Disp: , Rfl:     meloxicam (MOBIC) 15 MG tablet, Take 1 tablet by mouth once daily, Disp: 30 tablet, Rfl: 0    ondansetron (Zofran) 4 MG tablet, TAKE ONE TABLET DAILY, Disp: 30 tablet, Rfl: 0    pantoprazole (PROTONIX) 40 MG EC tablet, Take 1 tablet by mouth once daily, Disp: 90 tablet, Rfl: 0    rimegepant sulfate ODT (Nurtec) 75 MG disintegrating tablet, Place 1 tablet under the tongue Every Other Day., Disp: 16 tablet, Rfl: 2    rizatriptan (Maxalt) 10 MG tablet, Take 1 tablet by mouth 1 (One) Time As Needed for Migraine. May repeat in 2 hours if needed, Disp: 12 tablet, Rfl: 2    traZODone (DESYREL) 100 MG tablet, TAKE 1 & 1/2 (ONE & ONE-HALF) TABLETS BY MOUTH AT BEDTIME, Disp: 135 tablet, Rfl: 0    Vraylar 3 MG capsule capsule, Take 1 capsule by mouth once daily, Disp: 90 capsule, Rfl: 0    Medicines reviewed by Myrtle Yoo [facility] on 6/11/2025 at 12:58 PM    Drug Interactions  None of concern at this time        Adverse Drug Reactions  Medication tolerability: Tolerating with no to minimal ADRs  Medication plan: Continue therapy with normal follow-up  Plan for ADR Management: No ADRs reported during this patient encounter      Adherence, Self-Administration, and Current Therapy Problems  Adherence related patient's specialty therapy was discussed with the patient. The Adherence segment of this outreach has been reviewed and updated.   Adherence Questions  Linked Medication(s) Assessed: Rimegepant Sulfate (NURTEC-ODT)  On average, how many doses/injections does the patient miss per month?: 0  What are the identified reasons for non-adherence or missed doses? : no problems identified  What is  the estimated medication adherence level?: %  Based on the patient/caregiver response and refill history, does this patient require an MTP to track adherence improvements?: no    Additional Barriers to Patient Self-Administration: none  Methods for Supporting Patient Self-Administration: n/a    Recently Close Medication Therapy Problems  No medication therapy recommendations to display  Open Medication Therapy Problems  No medication therapy recommendations to display     Goals of Therapy  Goals related to the patient's specialty therapy was discussed with the patient. The Patient Goals segment of this outreach has been reviewed and updated.    Goals Addressed Today        Specialty Pharmacy General Goal      Reduce migraine frequency and severity. As of 12/17/24 patient rates migraines as a 8-10/10 on a pain scale from 1-10 with associated light and sound sensitivity and nausea and vomiting at times. As of 12/17/24, she reports about 8-10 migraine days per month and up to 15+ migraine days per month in the past. Botox had previously reduced her migraines down to maybe 1-2 migraines per month with her most recent prior Botox injection which is more than 80% drop in frequency of her migraines. Restarting Botox for prevention and starting rimegepant for either prevention or acute treatment use pending known unavoidable triggers and pending migraine frequency.     6/11/25: Patient currently prescribed rimegepant 75 mg ODT every other day. She reports on average 2-3 migraines monthly that are resolved quickly and less severe with use of rimegepant. No new side effects or concerns at this time.                Quality of Life Assessment   Quality of Life related to the patient's enrollment in the patient management program and services provided was discussed with the patient. The QOL segment of this outreach has been reviewed and updated.   Quality of Life Improvement Scale: 8-Moderately better      Reassessment  Plan & Follow-Up  Medication Therapy Changes: Continue rimegepant 75 mg ODT every other day  Related Plans, Therapy Recommendations, or Issues to Be Addressed: Nothing further to be addressed at this time.  Pharmacist to perform regular reassessments no more than (6) months from the previous assessment.  Care Coordinator to set up future refill outreaches, coordinate prescription delivery, and escalate clinical questions to pharmacist.     Attestation  Therapeutic appropriateness: Appropriate  I attest the patient was actively involved in and has agreed to the above plan of care. If the prescribed therapy is at any point deemed not appropriate based on the current or future assessments, a consultation will be initiated with the patient's specialty care provider to determine the best course of action. The revised plan of therapy will be documented along with any additional patient education provided. Discussed aforementioned material with patient by phone.    Myrtle Yoo RPH  6/11/2025  13:00 EDT

## 2025-06-11 NOTE — PROGRESS NOTES
Specialty Pharmacy Refill Coordination Note     Dianna is a 45 y.o. female contacted today regarding refills of her specialty medication(s).    Specialty medication(s) and dose(s) confirmed: Rimegepant 75 mg ODT every other day   Changes to medications: no  Changes to insurance: no  Reviewed and verified with patient:  Allergies  Meds  Problems         Refill Questions      Flowsheet Row Most Recent Value   Changes to allergies? No   Changes to medications? No   New conditions or infections since last clinic visit No   Unplanned office visit, urgent care, ED, or hospital admission in the last 4 weeks  No   How does patient/caregiver feel medication is working? Very good   Financial problems or insurance changes  No   Since the previous refill, were any specialty medication doses or scheduled injections missed or delayed?  No   Does this patient require a clinical escalation to a pharmacist? No            Delivery Questions      Flowsheet Row Most Recent Value   Delivery method [employer]   Delivery address verified with patient/caregiver? Yes   Delivery address Home   Number of medications in delivery 1   Medication(s) being filled and delivered Rimegepant Sulfate (NURTEC-ODT)   Doses left of specialty medications 2   Copay verified? Yes   Copay amount $0   Copay form of payment No copayment ($0)   Delivery Date Selection 06/12/25   Signature Required No                 Follow-up: 3 week(s)     Myrtle Yoo [facility]  6/11/2025   12:59 EDT

## 2025-06-30 RX ORDER — MELOXICAM 15 MG/1
15 TABLET ORAL DAILY
Qty: 30 TABLET | Refills: 0 | Status: SHIPPED | OUTPATIENT
Start: 2025-06-30

## 2025-06-30 NOTE — TELEPHONE ENCOUNTER
Rx Refill Note  Requested Prescriptions     Pending Prescriptions Disp Refills    meloxicam (MOBIC) 15 MG tablet [Pharmacy Med Name: Meloxicam 15 MG Oral Tablet] 30 tablet 0     Sig: Take 1 tablet by mouth once daily      Last office visit with prescribing clinician: 4/4/2025      Next office visit with prescribing clinician: Visit date not found   Last Filled: 5/27/2025    Primary osteoarthritis of right knee  Primary osteoarthritis of left knee      Selena Fritz MA  06/30/25, 10:00 EDT    Previous RX pended for your approval, change or denial.     {TIP  Encounters:    {TIP  Please add Last Relevant Lab Date if appropriate:  {TIP  Is Refill Pharmacy correct?:

## 2025-07-09 ENCOUNTER — PROCEDURE VISIT (OUTPATIENT)
Dept: NEUROLOGY | Facility: CLINIC | Age: 45
End: 2025-07-09
Payer: COMMERCIAL

## 2025-07-09 DIAGNOSIS — G43.719 INTRACTABLE CHRONIC MIGRAINE WITHOUT AURA AND WITHOUT STATUS MIGRAINOSUS: Primary | ICD-10-CM

## 2025-07-09 PROCEDURE — 64615 CHEMODENERV MUSC MIGRAINE: CPT | Performed by: PSYCHIATRY & NEUROLOGY

## 2025-07-09 NOTE — PROGRESS NOTES
A time out was performed to confirm I had the right patient and was completing the right procedure.       Botox injection: Botox injection for neuromuscular block.  Indication: Chronic migraines.  Risks, benefits and alternatives were discussed with the patient.  EMG guidance was not used in identifying the injection site.  Procerus: 5 units was injected.  : 5 units was injected on the right and 5 units injected on the left.  Frontalis: 10 units injected on the right and 10 units injected on the left.  Temporalis: 20 units injected on the right and 20 units injected on the left.  Occipitalis: 15 units injected on the right and 15 units injected on the left.  Cervical paraspinal: 10 units injected on the right and 10 units injected on the left.  Trapezius: 15 units injected on the right and 15 units injected on the left.  200 unit vial, 1 vials, 45 wasted and 155 used.  The patient tolerated the procedure well.  There were no complications.  Patient instructions: The patient was instructed that they may experience localized discomfort over the next 12 to 24 hours and may take over the counter pain medication if needed.  Follow-up in the office in 3 months for next Botox injection.      Botox provided by: Botelsy Orozco, Office Supplied

## 2025-07-22 RX ORDER — BUPROPION HYDROCHLORIDE 300 MG/1
300 TABLET ORAL DAILY
Qty: 90 TABLET | Refills: 0 | Status: SHIPPED | OUTPATIENT
Start: 2025-07-22

## 2025-07-24 ENCOUNTER — TELEPHONE (OUTPATIENT)
Dept: INTERNAL MEDICINE | Facility: CLINIC | Age: 45
End: 2025-07-24
Payer: COMMERCIAL

## 2025-07-24 NOTE — TELEPHONE ENCOUNTER
Caller: Liberty Clay    Relationship to patient: Self    Best call back number: [phone]     Patient is needing: PATIENT WOULD LIKE TO KNOW IF LIBERTY WOULD BE WILLING TO TAKE HER DAUGHTER SANDIP CLAY 03/15/2007 ON AS A NEW PATIENT SINCE SHE CURRENTLY SEES HER.     PLEASE CALL OR MESSAGE THROUGH [employer] TO DISCUSS.

## 2025-07-28 RX ORDER — CARIPRAZINE 3 MG/1
3 CAPSULE, GELATIN COATED ORAL DAILY
Qty: 90 CAPSULE | Refills: 0 | Status: SHIPPED | OUTPATIENT
Start: 2025-07-28

## 2025-08-20 ENCOUNTER — OFFICE VISIT (OUTPATIENT)
Dept: NEUROLOGY | Facility: CLINIC | Age: 45
End: 2025-08-20
Payer: COMMERCIAL

## 2025-08-20 ENCOUNTER — OFFICE VISIT (OUTPATIENT)
Dept: INTERNAL MEDICINE | Facility: CLINIC | Age: 45
End: 2025-08-20
Payer: COMMERCIAL

## 2025-08-20 VITALS
SYSTOLIC BLOOD PRESSURE: 106 MMHG | OXYGEN SATURATION: 98 % | BODY MASS INDEX: 29.99 KG/M2 | WEIGHT: 180 LBS | HEIGHT: 65 IN | DIASTOLIC BLOOD PRESSURE: 78 MMHG | HEART RATE: 77 BPM

## 2025-08-20 VITALS
HEART RATE: 80 BPM | WEIGHT: 180 LBS | DIASTOLIC BLOOD PRESSURE: 74 MMHG | OXYGEN SATURATION: 100 % | HEIGHT: 65 IN | SYSTOLIC BLOOD PRESSURE: 110 MMHG | BODY MASS INDEX: 29.99 KG/M2

## 2025-08-20 DIAGNOSIS — G89.29 CHRONIC BILATERAL LOW BACK PAIN WITHOUT SCIATICA: Primary | ICD-10-CM

## 2025-08-20 DIAGNOSIS — G43.709 CHRONIC MIGRAINE WITHOUT AURA WITHOUT STATUS MIGRAINOSUS, NOT INTRACTABLE: Primary | ICD-10-CM

## 2025-08-20 DIAGNOSIS — M54.50 CHRONIC BILATERAL LOW BACK PAIN WITHOUT SCIATICA: Primary | ICD-10-CM

## 2025-08-20 PROCEDURE — 99213 OFFICE O/P EST LOW 20 MIN: CPT | Performed by: PSYCHIATRY & NEUROLOGY

## 2025-08-20 PROCEDURE — 99213 OFFICE O/P EST LOW 20 MIN: CPT | Performed by: NURSE PRACTITIONER

## 2025-08-20 RX ORDER — LISDEXAMFETAMINE DIMESYLATE 60 MG/1
CAPSULE ORAL
COMMUNITY
Start: 2025-08-15

## 2025-08-20 RX ORDER — METHOCARBAMOL 500 MG/1
1000 TABLET, FILM COATED ORAL 4 TIMES DAILY PRN
Qty: 120 TABLET | Refills: 0 | Status: SHIPPED | OUTPATIENT
Start: 2025-08-20

## 2025-08-20 RX ORDER — METHYLPREDNISOLONE 4 MG/1
TABLET ORAL
Qty: 21 EACH | Refills: 0 | Status: SHIPPED | OUTPATIENT
Start: 2025-08-20

## 2025-08-27 RX ORDER — TRAZODONE HYDROCHLORIDE 100 MG/1
TABLET ORAL
Qty: 135 TABLET | Refills: 0 | Status: SHIPPED | OUTPATIENT
Start: 2025-08-27

## 2025-08-28 DIAGNOSIS — K21.9 GASTROESOPHAGEAL REFLUX DISEASE WITHOUT ESOPHAGITIS: ICD-10-CM

## 2025-08-28 RX ORDER — PANTOPRAZOLE SODIUM 40 MG/1
40 TABLET, DELAYED RELEASE ORAL DAILY
Qty: 90 TABLET | Refills: 0 | Status: SHIPPED | OUTPATIENT
Start: 2025-08-28